# Patient Record
Sex: MALE | Race: WHITE
[De-identification: names, ages, dates, MRNs, and addresses within clinical notes are randomized per-mention and may not be internally consistent; named-entity substitution may affect disease eponyms.]

---

## 2017-01-23 NOTE — PDGENHP
History and Physical





- Chief Complaint


Weakness and fatigue with shortness of breath





- History of Present Illness


Patient is an 81 y/o male, well known to [facility] (Dr. ANA MARIA Rushing), with 

history of CAD s/p CABG (3V, 2012), PCI (LIMA graft, 2014), HTN, HLP, aortic 

stenosis (last mean aortic gradient with [facility] was 32 mm Hg), atrial 

fibrillation ([id number] score of 4), CHF (last [place] Echo with EF at 50%), 

CRI, prostate cancer with mets to the bone, and numerous pulmonary emboli (

history of Factor V Leiden, on coumadin with supratherapeutic INR this evening)

, who presented initially to [facility] earlier today with 

complaints of weakness, fatigue, and dyspnea.  After five days in [place], 

continued symptoms of weakness and fatigue ultimately resulted in the patient 

being evaluated in the ER.  Difficulty with standing was being noted.  No 

complaints of chest pains or pressure.  No PND or orthopnea (difficult to tell 

with the degree of dyspnea that the patient has noted).  No fevers or chills.  

No complaints of lower extremity swelling.





After 4.5 hours in the ER, awaiting for an ICU bed, alarms went off, and the 

patient was noted to be both unresponsive and without pulses.  CPR was 

implemented, both epinephrine and atropine were dosed, and attempts at 

intubation were undertaken, and ultimately unsuccessful.  A second dose of 

epinephrine was dosed, and the patient had spontaneous gag and respirations 

noted.  Normal sinus rhythm was noted, as was sinus bradycardia, and cardiology 

was consulted to the ER.  The patient was then taken to the cardiac cath lab 

for a temporary transvenous pacer placement.  Echocardiogram was also performed 

with normal LV size, mild reduction in LV function (42%), moderate LVH, mild to 

moderate mitral regurg, mild aortic insufficiency, and moderate to severe 

aortic stenosis (mean gradient was 25 mm Hg with peak to 60 mm Hg and MERI 

estimated to be 1.3 cm^2).  





At present, the patient is resting comfortably with intermittent ventricular 

pacing noted.  Salvos of nausea with emesis were also noted when I was 

assessing the patient this evening.





History Information





- Allergies/Home Medication List


Allergies/Adverse Reactions: 








daptomycin Allergy (Verified 11/15/13 09:46)


 





Home Medications: 








Aspirin [Aspirin 81mg (*)] 81 mg PO DAILY 03/13/12 [Last Taken 02/26/14]


Tadalafil [Cialis] 5 mg PO DAILY PRN 03/13/12 [Last Taken Unknown]


Tamsulosin HCl [Flomax 0.4 MG (*)] 0.4 mg PO DAILY 03/13/12 [Last Taken 02/27/14

]


Temazepam 15 mg PO HS PRN 03/13/12 [Last Taken 02/26/14]


celeCOXIB [Celebrex (*)] 200 mg PO DAILY 03/13/12 [Last Taken 02/24/14]


Bicalutamide [Casodex] 50 mg PO DAILY 12/30/13 [Last Taken 02/24/14]


Levothyroxine [Synthroid 150 mcg (RX)] 150 mcg PO DAILY06 12/30/13 [Last Taken 

02/27/14]


Lupron 1 desmond Q90D 12/30/13 [Last Taken 02/20/14]


Warfarin Sodium 2.5 mg PO TUTH@16 12/30/13 [Last Taken 02/23/14]


Warfarin Sodium 5 mg PO SUMOWEFRSA@16 12/30/13 [Last Taken 02/24/14]


Rosuvastatin Calcium [Crestor 5mg] 5 mg PO DAILY 02/03/14 [Last Taken 02/27/14]





I have personally reviewed and updated: family history, medical history, social 

history, surgical history





- Past Medical History


atrial fibrillation, coronary artery disease, cancer, DVT, GI bleed, 

hypertension, hyperlipidemia, myocardial infarction, pulmonary embolism





- Surgical History


Reports: angioplasty, coronary bypass surgery, coronary stent





- Family History


Positive for: CAD





- Social History


Smoking Status: Never smoked


Alcohol Use: Occasionally


Drug Use: None





Review of Systems


ROS: 10pt was reviewed & negative except for what was stated in HPI & below


Constitutional: Reports: weakness


EENMT: Reports: no symptoms


Cardiac: Reports: no symptoms


Respiratory: Reports: shortness of breath


Gastrointestinal: Reports: vomitting, nausea


Genitourinary: Reports: no symptoms


Muscolosketal: Reports: no symptoms


Skin: Reports: no symptoms


Neurological: Reports: weakness


Hematologic/Lymphatic: Reports: easy bleeding


Immunologic/Allergy: Reports: no symptoms





Physical Exam














Temp Pulse Resp BP Pulse Ox


 


 37.0 C   88   29 H  103/63   98 


 


 01/23/17 21:00  01/23/17 21:00  01/23/17 21:00  01/23/17 21:00  01/23/17 21:00




















O2 (L/minute)                  6














Constitutional: not in pain, chronically ill appearing, uncomfortable


Cardiovascular: systolic murmur (consistent with history of AS), irregularly 

irregular, JVD, pulses symmetric bilaterally, bradycardia


Peripheral Pulses: 2+: femoral (R), femoral (L), dorsalis-pedis (R), dorsalis-

pedis (L)


Respiratory: no respiratory distress, reduced air movement, inspiratory crackles


Gastrointestinal: normoactive bowel sounds


Genitourinary: no bladder fullness


Skin: warm


Musculoskeletal: full muscle strength


Neurologic: AAOx3, weakness, CN II-XII Intact


Psychiatric: interacting appropriately, not anxious, not encephalopathic





Lab Data & Imaging Review


EKG Interpretation: Positive for: normal sinsus rhythm (with RBBB and 

ventricular trigeminy noted)





Assessment & Plan


Assessment: 





Patient is an 81 y/o male with history of CAD s/p CABG and PCI (to LIMA), with 

HTN, HLP, metastatic prostate cancer (to bone), renal insufficiency, PE and 

atrial fibrillation (on coumadin with supratherapeutic INR this evening), CHF 

with mild reduction in LVEF (likely ischaemic, but aortic valve pathology is 

also known), who presented to [facility] with weakness, fatigue

, and dyspnea from [facility] after asystolic event noted 4.5 hours after 

being in the ER.  CPR was implemented with epi and atropine, with return of 

spontaneous pulses and respiration.  Given the arrhythmias noted, a temp 

transvenous pacer was placed.  Echocardiogram with similar findings to that 

which was noted in the past - specifically LVEF and degree of aortic valve 

pathology. Uncertain if the inferolateral wall motion abnormality was "new" or 

"old".  Minor elevation in troponin was noted (0.15) given the CPR performed.  





At present, the patient is doing well.  While en route from [place], pressures 

were noted to be somewhat soft, and Dopamine was started (and continued).  

Intermittent salvos of pacing and sinus rhythm are being noted.


Plan: 


Patient should have angiogram given the circumstances noted and significant 

past history.  Of concern is the elevation in INR (>3) and the elevation to the 

creatinine (1.5).  At present, the patient is without active cardiovascular 

complaints.  Would maintain therapy on Dopamine for pressure support. Would 

resume outpatient therapies for HTN management with improvement in blood 

pressure (this is not noted at present).  Would hold coumadin therapy given the 

supratherapeutic INR.  NPO after midnight for possible radial angiogram in the 

morning.

## 2017-01-23 NOTE — CPEKG
Heart Rate: 78

RR Interval: 769

P-R Interval: 164

QRSD Interval: 162

QT Interval: 332

QTC Interval: 379

P Axis: -40

QRS Axis: -70

T Wave Axis: 128

EKG Severity - ABNORMAL ECG -

EKG Impression: SINUS TACHYCARDIA WITH FREQUENT PVCS AND OCCASIONAL VENTRICULAR PACING

EKG Impression: VENTRICULAR TRIGEMINY

EKG Impression: RIGHT BUNDLE BRANCH BLOCK

Electronically Signed By: Jacky Marc 25-Jan-2017 08:18:35

## 2017-01-24 NOTE — SOAPPROG
SOAP Progress Note


Assessment/Plan: 


1. Asystolic arrest - Pt presented with asystolic arrest in [place] ED.  He was 

treated with ACLS protocol and temporary pacemaker with return of a perfusing 

rhythm.


--> Await records from [place].





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  He reports 

symptoms of fatigue and bradycardia on pulse oximeter monitoring prior to ED 

evaluation.  Suspect his symptoms and event were secondary to a 

bradyarrhythmia.  He is s/p temporary pacemaker placement and will need 

permanent pacemaker placement.  Complicating factors at this time include 

possible pneumonia and elevated INR.


--> Continue temporary pacemaker


--> Consult EP service for pacemaker placement





3. CAD - Pt has known CAD and is s/p CABG x 3 in 1/12 and PCI of his [place] to 

LAD graft in 2/14.  Pt denies symptoms of chest pain.  His EKG is unchanged.  

His troponin is mildly elevated.  He would likely benefit from angiogram prior 

to pacemaker placement to exclude obstructive CAD.


--> Continue medical management.


--> Angiogram when INR under 2.0 unless emergent.





4. Aortic stenosis - Pt has a history of moderate to severe aortic stenosis.  

His recent asystolic arrest is most suggestive of arrhythmia as opposed to 

severe aortic stenosis.


--> Echocardiogram





5. PAF - Pt has a history of PAF.  He is managed with a rate control and 

anticoagulation strategy.


--> Continue current strategy


--> Start heparin with INR < 2.





6. Hypercoagulable state - Pt has a history of a hyper coagulable state and 

pulmonary emboli.


--> Will give vitamin K 5 mg given INR of 4 and need for pacemaker and 

angiogram.





7. RUL infiltrate - Pt has a RUL infiltrate suggestive of pneumonia.  ? 

community aquired vs asperation.  Will obtain an ID consult given need for 

pacemaker placement.











Subjective: 


No chest pain


lying flat in bed


Pt reports fatigue and lethargy prompting ED evaluation


No orthopnea or PND








Objective: 





 Vital Signs











Temp Pulse Resp BP Pulse Ox


 


 37.1 C   79   24 H  114/72   95 


 


 01/24/17 07:59  01/24/17 16:00  01/24/17 16:00  01/24/17 16:00  01/24/17 16:00








 Laboratory Results





 01/24/17 05:40 





 01/24/17 05:40 





 











 01/23/17 01/24/17 01/25/17





 05:59 05:59 05:59


 


Intake Total  627 


 


Output Total  450 670


 


Balance  177 -670








 











PT  40.4 SEC (12.0-15.0)  H  01/24/17  05:40    


 


INR  4.09  (0.83-1.16)  H  01/24/17  05:40    














Physical Exam





- Physical Exam


General Appearance: alert, no apparent distress


Respiratory: other (decreased breath sounds L base)


Cardiac/Chest: regular rate, rhythm, systolic murmur


Abdomen: normal bowel sounds, non-tender, soft


Extremities: No pedal edema


Neuro/Psych: alert, oriented x 3





ICD10 Worksheet


Patient Problems: 


 Problems











Problem Status Diagnosed


 


Coronary artery bypass grafting Active 


 


Hematuria syndrome Active 


 


Nausea and vomiting Active 


 


Non-healing surgical wound Active 


 


Acute combined systolic and diastolic CHF, NYHA class 3 Acute 


 


Chronic Disease Management/Transitional Care Program Acute 


 


Coronary arteriosclerosis Acute 


 


Gastrointestinal bleeding, lower Acute 


 


S/P CABG x 3 Acute

## 2017-01-24 NOTE — PDCARCONS
Cardiology Consult


Reason for Consult: EP CONSULT.  Asystolic arrest


Chief Complaint: Fatigue, syncope


Requesting Physician: Dr. Gamal Garrett, Dr. Federico Rushing


History of Present Illness: 


82-year-old male, followed by Dr. Federico Rushing in our practice.





This weekend he was in [place], on Friday started feeling very fatigued and has 

shortness of breath.  He checked his pulse with a pulse oximeter and noted that 

his heart rate was 30 beats per minute but he decided not to go to the 

emergency department despite having similar symptoms on Saturday also.  Monday 

he went to the emergency department at his wife's insistence and was noted to 

be bradycardic and subsequently proceeded to have asystolic cardiac arrest that 

required CPR, atropine and epinephrine and placement of a temporary pacemaker 

wire.





I visited with the patient in the ICU, his nurse was present at the bedside.





  At this time he offers no complaints.





History Information





- Allergies/Home Medication List


Allergies/Adverse Reactions: 








daptomycin Allergy (Verified 11/15/13 09:46)


 





Home Medications: 








Tamsulosin HCl [Flomax 0.4 MG (*)] 0.4 mg PO DAILY 03/13/12 [Last Taken 01/23/17

]


celeCOXIB [Celebrex (*)] 100 mg PO DAILY 03/13/12 [Last Taken 01/23/17]


Bicalutamide [Casodex] 50 mg PO DAILY 12/30/13 [Last Taken 01/23/17]


Levothyroxine [Synthroid 150 mcg (RX)] 150 mcg PO DAILY06 12/30/13 [Last Taken 

01/23/17]


Acyclovir [Zovirax 400 mg (*)] 400 mg PO BID PRN 01/24/17 [Last Taken Unknown]


Carvedilol [Coreg (*)] 6.25 mg PO BIDMEAL 01/24/17 [Last Taken 01/23/17 18:00]


Clopidogrel Bisulfate [Plavix (*)] 75 mg PO DAILY 01/24/17 [Last Taken 01/23/17]


Dicyclomine [Bentyl 10 MG (*)] 10 mg PO DAILY 01/24/17 [Last Taken 01/23/17]


Digoxin [Lanoxin 250 mcg (RX)] 125 mcg PO DAILY10 01/24/17 [Last Taken 01/23/17]


Fluticasone Nasal [Flonase Nasal Spray (RX)] 1 sprays EACHNARE DAILY 01/24/17 [

Last Taken Unknown]


Herbals/Supplements -Info Only 1 ea PO DAILY 01/24/17 [Last Taken Unknown]


Nitroglycerin [Nitrostat 0.4 mg (*)] 0.4 mg SL Q5M PRN 01/24/17 [Last Taken 

Unknown]


Pantoprazole Sodium [Protonix 40mg (*)] 40 mg PO DAILY 01/24/17 [Last Taken 01/ 23/17]


Rosuvastatin Calcium [Crestor 5mg] 5 mg PO DAILY 01/24/17 [Last Taken 01/23/17]


Spironolactone [Aldactone 25 MG (*)] 25 mg PO DAILY 01/24/17 [Last Taken 01/23/ 17]


Temazepam [Restoril 15 MG (*)] 15 mg PO HSPRN PRN 01/24/17 [Last Taken Unknown]


Warfarin Sodium [Coumadin 2.5MG (*)] 2.5 mg PO SUTUTHSA 01/24/17 [Last Taken 01/ 22/17]


Warfarin Sodium [Coumadin 5MG (*)] 5 mg PO MWF 01/24/17 [Last Taken 01/23/17]





I have personally reviewed and updated: medical history





- Past Medical History


Additional medical history: Coronary artery disease status post CABG.  Lungs 

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 

years ago.  Factor 5 Leiden mutation with 3 pulmonary emboli in the past





- Social History


Smoking Status: Never smoked


Alcohol Use: Occasionally


Drug Use: None





Physical Exam














Temp Pulse Resp BP Pulse Ox


 


 37.1 C   79   24 H  114/72   95 


 


 01/24/17 07:59  01/24/17 16:00  01/24/17 16:00  01/24/17 16:00  01/24/17 16:00




















O2 (L/minute)                  5














Constitutional: no apparent distress, appears nourished


Eyes: PERRL, EOMI


Ears, Nose, Mouth, Throat: hearing normal


Cardiovascular: regular rate and rhythym, systolic murmur


Respiratory: no respiratory distress


Gastrointestinal: soft, non-tender abdomen


Neurologic: AAOx3


Psychiatric: interacting appropriately, not anxious, thought process linear





Lab and Imaging





 01/24/17 05:40





 01/24/17 05:40














WBC  11.69 10^3/uL (3.80-9.50)  H  01/24/17  05:40    


 


RBC  4.24 10^6/uL (4.40-6.38)  L  01/24/17  05:40    


 


Hgb  13.6 g/dL (13.7-17.5)  L  01/24/17  05:40    


 


Hct  40.7 % (40.0-51.0)   01/24/17  05:40    


 


MCV  96.0 fL (81.5-99.8)   01/24/17  05:40    


 


MCH  32.1 pg (27.9-34.1)   01/24/17  05:40    


 


MCHC  33.4 g/dL (32.4-36.7)   01/24/17  05:40    


 


RDW  13.4 % (11.5-15.2)   01/24/17  05:40    


 


Plt Count  123 10^3/uL (150-400)  L  01/24/17  05:40    


 


MPV  11.3 fL (8.7-11.7)   01/23/17  22:25    


 


Neut % (Auto)  Not Reported   01/23/17  22:25    


 


Lymph % (Auto)  Not Reported   01/23/17  22:25    


 


Mono % (Auto)  Not Reported   01/23/17  22:25    


 


Eos % (Auto)  Not Reported   01/23/17  22:25    


 


Baso % (Auto)  Not Reported   01/23/17  22:25    


 


Nucleat RBC Rel Count  0.0 % (0.0-0.2)   01/23/17  22:25    


 


Absolute Neuts (auto)  Not Reported   01/23/17  22:25    


 


Absolute Lymphs (auto)  Not Reported   01/23/17  22:25    


 


Absolute Monos (auto)  Not Reported   01/23/17  22:25    


 


Absolute Eos (auto)  Not Reported   01/23/17  22:25    


 


Absolute Basos (auto)  Not Reported   01/23/17  22:25    


 


Absolute Nucleated RBC  0.00 10^3/uL (0-0.01)   01/23/17  22:25    


 


Immature Gran %  Not Reported   01/23/17  22:25    


 


Seg Neutrophils %  74 %  01/23/17  22:25    


 


Band Neutrophils %  8 %  01/23/17  22:25    


 


Lymphocytes %  1 %  01/23/17  22:25    


 


Monocytes %  17 %  01/23/17  22:25    


 


Immature Gran #  Not Reported   01/23/17  22:25    


 


Absolute Seg Neuts  8.44 10^/uL (1.70-6.50)  H  01/23/17  22:25    


 


Absolute Band Neuts  0.91 10^3/uL (0.00-0.70)  H  01/23/17  22:25    


 


Absolute Lymphocytes  0.11 10^3/uL (1.00-3.00)  L  01/23/17  22:25    


 


Absolute Monocytes  1.94 10^3/uL (0.30-0.80)  H  01/23/17  22:25    


 


Platelet Estimate  DECREASED  (ADEQ)  L  01/23/17  22:25    


 


Smear Review By  TIAN KEMP MD   01/23/17  22:25    


 


PT  40.4 SEC (12.0-15.0)  H  01/24/17  05:40    


 


INR  4.09  (0.83-1.16)  H  01/24/17  05:40    


 


APTT  44.2 SEC (23.0-38.0)  H  01/24/17  05:40    


 


Sodium  140 mEq/L (134-144)   01/24/17  05:40    


 


Potassium  4.8 mEq/L (3.5-5.2)   01/24/17  05:40    


 


Chloride  104 mEq/L ()   01/24/17  05:40    


 


Carbon Dioxide  23 mEq/l (22-31)   01/24/17  05:40    


 


Anion Gap  13 mEq/L (8-16)   01/24/17  05:40    


 


BUN  33 mg/dL (7-23)  H  01/24/17  05:40    


 


Creatinine  1.4 mg/dL (0.7-1.3)  H  01/24/17  05:40    


 


Estimated GFR  49   01/24/17  05:40    


 


Glucose  126 mg/dL ()  H  01/24/17  05:40    


 


Calcium  8.5 mg/dL (8.5-10.4)   01/24/17  05:40    


 


Total Bilirubin  1.5 mg/dL (0.1-1.4)  H  01/23/17  22:25    


 


AST  77 IU/L (17-59)  H  01/23/17  22:25    


 


ALT  61 IU/L (21-72)   01/23/17  22:25    


 


Alkaline Phosphatase  84 IU/L ()   01/23/17  22:25    


 


Creatine Kinase  43 IU/L (0-224)   01/23/17  22:25    


 


CK-MB (CK-2) Fraction  2.39 ng/mL (0-3.19)   01/23/17  22:25    


 


Troponin I  0.561 ng/mL (0-0.034)  H  01/24/17  05:40    


 


Total Protein  6.0 g/dL (6.3-8.2)  L  01/23/17  22:25    


 


Albumin  3.2 g/dL (3.5-5.0)  L  01/23/17  22:25    








Visualized and Interpreted EKG results: Yes


EKG additional interpertation: Normal sinus rhythm, intermittent first-degree 

heart block, bifascicular block


Echocardiogram: 


Reviewed results, interpreted by Dr. Holloway-left ventricular ejection fraction of 

35-40%, moderate to severe aortic stenosis





A/P


Assessment: 


1. Ischemic cardiomyopathy status post CABG


2. Moderate to severe aortic stenosis


3. Temporary pacemaker wire in place from right femoral approach, recent 

asystolic cardiac arrest


4. Left lung lobectomy and radiation


5. Factor 5 Leiden mutation, prior history of pulmonary emboli


6. Renal insufficiency


7. Right upper lobe pneumonia


Plan: 


82-year-old male with the above-noted diagnoses.





He has bifascicular and intermittent  trifascicular heart block.  He has had 

asystolic cardiac arrest.  He meets class 1 indication for permanent pacing.  

Given that he has advanced AV erickson conduction disease, we will leave the 

temporary pacemaker in until permanent pacemaker can be placed.  We should 

place a biventricular pacemaker if possible given advanced AV erickson conduction 

disease  and left ventricular ejection fraction of 35-40%.  ? consideration for 

EPS and place BiVICD if inducible for VT. 





Pacemaker procedure will be delayed until his INR levels are around 2.0.  

Moreover his pneumonia needs to be treated and he needs to have coronary 

angiography to rule out critical coronary artery disease.  Warfarin should be 

continued if possible during pacemaker implantation and planned coronary 

angiography if the  for the procedure is comfortable with that given 

the patient's history of factor 5 Leiden mutation and pulmonary emboli.  Risks 

of pacemaker implantation including death, mi, CVA, cardiac tamponade, deep 

venous thrombosis, pulmonary embolism, lead dislodgement were discussed with 

the patient.





Permanent  pacemaker procedure will likely be delayed until Thursday or later.  

I have discussed his case with my partner Dr. Ramirez aNthan who will be seeing 

the patient tomorrow and subsequently.  I have also discussed his case with his 

cardiologist Dr. Federico Rushing.

## 2017-01-24 NOTE — GCON
[f rep st]



                                                                    CONSULTATION





CRITICAL CARE CONSULT.



DATE OF CONSULTATION:  01/24/2017



HISTORY OF PRESENT ILLNESS:  The patient is an 82-year-old male with a complicated medical history, i
ncluding coronary artery disease, bundle branch blocks, and at least moderate aortic stenosis and atr
ial fibrillation.  He travels to [place] with some frequency and was advised against that activity.  He 
went recently and monitored his oxygen saturation while there and reports the lowest oxygen saturatio
n of 89%.  He did increase his oxygen flow rate while he was there.  However, he developed increasing
 fatigue and noticed irregular heartbeats and continued to feel fatigue after several days.  He went 
to the emergency department, where he was undergoing a workup and apparently had asystolic arrest.  T
minerva I cannot substantiate this, I am told that he had a second-degree heart block at the time.  He 
is reported to have 9 minutes of CPR and several doses of epinephrine,and an attempt at intubation wa
s unsuccessful.  However, they were able to resuscitate him.  He was taken to the cath lab in [facility]
here a temporary pacemaker was placed by Dr. Alvarenga, and he was transferred to [place] last night.
  Since that time, he has been relatively stable on a dopamine drip and has been seen by Cardiology, 
and there are plans for an eventual permanent pacemaker. 



In terms of symptoms, he said he was really feeling quite fine before going to [place].  His daughter di
d have a recent cold but felt that that had resolved.  She did have a cough for several weeks.  He ha
d no fevers, chills, or sweats.  He did not himself have any cough or sputum production or generalize
d malaise, sweats or other signs of infection leading up to this event.



REVIEW OF SYSTEMS:  Otherwise negative.



PAST MEDICAL HISTORY:  Includes:

1.  Remote myocardial infarction with coronary artery disease, who has undergone coronary bypass licha
ting in the past.

2.  Hypertension.

3.  Hyperlipidemia.

4.  Atrial fibrillation.

5.  At least moderate aortic stenosis that has been relatively stable in discussion with Dr. Tami nash today.

6.  Pulmonary embolism.  He has had 3 times in the past and is known to have a factor V Leiden defici
ency and remains on chronic Coumadin.  His INR was elevated at the time of transfer.  

7.  He also has known prostate cancer with bone metastasis.  It is unclear to me what the extent of d
isease is.

8.  He has had GI bleeding in the remote past.

9.  He had a pulmonary sarcoma, requiring a lobectomy.  He had a repeat lobectomy.  This was all on t
he left followed by radiation therapy but has been disease-free for many years since then.



PAST SURGICAL HISTORY:  Includes the lobectomy as described above, coronary artery bypass grafting an
d stenting in the past.



SOCIAL HISTORY:  He is a lifelong nonsmoker.  Has occasional alcohol but no alcohol-related illnesses
 and no recreational drugs.



FAMILY HISTORY:  Includes coronary artery disease but no sudden death.



MEDICATIONS:  Currently include Tylenol, Zofran, dopamine drip.



PHYSICAL EXAMINATION:  VITAL SIGNS:  He was afebrile at 37.1, oxygen saturation 95% on 5 L, respirati
ons 20, heart rate 78, blood pressure 93/60 on a dopamine drop.  GENERAL:  He was awake and alert, in
 no apparent distress, and able to speak in full sentences without using accessory muscles for breath
ing.  HEENT:  Pupils are equally round reactive to light, nonicteric, noninjected.  Mucous membranes 
are moist without erythema or exudate.  NECK:  Supple without adenopathy or jugular vein distention. 
 LUNGS:  Breath sounds were clear to auscultation bilaterally without wheezes, rubs, or rales.  HEART
:  Regular rate and rhythm without obvious rub, but did have a prominent, probably 3/6, systolic murm
ur heard best at the left upper sternal border.  ABDOMEN:  Soft, nontender, and nondistended without 
hepatosplenomegaly and normoactive bowel tones.  EXTREMITIES:  No clubbing, cyanosis, or edema.  SKIN
:  His right groin where his temporary pacer has been inserted appeared to be clean and dry and witho
ut evidence of hematoma.  NEUROLOGIC:  Nonfocal, including cranial nerves and deep tendon reflexes.



LABORATORY FINDINGS:  Objective data includes white count is 11.69 with an hematocrit of 40, platelet
s of 123.  INR 4.09.  Sodium is 140, potassium 4.8, chloride 104, bicarb 23, BUN 33, creatinine 1.4, 
glucose 126, calcium 8.5.  Troponin went from 0.38 to 0.56.  



Chest x-ray shows a right upper lobe scattered infiltrate.



ASSESSMENT AND PLAN:  

1.  Cardiac arrest, which was likely due to an underlying arrhythmia.  There are many details about t
his arrest that are atypical, such as 9 minutes' worth of CPR and his traumatic neurologic recovery, 
which appears to be complete at this time.  In any case, he will eventually get a cardiac catheteriza
tion to evaluate his coronary arteries as well as his aortic stenosis and placement of a likely perma
nent pacemaker.  He remains on dopamine now.  He is having some PVCs with that.  As his blood pressur
e improves, he may be able to have less.  His pacemaker is set on demand mode, and so that could pote
ntially cover him on a lower dose of dopamine moving forward.

2.  Right upper lobe infiltrate.  It is possible that this represents pneumonia.  He does have a mild
ly elevated white blood cell count.  A serum procalcitonin would be useful, but that lab test is not 
available in this hospital.  In any case, I think giving him some Zosyn to cover aspiration pneumonia
 is reasonable at this time and follow his white count and monitor for any specific changes.  

3.  Elevated INR.  This is related to his Coumadin; and because of his hypercoagulable state, we will
 let that drift and it eventually should normalize until he is safe for further intervention without 
bleeding, so no vitamin K or FFP at this time.

4.  Hypertension.  This is well controlled at this time.  He is on dopamine to assist with that and c
ertainly does not need any of his oral medications.

5.  What looks to be acute on chronic kidney disease with a bump in his creatinine from 1.2 to 1.4.  
We should maintain his blood pressure and give him adequate fluids and watch his urine output over th
e next 24 hours.  No other intervention is required at this time. 



A total of 45 minutes for critical care time was required for this patient.





Job #:  [id number]

## 2017-01-24 NOTE — DX
Portable Chest   6:02 AM



History: Asystole, possible aspiration



Comparison: February 23, 2016



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 Multip
le surgical clips are again present associated with this elevated diaphragmatic margin. There is new 
right upper lobe infiltrate consistent with pneumonia. Median sternotomy wires and CABG clips are pre
sent. A trans inferior vena cava pacer lead is present with tip overlying the right ventricular apex 
area. It is difficult to  heart size. The pulmonary vascularity is plethoric but this may be a s
equelae of portable technique. There is a small right pleural effusion. There are thin prior right la
teral rib fractures. Is difficult to exclude an acute fracture. Severe chronic bilateral shoulder art
hritis is stable and may be consistent with end stage rheumatoid disease.



Impression: Consistent with right upper lobe aspiration pneumonia.

## 2017-01-24 NOTE — ECHO
[id number]

[id number]



+---------------------------------------------------+      [address]

:                                                   :       [place] [address]

:                                                   :           [phone]

+---------------------------------------------------+       Fax [phone]



                       Adult Echocardiographic Report



+----------------------------------------------------------------------------

---------+

:Name: FAINA EDWARDS Date: 2017 09:58 AM                    

         :

:MRN: [id number]          Hospital Admission Number: [id number]

tion: 250:

:: 1934          Gender: Male                           Height: 68 i

n        :

:Age: 82 yrs              Race: WH                               Weight: 160 

lb       :

:Reason For Study: Eval Valves and LV FX                                     

         :

:                                                                BSA: 1.9 met

ers2     :

:History: Post CPR                                                           

         :

+----------------------------------------------------------------------------

---------+

MMode/2D Measurements & Calculations

IVSd: 1.2 cm        RVDd: 4.0 cm     FS: 19.7 %          MV Diam: 3.1 cm

LVPWd: 1.2 cm       LVIDd: 4.6 cm    EDV(Teich): 95.6 ml

                    LVIDs: 3.7 cm    ESV(Teich): 56.9 ml

                                     EF(Teich): 40.5 %

        _____________________________________________________________



Ao root diam:       LVOT diam: 2.2 cmLVLd ap4: 7.4 cm    SV(MOD-sp4):

4.3 cm              LVOT area:       EDV(MOD-sp4):       32.0 ml

ACS: 0.43 cm        3.8 cm2          68.0 ml

LA dimension:                        LVLs ap4: 6.3 cm

4.6 cm                               ESV(MOD-sp4):

                                     36.0 ml

                                     EF(MOD-sp4): 47.1 %



Normal Measurement Values:

+----------------------------------------------------------------------------

----------------------------------+

:LVIDd (3.5-5.7cm)    IVSd (0.6-1.1cm)     LVPWd (0.6-1.1cm)     Aortic Root 

(2.0-3.7cm)Left Atrium (1.5-4.0cm):

:LV Vol(d) (76-115ml) LV Vol(s) (29-48ml)  Ejec Fraction (50-65%)PV Paxton (0.6-

1.2m/s)    TV Paxton (0.4-1.0m/s)    :

:MV E Paxton (0.8-1.0m/s)MV A Paxton (0.3-1.0m/s)LVOT Paxton (0.7-1.2m/s) Asc Ao Paxton (

0.9-1.8m/s)                       :

+----------------------------------------------------------------------------

----------------------------------+

Doppler Measurements & Calculations

MV E max paxton:     MV V2 max:          MV P1/2t max paxton:    Ao V2 max:

100.2 cm/sec      123.8 cm/sec        109.9 cm/sec         343.4 cm/sec

MV A max paxton:     MV max P.1 mmHg MV P1/2t: 74.6 msec  Ao max P.9 cm/sec       MV V2 mean:         MVA(P1/2t): 2.9 cm2  47.2 mmHg

MV E/A: 1.2       75.7 cm/sec         MV dec slope:        Ao mean PG:

                  MV mean P.6 mmHg

                  2.6 mmHg            431.5 cm/sec2        Ao V2 mean:

                  MV V2 VTI: 39.1 cm                       257.1 cm/sec

                  MV area (1 diam):                        Ao V2 VTI:

                  7.5 cm2                                  74.3 cm

                                                           MERI(I,D):

                  MVA(VTI): 1.2 cm2                        0.65 cm2

                  MV Flow area

                  (1diam): 7.5 cm2

        _____________________________________________________________



AI max paxton:       LV V1 mean PG:      MR max paxton:          MR(RF 1 diam):

382.8 cm/sec      0.77 mmHg           435.7 cm/sec         27.1 %

AI max PG:        LV V1 mean:         MR max P.9 mmHg

58.6 mmHg         40.2 cm/sec

                  LV V1 VTI: 12.7 cm

        _____________________________________________________________



SV(MV 1 diam):    PA V2 max:          PI end-d paxton:        TR max paxton:

295.3 ml          71.9 cm/sec         160.2 cm/sec         283.7 cm/sec

SI(MV 1 diam):    PA max P.1 mmHg                      TR max P.8 ml/m2                                                32.2 mmHg

SV(LVOT): 48.4 ml                                          RAP systole:

                                                           5.0 mmHg

                                                           RVSP(TR):

                                                           37.2 mmHg

        _____________________________________________________________



RF(MV,Ao)(1 diam):

-2.6

RF(MV,LVOT)

(1diam): 0.84



Left Ventricle

The left ventricle is normal in size. There is mild concentric left

ventricular hypertrophy. Ejection Fraction = 35-40%. There is Doppler

evidence for diastolic dysfunction. Doppler evidence of elevated LV filling

pressures. There is moderate global hypokinesis of the left ventricle.

Flattened septum is consistent with RV pressure/volume overload.





Right Ventricle

The right ventricle is moderately dilated. The right ventricular systolic

function is mildly reduced.



Atria

The left atrium is mildly dilated. The right atrium is borderline dilated.



Mitral Valve

The mitral valve leaflets appear thickened, but open well. There is no

evidence of mitral valve prolapse. There is no mitral valve stenosis. There

is moderate mitral regurgitation.





Tricuspid Valve

There is moderate tricuspid regurgitation. Right ventricular systolic

pressure is 37mmHg.



Aortic Valve

There is severe aortic valve calcification. The Ao mean PG is 29 mmHg with a

Ao V2 max of 3.4 m/sec and a Ao max PG of 47 mmHg. Planimetered valve area

is only 0.4 cm2. Moderate to severe valvular aortic stenosis. Moderate to

severe aortic regurgitation.



Pulmonic Valve

The pulmonic valve is normal in structure and function. Mild pulmonic

valvular regurgitation.



Great Vessels

The aortic root is normal size.



Pericardium/Pleural

There is no pericardial effusion.





Conclusion

A complete two-dimensional transthoracic echocardiogram was performed (2D,

M-mode, Doppler and color flow Doppler).

There is mild concentric left ventricular hypertrophy.

Ejection Fraction = 35-40%.

There is Doppler evidence for diastolic dysfunction.

Doppler evidence of elevated LV filling pressures.

There is moderate global hypokinesis of the left ventricle.

The left atrium is mildly dilated.

The right atrium is borderline dilated.

The mitral valve leaflets appear thickened, but open well.

There is no mitral valve stenosis.

There is moderate mitral regurgitation.

There is moderate tricuspid regurgitation.

Right ventricular systolic pressure is 37mmHg.

There is severe aortic valve calcification.

Moderate to severe valvular aortic stenosis.

Moderate to severe aortic regurgitation.

Mild pulmonic valvular regurgitation.

There is no pericardial effusion.

The right ventricle is moderately dilated.

The Ao mean PG is 29 mmHg with a Ao V2 max of 3.4 m/sec and a Ao max PG of

47 mmHg. Planimetered valve area is only 0.4 cm2



EF may be slightly better than 2014



_____________________________________________________________________________







Final Reading Physician:

                        Dr Brook Holloway  electronically signed on 2017

                        04:59 PM

Ordering Physician: EDGARD CARRILLO

Performed By: Sancho Garcia, SREEKANTHCS

## 2017-01-25 NOTE — CPEKG
Heart Rate: 81

RR Interval: 741

P-R Interval: 188

QRSD Interval: 168

QT Interval: 464

QTC Interval: 539

P Axis: -16

QRS Axis: -66

T Wave Axis: -33

EKG Severity - ABNORMAL ECG -

EKG Impression: SINUS RHYTHM

EKG Impression: RBBB AND LAFB

Electronically Signed By: Jake Vasquez 26-Jan-2017 00:41:18

## 2017-01-25 NOTE — SOAPPROG
SOAP Progress Note


Assessment/Plan: 


1. Asystolic arrest - Pt presented with asystolic arrest in [place] ED.  He was 

treated with ACLS protocol and temporary pacemaker with return of a perfusing 

rhythm.


--> Await records from [facility].





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  He reports 

symptoms of fatigue and bradycardia on pulse oximeter monitoring prior to ED 

evaluation.  Suspect his symptoms and event were secondary to a 

bradyarrhythmia.  He is s/p temporary pacemaker placement and will need 

permanent pacemaker placement.  Complicating factors at this time include 

possible pneumonia and elevated INR.


--> Continue temporary pacemaker


--> anticipate biventricular pacemaker placement on thursday after evaluated by 

ID.





3. CAD - Pt has known CAD and is s/p CABG x 3 in 1/12 and PCI of his [place] to 

LAD graft in 2/14.  Pt denies symptoms of chest pain.  His EKG is unchanged.  

His troponin is mildly elevated.  He would likely benefit from angiogram prior 

to pacemaker placement to exclude obstructive CAD.


--> Continue medical management with asa and crestor.  No BB or ace secondary 

to low BP.


--> Angiogram when INR under 2.0 unless emergent.





4. Aortic stenosis - Pt has a history of moderate to severe aortic stenosis.  

His recent asystolic arrest is most suggestive of arrhythmia as opposed to 

severe aortic stenosis.


--> consider evaluation for TAVR when acute issues resolve.





5. PAF - Pt has a history of PAF.  He is managed with a rate control and 

anticoagulation strategy.


--> Continue current strategy


--> Start heparin with INR < 2.





6. Hypercoagulable state - Pt has a history of a hyper coagulable state and 

pulmonary emboli.  INR down to 2.76 today.


--> INR at 15:00.  start heparin when less than 2.





7. RUL infiltrate - Pt has a RUL infiltrate suggestive of pneumonia.  ? 

community aquired vs aspiration.  WBC mildly elevated with left shift.  Pt is 

on appropriate antibiotics and blood cxs are pending.


--> Appreciate pulmonary critical care input


--> Id evaluation today given need for pacemaker placement.





Subjective: 


No chest pain


No orthopnea or PND


+ dyspnea


+ cough


limited mobility secondary to temporary pacemaker placement


Objective: 





 Vital Signs











Temp Pulse Resp BP Pulse Ox


 


 36.8 C   77   25 H  98/45 L  96 


 


 01/25/17 10:00  01/25/17 10:00  01/25/17 10:00  01/25/17 10:00  01/25/17 10:00








 Laboratory Results





 01/25/17 04:40 





 01/25/17 04:40 





 











 01/24/17 01/25/17 01/26/17





 05:59 05:59 05:59


 


Intake Total 627 2079 


 


Output Total 450 1555 


 


Balance 177 524 








 











PT  29.5 SEC (12.0-15.0)  H D 01/25/17  04:40    


 


INR  2.76  (0.83-1.16)  H  01/25/17  04:40    














Physical Exam





- Physical Exam


General Appearance: alert, no apparent distress


Respiratory: other (corse bronchial BS on R.  No egophany)


Cardiac/Chest: regular rate, rhythm, systolic murmur


Abdomen: normal bowel sounds, non-tender, soft


Extremities: No pedal edema


Neuro/Psych: alert





ICD10 Worksheet


Patient Problems: 


 Problems











Problem Status Diagnosed


 


Coronary artery bypass grafting Active 


 


Hematuria syndrome Active 


 


Nausea and vomiting Active 


 


Non-healing surgical wound Active 


 


Acute combined systolic and diastolic CHF, NYHA class 3 Acute 


 


Chronic Disease Management/Transitional Care Program Acute 


 


Coronary arteriosclerosis Acute 


 


Gastrointestinal bleeding, lower Acute 


 


S/P CABG x 3 Acute

## 2017-01-25 NOTE — GCON
[f 
rep st]



                                                                    CONSULTATION





INFECTIOUS DISEASE CONSULTATION



DATE OF CONSULTATION:  01/25/2017



REFERRING PHYSICIAN:  Jake Vasquez MD



REASON FOR CONSULTATION:  Clearance for pacemaker placement.



HPI:  An 82-year-old male with an extensive past history related to coronary 
artery disease, undergoing a CABG in 2012, whose current problems date back to 
January 22nd when he was towards the end of his recent trip up to [place].  He 
developed some malaise starting on January 20th which became progressive, and 
he presented to the emergency room on the 22nd in [place] for further evaluation.  
While patient was being evaluated in the emergency room, he was noticed to have 
weakness and after 4.5 hours in the emergency room, patient was noted to be 
unresponsive without pulses.  CPR was implemented and patient received 
epinephrine and atropine, and after a second dose of epinephrine patient had a 
spontaneous gag.  Intubation attempts were made, but unsuccessful.  Patient was 
emergently taken to the cath lab and a temporary transvenous pacemaker was 
placed.  Subsequently, patient was transferred to [facility] on 01/23/
2017 for further management.  On arrival, patient was noted to have a chest x-
ray with a right upper lobe infiltrate, which was personally reviewed by me, 
and was empirically started on Zosyn 3.375 IV q6h. 



Today, patient's primary complaints are related to nasal congestion and his 
cough.  His malaise has improved, but he notes that it is hard to tell because 
he has not been doing anything.  He also has some right upper chest pain with 
coughing.



PAST MEDICAL HISTORY:  

1.  Coronary artery disease, status post CABG.

2.  Atrial fibrillation, on Coumadin.

3.  Pulmonary embolus, on Coumadin.

4.  Renal insufficiency.

5.  Metastatic prostate cancer to bone.  

6.  Factor 5 Leiden deficiency.  

7.  Aortic stenosis.

8.  Rheumatoid arthritis.

9.  Congestive heart failure with an ejection fraction in the 40s.



PAST SURGICAL HISTORY:  CABG, hammertoe repair, hernia repair, hip replacement, 
and thoracotomy.



SOCIAL HISTORY:  Patient uses occasional alcohol.  He is .  No tobacco.



FAMILY HISTORY:  Positive for coronary artery disease, diabetes, and 
hypertension.



ALLERGIES:  Daptomycin, patient reports a pulmonary reaction.



MEDICATIONS:  Zosyn 3.375 IV q.8 started 01/24/2017.  He is also on Tylenol, 
aspirin, Casodex, Bentyl, dopamine, IV heparin, Zofran, Crestor, and Flomax.



REVIEW OF SYSTEMS:  A complete 10-point review of systems was performed and is 
negative, except as mentioned in HPI.  Pertinent negatives include no rash, no 
itching, no diarrhea.



PHYSICAL EXAM:  VITAL SIGNS:  Blood pressure 106/57, heart rate 76, respiratory 
rate 33, saturation 96% on 2 L.  patient's rhythm is paced.  Patient was 
examined and was unable to move in bed due to pacer placement, but HEENT:  No 
conjunctival hemorrhage. No jaundice.  Oropharynx:  Moist mucous membranes and 
excellent dentition.  NECK:  Supple.  No lymphadenopathy.  CARDIOVASCULAR:  
Regular rate with a blowing systolic murmur at the left upper sternal border.  
CHEST:  Scattered coarse breath sounds on the right.  No crackles.  Absent 
breath sounds in the left base due to surgical revision.  ABDOMEN:  Obese, soft
, nontender.  Bowel sounds are present.  EXTREMITIES:  He had trace pretibial 
edema.  No joint swelling.  SKIN:  Without rash.  NEUROLOGICALLY:  He is alert 
and oriented x4.  Fluent speech.  Moving all 4 extremities equally.



LABORATORY:  Admission white count 11.4 now 9.5, hematocrit 36, platelets 133, 
71% neutrophils, 3% bands, 2%  lymphocytes, 23% monocytes.



IMAGING:  Chest x-ray was personally reviewed by me, which showed possible 
infiltrate in the right upper lobe and obvious prior left lower lobe revision 
with existing hernia.



ASSESSMENT AND PLAN:  This is an 82-year-old male with multiple medical problems
,  including coronary artery disease, hypertension, metastatic prostate cancer, 
pulmonary embolus, and atrial fibrillation on Coumadin, and congestive heart 
failure with underlying aortic stenosis, who had an asystolic event at an 
outside facility and was resuscitated successfully.  Prior to this, patient had 
some malaise and respiratory symptoms.  Suspect this was a viral URI.  
Supporting this is a monocytosis on labs.  Nonetheless, patient did develop an 
infiltrate and resuscitation efforts put him at risk for aspiration pneumonia.  
Therefore, I think it is reasonable to have a short-course of therapy for 
pneumonia.  Nonetheless, I do not think that this precludes placement of a 
pacemaker after blood cultures from 01/24/2017 are negative at 2 days.  
Therefore, safe to place pacemaker on 01/27/2017.  Preceding pacemaker 
placement ,would give patient a bed bath daily and add perioperative vancomycin 
x1 g dose.  Recommend a total of 5 days of antibiotics for pneumonia, if 
patient improves, could complete course with Augmentin.  



Thank you for this consultation.  Let us know if you have additional questions.





Job #:  [id number]

MTDD

## 2017-01-25 NOTE — PDINTPN
Intensivist Progress Note


Assessment/Plan: 


Assessment/plan:


* Cardiac arrest - likely related to arrythmia (eg trifasicular block) with 

excellent neuro outcome. 


* Heart block- remains on dopamine drip with minimal ectopy and maintaining 

rhythm with occasional pacer capture. Permanent pacer soon


* PNA? i suspect aspiration since he lacked symptoms prior to his event. However

, cannot exclude bacterial CAP so started Zosyn (for anaerobes) 1/24. ID to see 

and will defer for ongoing Abx. Minimal O2 requirement. 


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 As per cardiology, 

INR is down to 2.07 this afternoon while holding coumadin. Starting heparin 

drip (without bolus) to maintain AC for now. 


* HTN- stable on DA


* ABDIAS?- creatinine down to 1.2 with adequate UOP. 











Objective: 





 Vital Signs











Temp Pulse Resp BP Pulse Ox


 


 36.8 C   77   25 H  98/45 L  96 


 


 01/25/17 10:00  01/25/17 10:00  01/25/17 10:00  01/25/17 10:00  01/25/17 10:00








 Laboratory Results





 01/25/17 04:40 





 01/25/17 04:40 





 











 01/24/17 01/25/17 01/26/17





 05:59 05:59 05:59


 


Intake Total 627 2079 


 


Output Total 450 1555 


 


Balance 177 524 








 











PT  23.4 SEC (12.0-15.0)  H  01/25/17  11:55    


 


INR  2.07  (0.83-1.16)  H  01/25/17  11:55    














Physical Exam





- Physical Exam


General Appearance: alert, no apparent distress


EENT: PERRL/EOMI, No scleral icterus (R), No scleral icterus (L)


Neck: non-tender, supple


Respiratory: lungs clear, normal breath sounds, No respiratory distress, No 

accessory muscle use, No rales, No rhonchi, No wheezing


Cardiac/Chest: normal peripheral pulses, regular rate, rhythm, systolic murmur


Abdomen: normal bowel sounds, non-tender, soft


Skin: normal color, warm/dry, No rash


Lymphatic: No no adenopathy


Extremities: non-tender, No pedal edema, No calf tenderness, No swelling


Neuro/Psych: alert, normal mood/affect, oriented x 3





ICD10 Worksheet


Patient Problems: 


 Problems











Problem Status Diagnosed


 


Coronary artery bypass grafting Active 


 


Hematuria syndrome Active 


 


Nausea and vomiting Active 


 


Non-healing surgical wound Active 


 


Acute combined systolic and diastolic CHF, NYHA class 3 Acute 


 


Chronic Disease Management/Transitional Care Program Acute 


 


Coronary arteriosclerosis Acute 


 


Gastrointestinal bleeding, lower Acute 


 


S/P CABG x 3 Acute

## 2017-01-26 NOTE — SOAPPROG
SOAP Progress Note


Assessment/Plan: 


1. Asystolic arrest - Pt presented with asystolic arrest in [place] ED.  He was 

treated with ACLS protocol and temporary pacemaker with return of a perfusing 

rhythm.


--> Await records from [place].





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  He reports 

symptoms of fatigue and bradycardia on pulse oximeter monitoring prior to ED 

evaluation.  Suspect his symptoms and event were secondary to a 

bradyarrhythmia.  He is s/p temporary pacemaker placement and will need 

permanent pacemaker placement.  Appreciate ID input.


--> Continue temporary pacemaker


--> Anticipate pacemaker placement on 1/27.





3. CAD - Pt has known CAD and is s/p CABG x 3 in 1/12 and PCI of his [place] to 

LAD graft in 2/14.  Pt denies symptoms of chest pain.  His EKG is unchanged.  

His troponin is mildly elevated.  He would likely benefit from angiogram prior 

to pacemaker placement to exclude obstructive CAD.


--> Continue medical management with asa and crestor.  No BB or ace secondary 

to low BP.


--> Angiogram today.





4. Aortic stenosis - Pt has a history of moderate to severe aortic stenosis.  

His recent asystolic arrest is most suggestive of arrhythmia as opposed to 

severe aortic stenosis.


--> Consider evaluation for TAVR when acute issues resolve.





5. PAF - Pt has a history of PAF.  He is managed with a rate control and 

anticoagulation strategy.


--> Continue current strategy





6. Hypercoagulable state - Pt has a history of a hyper coagulable state and 

pulmonary emboli.  He has transitioned from coumadin to heparin.


--> Will resume coumadin following invasive procedures.





7. RUL infiltrate - Pt has a RUL infiltrate suggestive of pneumonia.  ? 

community aquired vs aspiration.  WBC mildly elevated with left shift.  

Appreciate ID input.











Subjective: 


No chest pain


No orthopnea or PND


No ambulation secondary to temporary pacemaker.


Discussed angiogram and pacemaker placement.


Objective: 





 Vital Signs











Temp Pulse Resp BP Pulse Ox


 


 36.8 C   86   21 H  103/53 L  94 


 


 01/26/17 11:20  01/26/17 11:00  01/26/17 11:00  01/26/17 11:00  01/26/17 11:00








 Laboratory Results





 01/26/17 08:30 





 01/26/17 05:00 





 











 01/25/17 01/26/17 01/27/17





 05:59 05:59 05:59


 


Intake Total 7725 9656 


 


Output Total 0881 2600 


 


Balance 524 319 








 











PT  18.7 SEC (12.0-15.0)  H  01/26/17  08:30    


 


INR  1.56  (0.83-1.16)  H  01/26/17  08:30    














Physical Exam





- Physical Exam


General Appearance: alert, no apparent distress


Respiratory: other (Deminished breath sounds L base)


Cardiac/Chest: regular rate, rhythm, systolic murmur


Abdomen: normal bowel sounds, non-tender, soft


Extremities: No pedal edema


Neuro/Psych: alert, oriented x 3





ICD10 Worksheet


Patient Problems: 


 Problems











Problem Status Diagnosed


 


Coronary artery bypass grafting Active 


 


Hematuria syndrome Active 


 


Nausea and vomiting Active 


 


Non-healing surgical wound Active 


 


Acute combined systolic and diastolic CHF, NYHA class 3 Acute 


 


Chronic Disease Management/Transitional Care Program Acute 


 


Coronary arteriosclerosis Acute 


 


Gastrointestinal bleeding, lower Acute 


 


S/P CABG x 3 Acute

## 2017-01-26 NOTE — PDINTPN
Intensivist Progress Note


Assessment/Plan: 


Assessment/plan:


* Cardiac arrest - likely related to arrythmia (eg trifasicular block) with 

excellent neuro outcome. No changes. Cath today


* Heart block- remains on dopamine drip with minimal ectopy and maintaining 

rhythm with occasional pacer capture. Permanent pacer likely 1/27


* PNA? I suspect aspiration since he lacked symptoms prior to his event. However

, cannot exclude bacterial CAP so started Zosyn (for anaerobes) 1/24. ID to see 

and cleared for permanent pacer as long as cultures remain negative. 


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 Stable on heparin 

drip


* HTN- stable on DA


* ABDIAS?- Resolved. creatinine down to 1.2 with adequate UOP. 


* Constipation- Start bowel regimen














01/26/17 14:00








01/26/17 14:01





Objective: 





 Vital Signs











Temp Pulse Resp BP Pulse Ox


 


 36.8 C   80   28 H  103/50 L  95 


 


 01/26/17 11:20  01/26/17 12:00  01/26/17 12:00  01/26/17 12:00  01/26/17 12:00








 Laboratory Results





 01/26/17 08:30 





 01/26/17 05:00 





 











 01/25/17 01/26/17 01/27/17





 05:59 05:59 05:59


 


Intake Total 2079 2919 


 


Output Total 1555 2600 


 


Balance 524 319 








 











PT  18.7 SEC (12.0-15.0)  H  01/26/17  08:30    


 


INR  1.56  (0.83-1.16)  H  01/26/17  08:30    














Physical Exam





- Physical Exam


General Appearance: alert, no apparent distress


EENT: PERRL/EOMI, normal ENT inspection


Neck: full range of motion, supple


Respiratory: lungs clear, normal breath sounds, No respiratory distress


Cardiac/Chest: normal peripheral pulses, regular rate, rhythm


Abdomen: normal bowel sounds, non-tender, soft


Skin: normal color, warm/dry, No rash


Lymphatic: No no adenopathy


Extremities: non-tender, No pedal edema


Neuro/Psych: alert, normal mood/affect, oriented x 3





ICD10 Worksheet


Patient Problems: 


 Problems











Problem Status Diagnosed


 


Coronary artery bypass grafting Active 


 


Hematuria syndrome Active 


 


Nausea and vomiting Active 


 


Non-healing surgical wound Active 


 


Acute combined systolic and diastolic CHF, NYHA class 3 Acute 


 


Chronic Disease Management/Transitional Care Program Acute 


 


Coronary arteriosclerosis Acute 


 


Gastrointestinal bleeding, lower Acute 


 


S/P CABG x 3 Acute

## 2017-01-27 NOTE — SOAPPROG
SOAP Progress Note


Assessment/Plan: 


1. Asystolic arrest - Pt presented with asystolic arrest in [place] ED.  He was 

treated with ACLS protocol and temporary pacemaker with return of a perfusing 

rhythm.


--> Await records from [place].





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  He reports 

symptoms of fatigue and bradycardia on pulse oximeter monitoring prior to ED 

evaluation.  Suspect his symptoms and event were secondary to a 

bradyarrhythmia.  He is s/p temporary pacemaker placement and is scheduled for 

permanent pacemaker today.





3. CAD - Pt has known CAD and is s/p CABG x 3 in 1/12 and PCI of his [place]ima to 

LAD graft in 2/14.  Angiogram on 1/26 with patent grafts and no evidence of 

significant ISR.


--> Continue medical management with asa and crestor.


--> Consider coreg a BP tolerates.





4. Aortic stenosis - Pt has a history of moderate to severe aortic stenosis.  

His recent asystolic arrest is most suggestive of arrhythmia as opposed to 

severe aortic stenosis.


--> Consider evaluation for TAVR when acute issues resolve.





5. PAF - Pt has a history of PAF.  He is managed with a rate control and 

anticoagulation strategy.


--> Continue current strategy





6. Hypercoagulable state - Pt has a history of a hyper coagulable state and 

pulmonary emboli.  He has transitioned from coumadin to heparin.


--> Resume coumadin following invasive procedures.





7. RUL infiltrate - Pt has a RUL infiltrate suggestive of pneumonia.  ? 

community aquired vs aspiration.  WBC mildly elevated with left shift.  

Appreciate ID input and critical care input.








Subjective: 


No chest pain


No orthopnea or PND


Ready for pacemaker today


limited mobility secondary to temporary pacemaker.


Objective: 





 Vital Signs











Temp Pulse Resp BP Pulse Ox


 


 37.7 C   107 H  18   93/65 L  100 


 


 01/27/17 12:00  01/27/17 14:15  01/27/17 14:15  01/27/17 14:15  01/27/17 14:15








 Laboratory Results





 01/27/17 06:30 





 01/27/17 06:30 





 











 01/26/17 01/27/17 01/28/17





 05:59 05:59 05:59


 


Intake Total 2919 1713 


 


Output Total 2600 3150 


 


Balance 319 -1437 








 











PT  18.3 SEC (12.0-15.0)  H  01/27/17  06:30    


 


INR  1.52  (0.83-1.16)  H  01/27/17  06:30    














Physical Exam





- Physical Exam


General Appearance: alert, no apparent distress


Respiratory: other (deminished breath sounds L base.)


Cardiac/Chest: systolic murmur, irregularly irregular


Abdomen: normal bowel sounds, non-tender, soft


Extremities: No pedal edema


Neuro/Psych: alert





ICD10 Worksheet


Patient Problems: 


 Problems











Problem Status Diagnosed


 


Coronary artery bypass grafting Active 


 


Hematuria syndrome Active 


 


Nausea and vomiting Active 


 


Non-healing surgical wound Active 


 


Acute combined systolic and diastolic CHF, NYHA class 3 Acute 


 


Chronic Disease Management/Transitional Care Program Acute 


 


Coronary arteriosclerosis Acute 


 


Gastrointestinal bleeding, lower Acute 


 


S/P CABG x 3 Acute

## 2017-01-27 NOTE — EPPROC
Electrophysiology Procedure Note: 


PROCEDURE PERFORMED:





* Implantation of an A/V Pacemaker


* 


* Fluoroscopy





INDICATION:         


                  


This is a 82 yr old with moderate to severe AS, pulm scarring, trifascicular 

block, who had asystolic arrest.  Hence, it was decided to implant a dual 

chamber pacemaker. 








PROCEDURE NOTE:





Patient presented to the cardiac catheterization laboratory in a fasting, post 

absorptive state.  Cardiac cath lab nurse administered moderate sedation. The 

left infraclavicular area was prepped and draped in the usual sterile fashion.  

Lidocaine plus bupivacaine was used for local anesthesia.  





Left subclavian venography was performed by injection of iodinated contrast 

into the left antecubital vein.  This was done to assure patency of the vein 

and also to assess for any anatomical aberrations.  Using a combination of 

blunt and sharp dissection and electrocautery, the dissection was carried down 

to the prepectoral fascia.    All bleeding was controlled with electrocautery. 

   Fluoroscopy was utilized during the entire procedure for venous access and 

placement of the leads.





Using the usual technique, left cephalic vein was accessed and a glidewire was 

placed.  Through this initially a 9F and later a 7F sheath was passed.  

Placement of the guidewires into the venous system was confirmed by low-

pressure blood return and also by visualizing the guidewires advancing into the 

inferior vena cava.  A purse string suture was applied around the guidewires.





An active fixation ventricular lead was advanced into the right ventricular 

apex and screwed in place. An active fixation atrial lead was advanced into the 

right atrial appendage and screwed in place. The peel away sheaths were 

removed.  Pacing thresholds, sensing parameters and lead impedances were 

measured.  There was no diaphragmatic stimulation at maximum output.  The leads 

were sutured to the prepectoral fascia with 3 nonabsorbable sutures each.


 


The pocket was created and it was flushed using antibiotic solution.  It was 

inspected for any bleeding.  The leads were attached to the pacemaker securely.

  The pacemaker was inserted into the pocket and secured in place with a 

nonabsorbable suture.  





Fluoroscopy was performed in DRISCOLL and [place]oc[place] planes to verify right-sided placement 

of the leads.  Also fluoroscopy of the pacemaker pocket was performed.


Temporary wire was removed under fluoroscopic guidance. 


The pacemaker pocket was closed in 3 layers with absorbable vicryl sutures. 

Steristrips were placed.  Appropriate dressing was applied.  The patient left 

the cardiac catheterization laboratory in stable condition.


Serial Numbers:      


* Device:   Biotronik Eluna 8   SN [id number]


* Atrial Lead:   Biotronik  Solia S45  SN [id number]


* Ventricular Lead: Biotronik  Solia S53  SN [id number]





Stimulation Thresholds & Impedance Measurements:





* Atrial Lead    2.3mV, 0.8@0.4ms, 448Ohms


* Ventricular Lead    8.2mV, 0.6@0.4ms, 604Ohms





Pradeep Pacing Parameters





* Pacing mode:   DDD CLS


* Lower rate:   80


* Upper tracking rate: 120


* Upper sensor rate: 120





Patient Problems: 


 Problems











Problem Status Diagnosed


 


Coronary artery bypass grafting Active 


 


Hematuria syndrome Active 


 


Nausea and vomiting Active 


 


Non-healing surgical wound Active 


 


Acute combined systolic and diastolic CHF, NYHA class 3 Acute 


 


Chronic Disease Management/Transitional Care Program Acute 


 


Coronary arteriosclerosis Acute 


 


Gastrointestinal bleeding, lower Acute 


 


S/P CABG x 3 Acute

## 2017-01-27 NOTE — CPEKG
Heart Rate: 101

RR Interval: 594

P-R Interval: 211

QRSD Interval: 160

QT Interval: 412

QTC Interval: 535

P Axis: 0

QRS Axis: -69

T Wave Axis: 14

EKG Severity - ABNORMAL ECG -

EKG Impression: SINUS TACHYCARDIA

EKG Impression: supraventricular BIGEMINY

EKG Impression: RBBB AND LAFB

Electronically Signed By: Jacky Marc 27-Jan-2017 19:23:45

## 2017-01-27 NOTE — PDINTPN
Intensivist Progress Note


Assessment/Plan: 


Assessment/plan:


* Cardiac arrest - likely related to arrythmia (eg trifasicular block) with 

excellent neuro outcome. No changes. Cath without significant lesions. For 

pacer today


* Heart block- remains on dopamine drip with minimal ectopy and maintaining 

rhythm with occasional pacer capture. Permanent pacer 1/27


* PNA? I suspect aspiration since he lacked symptoms prior to his event. However

, cannot exclude bacterial CAP so started Zosyn (for anaerobes) 1/24. ID to see 

and cleared for permanent pacer as long as cultures remain negative. 


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 Developed 

hematuria and mild BRBPR today with BM. Adequate UOP. Heparin stopped for pacer 

p[lacement. Observe closely for obstruction and possible need for CBI. GI 

source likely fissures or hemorrhoids. Should resolve without intervention. 

Follow H/H


* HTN- currently requires DA to support BP but may be better after permanent 

pacer. If not will need central line (eg, PICC). 


* ABDIAS- Resolved. creatinine down to 1.2 with adequate UOP. 


* Constipation- Start bowel regimen








Objective: 





 Vital Signs











Temp Pulse Resp BP Pulse Ox


 


 37.7 C   77   21 H  103/42 L  99 


 


 01/27/17 12:00  01/27/17 12:00  01/27/17 12:00  01/27/17 12:00  01/27/17 12:00








 Laboratory Results





 01/27/17 06:30 





 01/27/17 06:30 





 











 01/26/17 01/27/17 01/28/17





 05:59 05:59 05:59


 


Intake Total 2919 1713 


 


Output Total 2600 3150 


 


Balance 319 -1437 








 











PT  18.3 SEC (12.0-15.0)  H  01/27/17  06:30    


 


INR  1.52  (0.83-1.16)  H  01/27/17  06:30    














Physical Exam





- Physical Exam


General Appearance: alert, no apparent distress


EENT: normal ENT inspection


Neck: full range of motion, supple


Respiratory: lungs clear, normal breath sounds, No respiratory distress, No 

rales, No wheezing


Cardiac/Chest: normal peripheral pulses, regular rate, rhythm


Abdomen: normal bowel sounds, non-tender, soft


Skin: normal color, warm/dry, No rash


Extremities: normal range of motion, No pedal edema


Neuro/Psych: alert, normal mood/affect, oriented x 3





ICD10 Worksheet


Patient Problems: 


 Problems











Problem Status Diagnosed


 


Coronary artery bypass grafting Active 


 


Hematuria syndrome Active 


 


Nausea and vomiting Active 


 


Non-healing surgical wound Active 


 


Acute combined systolic and diastolic CHF, NYHA class 3 Acute 


 


Chronic Disease Management/Transitional Care Program Acute 


 


Coronary arteriosclerosis Acute 


 


Gastrointestinal bleeding, lower Acute 


 


S/P CABG x 3 Acute

## 2017-01-28 NOTE — PDINTPN
Intensivist Progress Note


Assessment/Plan: 


Assessment/plan:


82 M with known moderate-severe AS, previous CAD/CABG, trifasicular block and 

multiple PE's with factor V Leiden deficiancy on long term anticoagulation 

transferred to [facility] from [place] after witnessed asystolic cardiac arrest. He went 

[place] "AMA" and went to the ED with reports of HR 30's despite normal O2 sats. 

During his NEGRON he developed increasingly long sinus pauses/bradycardic episodes 

that deteriorated into asystole with loss of pulse. Immediate CPR was perfomed 

per ACLS protocol, though he was unable to be intubated) and once ROSC was 

achived (<10 minutes) he had a temporary pacer placed then was transferred to 

[facility]. 


 


* Cardiac arrest - likely related to arrythmia (eg trifasicular block) with 

excellent neuro outcome. No changes. Cath at [facility] 1/26 without significant 

lesions. Permanent pacer placed 1/27 without complications. 


* Heart block- As above


* Hypotension- remains on low dose DA but titrating off. Per cards MAP of 55 

acceptable. 


* PNA? I suspect aspiration since he lacked significant symptoms prior to his 

event, though his CXR did have infiltrates. However, cannot exclude bacterial 

CAP so started Zosyn (for anaerobes) 1/24. ID following and planning 5-7 days 

abx.  


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 Resumed coumadin 

today. 


* Hematuria- briefly 1/27, now resolving without evidence of obstruction


* HTN- as above


* ABDIAS- Resolved. creatinine down to 1.2 with adequate UOP. 


* Constipation- Resolved with BM 1/27. No complaints today. 











01/28/17 13:19








01/28/17 13:20





Objective: 





 Vital Signs











Temp Pulse Resp BP Pulse Ox


 


 36.4 C   76   22 H  91/62 L  96 


 


 01/28/17 07:00  01/28/17 12:00  01/28/17 12:00  01/28/17 12:00  01/28/17 12:00








 Laboratory Results





 01/28/17 05:30 





 01/28/17 05:30 





 











 01/27/17 01/28/17 01/29/17





 05:59 05:59 05:59


 


Intake Total 1713 1070 


 


Output Total 3150 1700 


 


Balance -1437 -630 








 











PT  18.3 SEC (12.0-15.0)  H  01/27/17  06:30    


 


INR  1.52  (0.83-1.16)  H  01/27/17  06:30    














Physical Exam





- Physical Exam


General Appearance: alert, no apparent distress


EENT: PERRL/EOMI, normal ENT inspection


Neck: full range of motion, supple


Respiratory: lungs clear, normal breath sounds, No respiratory distress, No 

rales, No rhonchi, No wheezing


Cardiac/Chest: normal peripheral pulses, regular rate, rhythm, No edema


Abdomen: normal bowel sounds, non-tender, soft, other (no hematoma at groin site

)


Skin: normal color, warm/dry, No rash


Lymphatic: no adenopathy


Extremities: normal range of motion, non-tender, No pedal edema


Neuro/Psych: alert, normal mood/affect, oriented x 3





ICD10 Worksheet


Patient Problems: 


 Problems











Problem Status Diagnosed


 


Coronary artery bypass grafting Active 


 


Hematuria syndrome Active 


 


Nausea and vomiting Active 


 


Non-healing surgical wound Active 


 


Acute combined systolic and diastolic CHF, NYHA class 3 Acute 


 


Chronic Disease Management/Transitional Care Program Acute 


 


Coronary arteriosclerosis Acute 


 


Gastrointestinal bleeding, lower Acute 


 


S/P CABG x 3 Acute

## 2017-01-28 NOTE — SOAPPROG
SOAP Progress Note


Assessment/Plan: 


1. Pradeep arrest - Pt presented to [place] ED with symptoms of fatigue and dyspnea.

  While on monitoring he had a bradycardic arrest.  He was treated with ACLS 

protocol and temporary pacemaker with return of a perfusing rhythm.





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  He reports 

symptoms of fatigue and bradycardia prior to ED evaluation.  Suspect his 

symptoms and event were secondary to a bradyarrhythmia.  He is s/p permanent 

pacemaker placement.


--> CXR today


--> Pacemaker check today





3. CAD - Pt has known CAD and is s/p CABG x 3 in 1/12 and PCI of his [place] to 

LAD graft in 2/14.  Angiogram on 1/26 with patent grafts and no evidence of 

significant ISR.


--> Continue medical management with asa and crestor.


--> Consider coreg a BP tolerates.





4. Aortic stenosis - Pt has a history of moderate to severe aortic stenosis.  

His recent asystolic arrest is most suggestive of arrhythmia as opposed to 

severe aortic stenosis.


--> Consider evaluation for TAVR when acute issues resolve.





5. PAF - Pt has a history of PAF.  He is managed with a rate control and 

anticoagulation strategy.


--> Continue current strategy





6. Hypercoagulable state - Pt has a history of a hyper coagulable state and 

pulmonary emboli.  He has transitioned from coumadin to heparin.


--> Resume coumadin coumadin today.





7. RUL infiltrate - Pt has a RUL infiltrate suggestive of pneumonia.  ? 

community aquired vs aspiration.  WBC mildly elevated with left shift.  

Appreciate ID input and critical care input.





8. Hypotension - Pt continues to require low dose dopamine to maintain BP.  

Will give and IVF and try to wean dopamine off.  


--> Consider picc line.





Subjective: 


R rib pain


No orthopnea or PND


No ambulation secondary to R femoral venous line


hematuria resolving.


Objective: 





 Vital Signs











Temp Pulse Resp BP Pulse Ox


 


 36.6 C   82   22 H  85/52 L  99 


 


 01/28/17 05:00  01/28/17 06:00  01/28/17 06:00  01/28/17 06:00  01/28/17 06:00








 Laboratory Results





 01/28/17 05:30 





 01/28/17 05:30 





 











 01/27/17 01/28/17 01/29/17





 05:59 05:59 05:59


 


Intake Total 1713 1070 


 


Output Total 3150 1700 


 


Balance -1437 -630 








 











PT  18.3 SEC (12.0-15.0)  H  01/27/17  06:30    


 


INR  1.52  (0.83-1.16)  H  01/27/17  06:30    














Physical Exam





- Physical Exam


General Appearance: alert, no apparent distress


Respiratory: other (deminished breath sounds L base)


Abdomen: normal bowel sounds, non-tender, soft


Skin: other (No hematoma pacemaker site)


Extremities: No pedal edema


Neuro/Psych: alert





ICD10 Worksheet


Patient Problems: 


 Problems











Problem Status Diagnosed


 


Coronary artery bypass grafting Active 


 


Hematuria syndrome Active 


 


Nausea and vomiting Active 


 


Non-healing surgical wound Active 


 


Acute combined systolic and diastolic CHF, NYHA class 3 Acute 


 


Chronic Disease Management/Transitional Care Program Acute 


 


Coronary arteriosclerosis Acute 


 


Gastrointestinal bleeding, lower Acute 


 


S/P CABG x 3 Acute

## 2017-01-29 NOTE — ECHO
[id number]

[id number]



+---------------------------------------------------+      [address]

:                                                   :       [place] [address]

:                                                   :           [phone]

+---------------------------------------------------+       Fax [phone]



                       Adult Echocardiographic Report



+----------------------------------------------------------------------------

---------+

:Name: FAINA EDWARDSminervajose guadalupe Date: 2017 11:36 AM                    

         :

:MRN: [id number]          Hospital Admission Number: [id number] Richiqasim

keyana: 250:

:: 1934          Gender: Male                                       

         :

:Age: 82 yrs              Race: WH                                           

         :

:History: Pacemaker                                                          

         :

+----------------------------------------------------------------------------

---------+



MMode/2D Measurements & Calculations

IVSd: 1.6 cm         RVDd: 4.3 cm FS: 28.1 %          LVLd ap4: 7.2 cm

LVPWd: 1.4 cm        LVIDd: 3.6 cmEDV(Teich): 52.9 ml EDV(MOD-sp4): 87.0 ml

                     LVIDs: 2.6 cmESV(Teich): 23.6 ml LVLs ap4: 5.9 cm

                                  EF(Teich): 55.3 %   ESV(MOD-sp4): 38.0 ml

                                                      EF(MOD-sp4): 56.3 %

        _____________________________________________________________

SV(MOD-sp4): 49.0 ml



Normal Measurement Values:

+----------------------------------------------------------------------------

----------------------------------+

:LVIDd (3.5-5.7cm)    IVSd (0.6-1.1cm)     LVPWd (0.6-1.1cm)     Aortic Root 

(2.0-3.7cm)Left Atrium (1.5-4.0cm):

:LV Vol(d) (76-115ml) LV Vol(s) (29-48ml)  Ejec Fraction (50-65%)PV Paxton (0.6-

1.2m/s)    TV Paxton (0.4-1.0m/s)    :

:MV E Paxton (0.8-1.0m/s)MV A Paxton (0.3-1.0m/s)LVOT Paxton (0.7-1.2m/s) Asc Ao Paxton (

0.9-1.8m/s)                       :

+----------------------------------------------------------------------------

----------------------------------+



Doppler Measurements & Calculations

MV E max patxon:      MV V2 max:        Ao V2 max:         AI max paxton:

83.9 cm/sec        112.0 cm/sec      306.2 cm/sec       319.7 cm/sec

MV A max paxton:      MV max PG:        Ao max PG:         AI max P.9 mmHg

80.5 cm/sec        5.0 mmHg          37.5 mmHg          AI dec slope:

MV E/A: 1.0        MV V2 mean:       Ao mean P.5 cm/sec2

MV dec time:       78.3 cm/sec       16.5 mmHg          AI P1/2t: 271.8 msec

0.15 sec           MV mean PG:       Ao V2 mean:

                   2.7 mmHg          177.6 cm/sec

                   MV V2 VTI: 23.2 cmAo V2 VTI: 68.2 cm

        _____________________________________________________________

LV V1 mean PG:     MR max paxton:       PA V2 max:         PI end-d paxton:

1.3 mmHg           311.5 cm/sec      84.7 cm/sec        89.0 cm/sec

LV V1 mean:        MR max PG:        PA max P.2 cm/sec        39.2 mmHg         2.9 mmHg

LV V1 VTI: 15.1 cm



        _____________________________________________________________

TR max paxton:

343.9 cm/sec

TR max P.3 mmHg





Left Ventricle

The left ventricle is normal in size. There is mild concentric left

ventricular hypertrophy. There is Doppler evidence for diastolic

dysfunction. Ejection Fraction = 45 to 50%. Flattened septum is consistent

with RV pressure overload.





Right Ventricle

The right ventricle is moderately dilated. There is a pacemaker lead in the

right ventricle. The right ventricular systolic function is mildly reduced.



Atria

The left atrium is mildly dilated. The right atrium is moderately dilated.



Mitral Valve

The mitral valve leaflets appear thickened, but open well. There is no

mitral valve stenosis. There is mild to moderate mitral regurgitation.



Tricuspid Valve

The tricuspid valve is normal in structure and function. There is no

tricuspid stenosis. There is moderate tricuspid regurgitation.



Aortic Valve

The aortic valve is trileaflet. All three leaflets are are thickened and

calcified. Moderate to severe valvular aortic stenosis. Moderate aortic

regurgitation.





Pulmonic Valve

The pulmonic valve is not well visualized. There is no pulmonic valvular

stenosis. Mild pulmonic valvular regurgitation.



Great Vessels

The aortic root is normal size.



Pericardium/Pleural

There is a fat pad seen. Small pericardial effusion.



Conclusion

A complete two-dimensional transthoracic echocardiogram was performed (2D,

M-mode, Doppler and color flow Doppler).

Patient supine for duration of exam.

1. The left ventricle is normal in size. There is mild concentric left

ventricular hypertrophy. The Ejection Fraction = 45 to 50%. Flattened septum

is consistent with RV pressure overload.

2. The right ventricle is moderately dilated. The right ventricular systolic

function is mildly reduced.

3. The right atrium is moderately dilated.

4. The mitral valve leaflets appear thickened, but open well. There is mild

to moderate mitral regurgitation.

5. The aortic valve is trileaflet. All three leaflets are are thickened and

calcified. Moderate to severe valvular aortic stenosis. Moderate aortic

regurgitation.

6. Small pericardial effusion.

7. When compared to the 17 study. The LVEF has improved from 35/40 to

45/50%. A trivial to small pericardial effusion is now present.





_____________________________________________________________________________





Final Reading Physician:

                        Gamal Garrett MD  electronically signed on 2017

                        03:26 PM

Ordering Physician: EDGARD CARRILLO

Performed By: Josefa Martínez

## 2017-01-29 NOTE — SOAPPROG
SOAP Progress Note


Assessment/Plan: 


1. Heavenly arrest - Pt presented to [facility] with symptoms of fatigue and dyspnea.

  While on monitoring he had a bradycardic arrest.  He was treated with ACLS 

protocol and temporary pacemaker with return of a perfusing rhythm.





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  He reports 

symptoms of fatigue and bradycardia prior to ED evaluation.  Suspect his 

symptoms and event were secondary to a bradyarrhythmia.  He is s/p permanent 

pacemaker placement.





3. CAD - Pt has known CAD and is s/p CABG x 3 in 1/12 and PCI of his [place] to 

LAD graft in 2/14.  Angiogram on 1/26 with patent grafts and no evidence of 

significant ISR.


--> Continue medical management with asa and crestor.


--> Consider coreg a BP tolerates.





4. Aortic stenosis - Pt has a history of moderate to severe aortic stenosis.  

His recent asystolic arrest is most suggestive of arrhythmia as opposed to 

severe aortic stenosis.


--> Consider evaluation for TAVR when acute issues resolve.





5. PAF - Pt has a history of PAF.  He is managed with a rate control and 

anticoagulation strategy.


--> Continue current therapy





6. Hypercoagulable state - Pt has a history of a hyper coagulable state and 

pulmonary emboli.  anticoagulation is on hold secondary to pacemaker pocket 

hematoma.


--> Resume heparin to coumadin when appropriate.





7. RUL infiltrate - Pt has a RUL infiltrate suggestive of pneumonia.  ? 

community aquired vs aspiration.  WBC mildly elevated with left shift.  

Appreciate ID input and critical care input.





8. Hypotension - Pt has required pressor support since heavenly arrest.  His 

dopamine requirements increased from 5 mcg to 11 mcg overnight.  His HR 

increased from the 80's to low 100's.  His Hgb decreased from 11.5 to 10.3.  He 

has a new pacemaker pocket hematoma.  Suspect hypovolemia from pacemaker pocket 

bleed.


--> Echocardiogram to exclude tamponade


--> IVF


--> Hold heparin.











Subjective: 


No chest pain


No orthopnea or PND


Ambulating


+ pacemaker hematoma


increased dopamine requirements overnight.


Objective: 





 Vital Signs











Temp Pulse Resp BP Pulse Ox


 


 36.7 C   67   23 H  114/48 L  99 


 


 01/29/17 12:00  01/29/17 14:00  01/29/17 14:00  01/29/17 14:00  01/29/17 14:00








 Laboratory Results





 01/29/17 06:00 





 01/29/17 06:00 





 











 01/28/17 01/29/17 01/30/17





 05:59 05:59 05:59


 


Intake Total 1070 3789 


 


Output Total 1700 2000 1200


 


Balance -630 1789 -1200








 











PT  19.2 SEC (12.0-15.0)  H  01/29/17  09:33    


 


INR  1.61  (0.83-1.16)  H  01/29/17  09:33    














Physical Exam





- Physical Exam


General Appearance: alert, no apparent distress


Respiratory: other (deminished breath sounds L base)


Cardiac/Chest: tachycardia, other (pacemaker pocket hematoma)


Abdomen: non-tender, soft


Extremities: pedal edema


Neuro/Psych: alert





ICD10 Worksheet


Patient Problems: 


 Problems











Problem Status Diagnosed


 


Coronary artery bypass grafting Active 


 


Hematuria syndrome Active 


 


Nausea and vomiting Active 


 


Non-healing surgical wound Active 


 


Acute combined systolic and diastolic CHF, NYHA class 3 Acute 


 


Chronic Disease Management/Transitional Care Program Acute 


 


Coronary arteriosclerosis Acute 


 


Gastrointestinal bleeding, lower Acute 


 


S/P CABG x 3 Acute

## 2017-01-29 NOTE — CT
CT Chest Angiogram          January 29, 2017 at 1748 Hours



Indication:  Shortness of breath. Evaluate for pulmonary embolism.



Technique:  Thinly collimated multidetector helical CT imaging was performed through the chest while 
90 mL of Isovue-370 were injected intravenously without complication.  The images were then transferr
ed to an independent workstation where multiplanar reconstructions were performed. Dose reduction isra
hniques were utilized.



Comparison: CT chest dated June 17, 2013 and chest radiographs dated January 24, 2017 and January 28, 2017.



Findings



CT chest angiogram:  The pulmonary arterial system is well opacified.  No intraluminal filling defect
s to suggest acute or chronic thrombopulmonary embolic disease. The thoracic aorta is atherosclerotic
 with mixed calcified and noncalcified plaque. No dissection or penetrating aortic ulcer. Features of
 previous coronary artery bypass grafting consists of grafts originating off the ascending aorta.



CT chest: A small right pleural effusion measuring 1-2 cm in thickness at the posterior base is halle
guous with a small 2.5 x 4.5 cm locule along the major fissure. Trace dependent left pleural effusion
. No loculation on the left. No pericardial effusion.



Bibasilar atelectasis and consolidation, worse at the left base. A left lobe has been resected, resul
ting in left hemithorax volume loss and shift of the mediastinum to the left. No central endobronchia
l lesion or mucous plugging. Minimal linear atelectasis is present in the right middle lobe and right
 upper lobe. Numerous small 5-mm groundglass nodular opacities are scattered throughout the apical se
gment right upper lobe. No stellate pulmonary lesion.



The heart is minimally enlarged with three-vessel calcified coronary plaque and stigmata of coronary 
artery bypass grafting. Midline sternal wires are intact. The sternum is incompletely osseous fused. 
A new left anterior chest wall pacemaker generator has surrounding fluid and gas bubbles. The leads i
n the right atrium and right ventricle appear to be well seated. No pericardial effusion.



Acute anterior right 3rd, 4th, and 5th rib fractures. Subacute posterior left 9th rib fracture has mi
nimal healing callus. Old deformed healed low right rib fractures are present along the midaxillary l
ine. Deformity of the posterior left hemithorax is unchanged, consistent with left thoracic surgery. 
Limited imaged portion of the upper abdomen is negative.



Impression:

1. No evidence of thrombopulmonary embolic disease.

2. Small right and trace left pleural effusions.

3. Bibasilar consolidation, worse in the left base, may merely represent atelectasis given the low charlene
ng volumes.

4. New left anterior chest wall dual-lead pacemaker with well positioned leads. No pericardial effusi
on.

5. Acute anterior right 3rd, 4th, and 5th rib fractures. 

6. Nonspecific subcentimeter groundglass nodules in the right apex are likely infectious or inflammat
ory in origin. Recommend intermittent surveillance.

## 2017-01-29 NOTE — DX
AP Portable Chest          January 27, 2017



Indication: Evaluate line placement.



Comparison: January 24, 2017.



Findings: Right-sided PICC terminates in SVC. Pleural effusion, pulmonary edema, and pulmonary infilt
rates are otherwise unchanged. Left-sided pacemaker leads are noted.



Impression: Right-sided PICC in SVC.

## 2017-01-29 NOTE — PDINTPN
Intensivist Progress Note


Assessment/Plan: 


Assessment/plan:


82 M with known moderate-severe AS, previous CAD/CABG, trifasicular block and 

multiple PE's with factor V Leiden deficiancy on long term anticoagulation 

transferred to [facility] from [place] after witnessed asystolic cardiac arrest. He went 

[place] "AMA" and went to the ED with reports of HR 30's despite normal O2 sats. 

During his NEGRON he developed increasingly long sinus pauses/bradycardic episodes 

that deteriorated into asystole with loss of pulse. Immediate CPR was perfomed 

per ACLS protocol, though he was unable to be intubated) and once ROSC was 

achived (<10 minutes) he had a temporary pacer placed then was transferred to 

[facility]. 


 


* Cardiac arrest - likely related to arrythmia (eg trifasicular block) with 

excellent neuro outcome. No changes. Cath at [facility] 1/26 without significant 

lesions. Permanent pacer placed 1/27


* Hematoma at pacer site. Xa levels have remained at upper normal so continuing 

to reduce dose and remain therapeutic. If bleeding continues may have to hold. 


* Heart block- As above


* Hypotension- Persists. RN reports only able to give 250 ml bolus via PICC (?) 

but SVI delta was >10% so gave additional NS bolus and increased NS drip from 

125 to 150. Change to levophed since rate not an issue and hope to get more 

stable BP. Target SBP >90.  


* PNA? I suspect aspiration since he lacked significant symptoms prior to his 

event, though his CXR did have infiltrates. However, cannot exclude bacterial 

CAP so started Zosyn (for anaerobes) 1/24 and should be able to dc now. 


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 Resumed coumadin 1/ 28. 


* Hematuria- briefly 1/27, cleared and returned again today. Likely loya 

trauma and should resolve. No evidence of obstruction. 


* HTN- as above


* ABDIAS- Resolved- adequate UOP. 


* Constipation- Resolved with BM 1/27.











01/28/17 13:19








01/28/17 13:20








01/29/17 12:27





Objective: 





 Vital Signs











Temp Pulse Resp BP Pulse Ox


 


 36.7 C   88   20   116/51 L  99 


 


 01/29/17 12:00  01/29/17 12:00  01/29/17 12:00  01/29/17 12:00  01/29/17 12:00








 Laboratory Results





 01/29/17 06:00 





 01/29/17 06:00 





 











 01/28/17 01/29/17 01/30/17





 05:59 05:59 05:59


 


Intake Total 1070 3789 


 


Output Total 1700 2000 


 


Balance -630 1789 








 











PT  19.2 SEC (12.0-15.0)  H  01/29/17  09:33    


 


INR  1.61  (0.83-1.16)  H  01/29/17  09:33    














Physical Exam





- Physical Exam


General Appearance: alert, no apparent distress


EENT: PERRL/EOMI, pharynx normal


Neck: full range of motion, supple


Respiratory: lungs clear, normal breath sounds, No respiratory distress, No 

rales, No rhonchi, No wheezing


Cardiac/Chest: normal peripheral pulses, regular rate, rhythm, No edema


Abdomen: normal bowel sounds, non-tender, soft, No distended, No guarding


Skin: warm/dry, No rash


Extremities: non-tender, No pedal edema


Neuro/Psych: alert, normal mood/affect, oriented x 3





ICD10 Worksheet


Patient Problems: 


 Problems











Problem Status Diagnosed


 


Coronary artery bypass grafting Active 


 


Hematuria syndrome Active 


 


Nausea and vomiting Active 


 


Non-healing surgical wound Active 


 


Acute combined systolic and diastolic CHF, NYHA class 3 Acute 


 


Chronic Disease Management/Transitional Care Program Acute 


 


Coronary arteriosclerosis Acute 


 


Gastrointestinal bleeding, lower Acute 


 


S/P CABG x 3 Acute

## 2017-01-29 NOTE — IR
Imaging Guided Peripherally Inserted Central Catheter



History:  Central line access for multiple medications.



Prophylactic Antibiotic:  Cefazolin was not ordered and administered for antimicrobial prophylaxis be
cause it was not medically necessary for this procedure.



VTE Prophylaxis:  There is not an order for VTE prophylaxis to be given within 24 hours after procedu
re end time because it was not medically necessary for this procedure.



Crosscutting Measure:  Patient's current list of medications including all known prescriptions, over-
the-counters, herbals, and vitamin/mineral/dietary supplements are reviewed.  Medications' name, dosa
ge, frequency, and route of administration are confirmed.



Patient is a non-smoker.



Technique: This procedure is performed at patient's bedside. No fluoroscopy was utilized. Following i
nformed consent, the right arm was prepped and draped in sterile fashion. 1% Xylocaine was used for l
ocal anesthetic.   All elements of maximal sterile barrier technique including cap, mask, sterile demi
n, sterile gloves, large sterile sheet, hand hygiene, and 2% chlorhexidine for cutaneous antisepsis, 
followed. 



Ultrasound evaluation of potential access site was performed. After successfully identifying a patent
 vessel, ultrasound guidance was used to puncture the vein. A permanent recording was created for the
 patient's record. 



Ultrasound transducer was placed in sterile sleeve and used for real-time imaging guidance over steri
le gel to enter the basilic vein. 0.018 measuring wire was passed centrally. A skin nick with scalpel
 blade was followed by removing the access needle. A 5 Yemeni peel-away sheath was followed by a  5 F
rench double-lumen central catheter, trimmed to 41 cm length. The tip of the catheter was positioned 
centrally and the guidewire removed.  The hub of the catheter was fixed to the skin using a sterile S
tatLock adhesive device, and a sterile dressing was applied. The catheter was irrigated.



Fluoroscopy:  0 min

Dose:  0 mGy

Exposures:  0 images



Impression:  5 Yemeni double lumen peripherally inserted central catheter. Chest x-ray to follow to e
valuate tip placement.

## 2017-01-30 NOTE — PDCARPN
Cardiology Progress Note


Chief Complaint: 


no complaints today





Assessment/Plan: 


Assessment:


Patient is an 83 y/o male, well known to [facility] (Dr. ANA MARIA Rushing), with 

history of CAD s/p CABG (3V, 2012), PCI (LIMA graft, 2014), HTN, HLP, aortic 

stenosis (last mean aortic gradient with [facility] was 32 mm Hg), atrial 

fibrillation ([id number] score of 4), CHF (last [place] [facility] with EF at 50%), 

CRI, prostate cancer with mets to the bone, and numerous pulmonary emboli (

history of Factor V Leiden, on coumadin with supratherapeutic INR this evening)

, who presented initially to [facility] one week ago with 

complaints of weakness, fatigue, and dyspnea.  Since his admission, PPM implant 

has been performed, and as of today, the patient is doing well.  There was some 

reported issues with bleeding, and both coumadin and heparin were held with 

resumption of the coumadin therapy today.  





At present, the patient is doing well.  No active cardiovascular complaints.





Plan:


(1)  Would resume therapy on coumadin for history of atrial fibrillation


(2)  Continue therapy on ASA given  history of CAD/CABG/PCI


(3)  Statins should continue for history of HLP, and maintain annual assessment 

of cholesterol and LFTs


(4)  Aggressive ambulation given the time spent in bed


 - OT/PT  


(5)  Will continue to follow this patient over his hospital stay








Subjective: 


No voiced cardiovascular complaints





Reviewed/Discussed With: multidisciplinary team


Time Spent With Patient: 


15 minutes





Objective: 





 Vital Signs (8 Hrs)











  Pulse Resp BP Pulse Ox


 


 01/30/17 18:00  92  25 H  90/55 L  100


 


 01/30/17 17:00  85  22 H  101/74  100


 


 01/30/17 16:00  85  20  91/71 L  3 L


 


 01/30/17 15:00  86  26 H  100/51 L  3 L


 


 01/30/17 14:00  84  23 H  101/43 L  100


 


 01/30/17 13:00  96  220 H  95/46 L  4 L


 


 01/30/17 12:00  95  32 H  112/58 L  91 L


 


 01/30/17 11:00  92  15  93/45 L  94








 Intake/Output (24 Hrs)











 01/29/17 01/30/17 01/31/17





 05:59 05:59 05:59


 


Intake Total 3789 3530 817


 


Output Total 2000 2500 1050


 


Balance 1789 1030 -233


 


Intake:   


 


  Oral (ml) 1400 200 


 


  IV Intake (ml) 292  


 


  IV Infused (ml) 2097 3330 817


 


    DOPamine/DEXTROSE 250 ml 236 335 





    @ Titrate IV CONT SAL Rx#   





    :[id number]   


 


    Heparin/Dextrose 500 ml @ 559 160 





    Per Protocol IV CONT SAL   





    Rx#:[id number]   


 


    Ns 500 ml @ 125 mls/hr IV 1302  





    CONT SAL Rx#:[id number]   


 


    Norepinephrine Bitartrate  1060 294





    4 mg In D5w 500 ml @ Per   





    Protocol IV CONT SAL Rx#   





    :[id number]   


 


    Ns 1,000 ml @ 100 mls/hr  1775 523





    IV AD SAL Rx#:[id number]   


 


Output:   


 


  Urine (ml) 2000 2500 1050


 


    Catheter 2000 2500 1050


 


Other:   


 


  Weight 82.6 kg 88.1 kg 


 


  Number of Stools   


 


    Catheter   1











Result Diagrams: 


 01/30/17 05:45





 01/30/17 05:45


Telemetry: 


normal sinus rhythm at 90-95 bpm.





- Physical Exam


Constitutional: WDWN, healthy appearing, no apparent distress


Eyes: PERRL


Ears, Nose, Mouth, Throat: moist mucous membranes


Cardiovascular: regular rate and rhythm, systolic murmur


Peripheral Pulses: 2+: dorsalis-pedis (R), dorsalis-pedis (L)


Respiratory: clear to auscultate bilat, no crackles, no wheezes


Gastrointestinal: normoactive bowel sounds


Skin: no rashes, no edema


Musculoskeletal: no muscular tenderness


Neurologic: AAOx3, CN II-XII grossly intact


Psychiatric: cooperative, interactive, following commands





ICD10 Worksheet


Patient Problems: 


 Problems











Problem Status Diagnosed


 


Coronary artery bypass grafting Active 


 


Hematuria syndrome Active 


 


Nausea and vomiting Active 


 


Non-healing surgical wound Active 


 


Acute combined systolic and diastolic CHF, NYHA class 3 Acute 


 


Chronic Disease Management/Transitional Care Program Acute 


 


Coronary arteriosclerosis Acute 


 


Gastrointestinal bleeding, lower Acute 


 


S/P CABG x 3 Acute

## 2017-01-30 NOTE — PDINTPN
Intensivist Progress Note


Assessment/Plan: 


Assessment:





82 M with known moderate-severe AS, previous CAD/CABG, trifasicular block and 

multiple PE's with factor V Leiden deficiancy on long term anticoagulation 

transferred to [facility] from [place] after witnessed asystolic cardiac arrest. He went 

[place] "AMA" and went to the ED with reports of HR 30's despite normal O2 sats. 

During his NEGRON he developed increasingly long sinus pauses/bradycardic episodes 

that deteriorated into asystole with loss of pulse. Immediate CPR was perfomed 

per ACLS protocol, though he was unable to be intubated) and once ROSC was 

achived (<10 minutes) he had a temporary pacer placed then was transferred to 

[facility]. 


 


* Cardiac arrest - likely related to arrythmia (eg trifasicular block) with 

excellent neuro outcome. No changes. Cath at [facility] 1/26 without significant 

lesions. Permanent pacer placed 1/27


* Hematoma at pacer site. Xa levels have remained at upper normal so continuing 

to reduce dose and remain therapeutic. If bleeding continues may have to hold. 


* Heart block- As above


* Hypotension- Persists. RN reports only able to give 250 ml bolus via PICC (?) 

but SVI delta was >10% so gave additional NS bolus and increased NS drip from 

125 to 150. Change to levophed since rate not an issue and hope to get more 

stable BP. Target SBP >90.  


* PNA? I suspect aspiration since he lacked significant symptoms prior to his 

event, though his CXR did have infiltrates. However, cannot exclude bacterial 

CAP so was on Zosyn (for anaerobes) 1/24-1/29.


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 Resumed coumadin 1/ 28. 


* Hematuria- briefly 1/27, cleared and returned again today. Likely loya 

trauma and should resolve. No evidence of obstruction. 


* HTN- as above


* ABDIAS- Resolved- adequate UOP. 


* Constipation- Resolved with BM 1/27.











Plan: D/C Loya. Wean off NE. Decrease IVF. Probably restart Heparin later 

today or tomorrow if bleeding into hematoma seems to have stopped. May try 

diuresis if BP OK off pressors.








01/30/17 12:47





Objective: 





 Vital Signs











Temp Pulse Resp BP Pulse Ox


 


 37.0 C   95   32 H  112/58 L  91 L


 


 01/30/17 04:00  01/30/17 12:00  01/30/17 12:00  01/30/17 12:00  01/30/17 12:00








 Microbiology











 01/24/17 19:49 Blood Culture - Final





 Blood 


 


 01/24/17 18:25 Blood Culture - Final





 Blood 








 Laboratory Results





 01/30/17 05:45 





 01/30/17 05:45 





 











 01/29/17 01/30/17 01/31/17





 05:59 05:59 05:59


 


Intake Total 3789 3530 


 


Output Total 2000 2500 500


 


Balance 1789 1030 -500








 











PT  18.0 SEC (12.0-15.0)  H  01/30/17  05:45    


 


INR  1.49  (0.83-1.16)  H  01/30/17  05:45    








CT Chest: No PE. Small R.L effusions. Small areas of basilar consolidation. 

Images reviewed. 





Physical Exam





- Physical Exam


General Appearance: alert, no apparent distress


EENT: normal ENT inspection


Neck: full range of motion, normal inspection


Respiratory: chest non-tender, lungs clear, normal breath sounds


Cardiac/Chest: other (Left upper chest with dressed PPM site, ecchymoses/

hematoma down into arm.), No regular rate, rhythm, No edema


Abdomen: normal bowel sounds, non-tender


Skin: normal color, warm/dry


Extremities: normal inspection


Neuro/Psych: alert, normal mood/affect, oriented x 3





ICD10 Worksheet


Patient Problems: 


 Problems











Problem Status Diagnosed


 


Coronary artery bypass grafting Active 


 


Hematuria syndrome Active 


 


Nausea and vomiting Active 


 


Non-healing surgical wound Active 


 


Acute combined systolic and diastolic CHF, NYHA class 3 Acute 


 


Chronic Disease Management/Transitional Care Program Acute 


 


Coronary arteriosclerosis Acute 


 


Gastrointestinal bleeding, lower Acute 


 


S/P CABG x 3 Acute

## 2017-01-31 NOTE — PDCARPN
Cardiology Progress Note


Chief Complaint: 


No complaints. Patient reportedly slept well last night





Assessment/Plan: 


Assessment:


1-31-17


Patient doing well today.  Sitting up in chair, eating breakfast.  No issues 

overnight.  Reported good sleep.  Coumadin was resumed last night (INR was 

subtherapeutic today, as expected).





1-30-17


Patient is an 83 y/o male, well known to [facility] (Dr. ANA MARIA Rushing), with 

history of CAD s/p CABG (3V, 2012), PCI (LIMA graft, 2014), HTN, HLP, aortic 

stenosis (last mean aortic gradient with [facility] was 32 mm Hg), atrial 

fibrillation ([id number] score of 4), CHF (last [place] Echo with EF at 50%), 

CRI, prostate cancer with mets to the bone, and numerous pulmonary emboli (

history of Factor V Leiden, on coumadin with supratherapeutic INR this evening)

, who presented initially to [facility] one week ago with 

complaints of weakness, fatigue, and dyspnea.  Since his admission, PPM implant 

has been performed, and as of today, the patient is doing well.  There was some 

reported issues with bleeding, and both coumadin and heparin were held with 

resumption of the coumadin therapy today.  





At present, the patient is doing well.  No active cardiovascular complaints.





Plan:


(1)  Transfer patient to the PCU today


(2)  Home medications have been reconciled


(3)  Would start low dose Coreg (3.125 mg once per day) and monitor blood 

pressures


(4)  Aggressive OT/PT given the length of stay and time in bed


(5)  Patient will need a follow up echocardiogram in the outpatient setting 

within 1-2 weeks and [facility] consultation 





Subjective: 


No complaints





Reviewed/Discussed With: multidisciplinary team


Time Spent With Patient: 


15 minutes





Objective: 





 Vital Signs (8 Hrs)











  Temp Pulse Resp BP Pulse Ox


 


 01/31/17 09:00   90  20  97/79 L  96


 


 01/31/17 08:00  36.6 C  80  29 H  107/34 L  98


 


 01/31/17 07:00   70  26 H  99/44 L  100


 


 01/31/17 06:00   72  22 H  91/59 L  100


 


 01/31/17 05:00   70  24 H  82/56 L  100


 


 01/31/17 04:00  36.8 C  73  23 H  97/56 L  100


 


 01/31/17 03:00   70  24 H  91/48 L  100


 


 01/31/17 02:00   75  22 H  97/41 L  100








 Intake/Output (24 Hrs)











 01/30/17 01/31/17 02/01/17





 05:59 05:59 05:59


 


Intake Total 3530 2080 


 


Output Total 2500 1450 


 


Balance 1030 630 


 


Intake:   


 


  Oral (ml) 200 120 


 


  IV Infused (ml) 3330 1960 


 


    DOPamine/DEXTROSE 250 ml 335  





    @ Titrate IV CONT SAL Rx#   





    :[id number]   


 


    Heparin/Dextrose 500 ml @ 160  





    Per Protocol IV CONT SAL   





    Rx#:[id number]   


 


    Norepinephrine Bitartrate 1060 294 





    4 mg In D5w 500 ml @ Per   





    Protocol IV CONT SAL Rx#   





    :[id number]   


 


    Ns 1,000 ml @ 100 mls/hr 1775 1666 





    IV AD SAL Rx#:[id number]   


 


Output:   


 


  Urine (ml) 2500 1450 


 


    Catheter 2500 1050 


 


    Urinal  400 


 


Other:   


 


  Weight 88.1 kg 86.4 kg 


 


  Number of Voids   


 


    Urinal  1 


 


  Number of Stools   


 


    Catheter  1 


 


    Urinal  0 











Result Diagrams: 


 01/31/17 03:20





 01/31/17 03:20


Telemetry: 


normal sinus with BBB pattern (suspect pacing without spikes to clarify)








- Physical Exam


Constitutional: WDWN, healthy appearing


Eyes: PERRL


Ears, Nose, Mouth, Throat: moist mucous membranes


Cardiovascular: regular rate and rhythm, systolic murmur (III/VI ELIUD), No 

jugular vein distention


Peripheral Pulses: 2+: dorsalis-pedis (R), dorsalis-pedis (L)


Respiratory: reduced air movement, dullness to percussion


Gastrointestinal: normoactive bowel sounds


Skin: no edema


Musculoskeletal: no muscular tenderness


Neurologic: AAOx3, CN II-XII grossly intact


Psychiatric: cooperative, interactive, following commands





ICD10 Worksheet


Patient Problems: 


 Problems











Problem Status Diagnosed


 


Coronary artery bypass grafting Active 


 


Hematuria syndrome Active 


 


Nausea and vomiting Active 


 


Non-healing surgical wound Active 


 


Acute combined systolic and diastolic CHF, NYHA class 3 Acute 


 


Chronic Disease Management/Transitional Care Program Acute 


 


Coronary arteriosclerosis Acute 


 


Gastrointestinal bleeding, lower Acute 


 


S/P CABG x 3 Acute

## 2017-02-01 NOTE — PDCARPN
Cardiology Progress Note


Chief Complaint: 


no complaints today





Assessment/Plan: 


Assessment:


2-1-17


Patient doing well.  No cardiovascular complaints.  Patient was moved from the 

ICU to the PCU today.  Some concerns on the part of the patient about lower 

extremity swelling that has been noted.  Weight was up 6 kg yesterday, but down 

4 kg today (according to recorded weights).  INR is still subtherapeutic.  No 

complaints of pacer pocket discomfort.





1-31-17


Patient doing well today.  Sitting up in chair, eating breakfast.  No issues 

overnight.  Reported good sleep.  Coumadin was resumed last night (INR was 

subtherapeutic today, as expected).





1-30-17


Patient is an 81 y/o male, well known to [facility] (Dr. ANA MARIA Rushing), with 

history of CAD s/p CABG (3V, 2012), PCI (LIMA graft, 2014), HTN, HLP, aortic 

stenosis (last mean aortic gradient with [facility] was 32 mm Hg), atrial 

fibrillation ([id number] score of 4), CHF (last [place] Echo with EF at 50%), 

CRI, prostate cancer with mets to the bone, and numerous pulmonary emboli (

history of Factor V Leiden, on coumadin with supratherapeutic INR this evening)

, who presented initially to [facility] one week ago with 

complaints of weakness, fatigue, and dyspnea.  Since his admission, PPM implant 

has been performed, and as of today, the patient is doing well.  There was some 

reported issues with bleeding, and both coumadin and heparin were held with 

resumption of the coumadin therapy today.  





At present, the patient is doing well.  No active cardiovascular complaints.





Plan:


(1)  Continue coumadin with INR reassessment tomorrow


(2)  Would continue Coreg as at present


(3)  OT/PT to assist with determination of placement of the patient (? rehab)


(4)  Will continue to follow patient








Subjective: 


No cardiovascular complaints





Reviewed/Discussed With: family


Time Spent With Patient: 


15 minutes





Objective: 





 Vital Signs (8 Hrs)











  Temp Pulse Resp BP Pulse Ox


 


 02/01/17 15:50  36.4 C  85  20  150/52 H  95


 


 02/01/17 11:51  36.4 C  73  22 H  104/52 L  100


 


 02/01/17 09:34   75   








 Intake/Output (24 Hrs)











 01/31/17 02/01/17 02/02/17





 05:59 05:59 05:59


 


Intake Total 2080 1327 


 


Output Total 1450 400 180


 


Balance 630 927 -180


 


Intake:   


 


  Oral (ml) 120 900 


 


  IV Intake (ml)  427 


 


  IV Infused (ml) 1960  


 


    Norepinephrine Bitartrate 294  





    4 mg In D5w 500 ml @ Per   





    Protocol IV CONT SAL Rx#   





    :[id number]   


 


    Ns 1,000 ml @ 100 mls/hr 1666  





    IV AD SAL Rx#:[id number]   


 


Output:   


 


  Urine (ml) 1450 400 180


 


    Catheter 1050  


 


    Urinal 400  180


 


    Toilet  400 


 


Other:   


 


  Weight 86.4 kg  


 


  Number of Voids   


 


    Urinal 1 2 


 


    Toilet  1 1


 


  Number of Stools   


 


    Catheter 1  


 


    Urinal 0  


 


    Toilet  1 











Result Diagrams: 


 01/31/17 03:20





 01/31/17 03:20


Telemetry: 


ventricular paced rhythm with underlying atrial fibrillation








- Physical Exam


Constitutional: WDWN, healthy appearing, no apparent distress


Eyes: PERRL


Ears, Nose, Mouth, Throat: moist mucous membranes


Cardiovascular: regular rate and rhythm, systolic murmur


Peripheral Pulses: 2+: dorsalis-pedis (R), dorsalis-pedis (L)


Respiratory: clear to auscultate bilat, no crackles, no wheezes


Gastrointestinal: normoactive bowel sounds


Skin: no rashes, no edema


Neurologic: AAOx3, CN II-XII grossly intact


Psychiatric: cooperative, interactive, following commands





ICD10 Worksheet


Patient Problems: 


 Problems











Problem Status Diagnosed


 


Coronary artery bypass grafting Active 


 


Hematuria syndrome Active 


 


Nausea and vomiting Active 


 


Non-healing surgical wound Active 


 


Acute combined systolic and diastolic CHF, NYHA class 3 Acute 


 


Chronic Disease Management/Transitional Care Program Acute 


 


Coronary arteriosclerosis Acute 


 


Gastrointestinal bleeding, lower Acute 


 


S/P CABG x 3 Acute

## 2017-02-02 NOTE — PDCARPN
Cardiology Progress Note


Chief Complaint: 


Patient doing well today, sitting up in chair





Assessment/Plan: 


Assessment:


2-2-17


Patient doing well today.  No voiced cardiovascular complaints.  Patient 

sitting up in chair eating breakfast.  No issues overnight.  Fair sleep.  Blood 

pressures today were controlled.  Single doses of Coreg (3.125 mg PO QHS) have 

been tolerated with good blood pressure control noted.  





2-1-17


Patient doing well.  No cardiovascular complaints.  Patient was moved from the 

ICU to the PCU today.  Some concerns on the part of the patient about lower 

extremity swelling that has been noted.  Weight was up 6 kg yesterday, but down 

4 kg today (according to recorded weights).  INR is still subtherapeutic.  No 

complaints of pacer pocket discomfort.





1-31-17


Patient doing well today.  Sitting up in chair, eating breakfast.  No issues 

overnight.  Reported good sleep.  Coumadin was resumed last night (INR was 

subtherapeutic today, as expected).





1-30-17


Patient is an 83 y/o male, well known to [facility] (Dr. ANA MARIA Ruhsing), with 

history of CAD s/p CABG (3V, 2012), PCI (LIMA graft, 2014), HTN, HLP, aortic 

stenosis (last mean aortic gradient with [facility] was 32 mm Hg), atrial 

fibrillation ([id number] score of 4), CHF (last [place] Echo with EF at 50%), 

CRI, prostate cancer with mets to the bone, and numerous pulmonary emboli (

history of Factor V Leiden, on coumadin with supratherapeutic INR this evening)

, who presented initially to [facility] one week ago with 

complaints of weakness, fatigue, and dyspnea.  Since his admission, PPM implant 

has been performed, and as of today, the patient is doing well.  There was some 

reported issues with bleeding, and both coumadin and heparin were held with 

resumption of the coumadin therapy today.  





At present, the patient is doing well.  No active cardiovascular complaints.





Plan:


(1)  Coumadin should continue, would consider uptitration of the dose to get 

patient therapeutic


(2)  Coreg now 3.125 mg PO twice per day


(3)  OT/PT with desire for patient to go to SNF at the time of discharge


(4)  Aggressive ambulation while in house


(5)  Would remove dressing to the pacer site and inspect today/tomorrow to 

ensure that there are no further issues with the implant








Subjective: 


No cardiovascular complaints today





Time Spent With Patient: 


15 minutes





Objective: 





 Vital Signs (8 Hrs)











  Temp Pulse Resp BP Pulse Ox


 


 02/02/17 08:00  36.7 C  67  22 H  112/58 L  100


 


 02/02/17 04:00  36.5 C  69  18  118/37 L  100








 Intake/Output (24 Hrs)











 02/01/17 02/02/17 02/03/17





 05:59 05:59 05:59


 


Intake Total 1327 400 


 


Output Total 400 730 


 


Balance 927 -330 


 


Intake:   


 


  Oral (ml) 900 400 


 


  IV Intake (ml) 427  


 


Output:   


 


  Urine (ml) 400 730 


 


    Urinal  730 


 


    Toilet 400  


 


Other:   


 


  Number of Voids   


 


    Urinal 2  


 


    Toilet 1 1 


 


  Number of Stools   


 


    Toilet 1  











Result Diagrams: 


 01/31/17 03:20





 01/31/17 03:20


Telemetry: 


Ventricular paced rhythm








- Physical Exam


Constitutional: WDWN, healthy appearing, no apparent distress


Eyes: PERRL


Ears, Nose, Mouth, Throat: moist mucous membranes


Cardiovascular: regular rate and rhythm, systolic murmur


Peripheral Pulses: 2+: dorsalis-pedis (R), dorsalis-pedis (L)


Respiratory: clear to auscultate bilat, reduced air movement


Gastrointestinal: normoactive bowel sounds


Skin: no rashes, no edema


Musculoskeletal: no muscular tenderness


Neurologic: AAOx3, CN II-XII grossly intact


Psychiatric: cooperative, interactive, following commands





ICD10 Worksheet


Patient Problems: 


 Problems











Problem Status Diagnosed


 


Coronary artery bypass grafting Active 


 


Hematuria syndrome Active 


 


Nausea and vomiting Active 


 


Non-healing surgical wound Active 


 


Acute combined systolic and diastolic CHF, NYHA class 3 Acute 


 


Chronic Disease Management/Transitional Care Program Acute 


 


Coronary arteriosclerosis Acute 


 


Gastrointestinal bleeding, lower Acute 


 


S/P CABG x 3 Acute

## 2017-02-03 NOTE — PDDCSUM
Discharge Summary


Discharge Summary: 


ADMISSION


   1-23-17





DISCHARGE


   2-3-17





ADMISSION DIAGNOSIS


   (1)  Syncope


   (2)  Sudden cardiac death





DIAGNOSES


   (1)  CAD s/p CABG (3V, 2012)


   (2)  PCI of LIMA graft (2014)


   (3)  HYPERTENSION


   (4)  HYPERLIPIDEMIA


   (5)  PROSTATE CANCER WITH METS TO BONE


   (6)  CONGESTIVE HEART FAILURE (SYSTOLIC) WITH EF OF 50%


   (7)  ATRIAL FIBRILLATION ([id number] SCORE OF 4)


      ON COUMADIN


   (8)  AORTIC STENOSIS


   (9)  LIKELY ASPIRATION PNEUMONIA 


   (10)  FRACTURE RIBS (FROM CPR)


   (11)  FACTOR V LEIDEN DEFICIENCY (WITH PULMONARY EMBOLI HISTORY)


   (12)  CHRONIC RENAL INSUFFICIENCY





CONSULTATION   


   (1)  CARDIOLOGY


   (2)  ELECTROPHYSIOLOGY


   (3)  PULM/CRIT CARE


   (4)  INFECTIOUS DISEASE





PROCEDURES


   (1)  CXR


   (2)  ICU MONITORING


   (3)  PERMANENT PACER IMPLANTATION


   (4)  ECHOCARDIOGRAM (1-24-17 AND 1-27-17)





SYNOPSIS OF HOSPITAL STAY


   On 1-23-17, the patient was seen by [facility] ER with malaise and 

shortness of breath.  While in the ER, the patient had a cardiovascular arrest.

  CPR and temp transvenous PPM was placed, and the patient was transferred to 

[facility].  Overnight, no issues were noted, but supratherapeutic INR as well as 

fracture ribs slowed the ability to place PPM.  On 1-27-17 a PPM was placed, 

and a moderate haematoma was noted which led to hold on coumadin/heparin.  Over 

the next week, no further bleeding to the pacer site was noted.  Coumadin was 

resumed, but INR was slow in reaching therapeutic levels.  ID consultation for 

concerns about pneumonia (prior to the PPM placement) with belief that signs 

and symptoms were more "viral" than "bacterial".  





Today, patient doing well.  Occupational therapy and Physical therapy with 

recommendations for SNF placement, which became available today.





Patient should be scheduled for outpatient follow up with cardiology in 1-2 

weeks (Dr. ANA MARIA Rushing)


Pacer interrogation (on site) to be scheduled for next week


Subtherapeutic INR with Lovenox bridge over weekend and INR reassessment next 

week





Newly added medication 


   COREG (3.125 mg twice per day)





All prior home medications to continue





Daily weights should be undertaken given concerns about CHF exacerbation (

however, improvement in LVEF noted between initial echocardiogram and follow up 

echocardiogram).

## 2017-02-03 NOTE — PDIAF
- Diagnosis


Diagnosis: Likely aspiration pneumonia, complete heart block s/p PPM, HTN, 

atrial fib


Code Status: Full Code





- Medication Management


Discharge Medications: 


 Medications to Continue on Transfer





Tamsulosin HCl [Flomax 0.4 MG (*)] 0.4 mg PO DAILY 03/13/12 [Last Taken 01/23/17

]


celeCOXIB [Celebrex (*)] 100 mg PO DAILY 03/13/12 [Last Taken 01/23/17]


Bicalutamide [Casodex] 50 mg PO DAILY 12/30/13 [Last Taken 01/23/17]


Levothyroxine [Synthroid 150 mcg (*)] 150 mcg PO DAILY06 12/30/13 [Last Taken 01 /23/17]


Acyclovir [Zovirax 400 mg (*)] 400 mg PO BID PRN 01/24/17 [Last Taken Unknown]


Dicyclomine [Bentyl 10 MG (*)] 10 mg PO DAILY 01/24/17 [Last Taken 01/23/17]


Digoxin [Lanoxin 250 mcg (RX)] 125 mcg PO DAILY10 01/24/17 [Last Taken 01/23/17]


Fluticasone Nasal [Flonase Nasal Spray] 1 sprays EACHNARE DAILY 01/24/17 [Last 

Taken Unknown]


Herbals/Supplements -Info Only 1 ea PO DAILY 01/24/17 [Last Taken Unknown]


Nitroglycerin [Nitrostat 0.4 mg (*)] 0.4 mg SL Q5M PRN 01/24/17 [Last Taken 

Unknown]


Pantoprazole Sodium [Protonix 40mg (*)] 40 mg PO DAILY 01/24/17 [Last Taken 01/ 23/17]


Rosuvastatin Calcium [Crestor 5mg] 5 mg PO DAILY 01/24/17 [Last Taken 01/23/17]


Spironolactone [Aldactone 25 MG (*)] 25 mg PO DAILY 01/24/17 [Last Taken 01/23/ 17]


Temazepam [Restoril 15 MG (*)] 15 mg PO HSPRN PRN 01/24/17 [Last Taken Unknown]


Warfarin Sodium [Coumadin 2.5MG (*)] 2.5 mg PO SUTUTHSA 01/24/17 [Last Taken 01/ 22/17]


Warfarin Sodium [Coumadin 5MG (*)] 5 mg PO MWF 01/24/17 [Last Taken 01/23/17]


Aspirin [Aspirin 81mg (*)] 81 mg PO DAILY #0 tab.chew 02/03/17 [Last Taken 

Unknown]


Carvedilol [Coreg (*)] 3.125 mg PO BIDMEAL #0 tab 02/03/17 [Last Taken Unknown]








Discharge Medications: Refer to the Discharge Home Medication list for PRN 

reason.


PICC Care - Routine: N/A





- Orders


Services needed: Registered Nurse, Certified Nursing Aide, Physical Therapy, 

Occupational Therapy


Oxygen: nocturnal oxygen


Diet Recommendation: no restrictions on diet


Diet Texture: Regular Texture Diet


Weigh Patient: daily





- Labs/Radiology


BMP Date: 02/10/17


CBC Date: 02/10/17


PT/INR Date: 02/07/17





- Follow Up Care


Current Providers and Referrals: 


Patient,NotPresent [Primary Care Provider] - 


Federico Rushing MD [Medical Doctor] - follow up in 2 weeks

## 2017-02-03 NOTE — PDIAF
- Diagnosis


Diagnosis: Likely aspiration pneumonia, complete heart block s/p PPM, HTN, 

atrial fib


Code Status: Full Code





- Medication Management


Discharge Medications: 


 Medications to Continue on Transfer





Tamsulosin HCl [Flomax 0.4 MG (*)] 0.4 mg PO DAILY 03/13/12 [Last Taken 01/23/17

]


celeCOXIB [Celebrex (*)] 100 mg PO DAILY 03/13/12 [Last Taken 01/23/17]


Bicalutamide [Casodex] 50 mg PO DAILY 12/30/13 [Last Taken 01/23/17]


Levothyroxine [Synthroid 150 mcg (*)] 150 mcg PO DAILY06 12/30/13 [Last Taken 01 /23/17]


Acyclovir [Zovirax 400 mg (*)] 400 mg PO BID PRN 01/24/17 [Last Taken Unknown]


Dicyclomine [Bentyl 10 MG (*)] 10 mg PO DAILY 01/24/17 [Last Taken 01/23/17]


Digoxin [Lanoxin 250 mcg (RX)] 125 mcg PO DAILY10 01/24/17 [Last Taken 01/23/17]


Fluticasone Nasal [Flonase Nasal Spray] 1 sprays EACHNARE DAILY 01/24/17 [Last 

Taken Unknown]


Herbals/Supplements -Info Only 1 ea PO DAILY 01/24/17 [Last Taken Unknown]


Nitroglycerin [Nitrostat 0.4 mg (*)] 0.4 mg SL Q5M PRN 01/24/17 [Last Taken 

Unknown]


Pantoprazole Sodium [Protonix 40mg (*)] 40 mg PO DAILY 01/24/17 [Last Taken 01/ 23/17]


Rosuvastatin Calcium [Crestor 5mg] 5 mg PO DAILY 01/24/17 [Last Taken 01/23/17]


Spironolactone [Aldactone 25 MG (*)] 25 mg PO DAILY 01/24/17 [Last Taken 01/23/ 17]


Temazepam [Restoril 15 MG (*)] 15 mg PO HSPRN PRN 01/24/17 [Last Taken Unknown]


Warfarin Sodium [Coumadin 2.5MG (*)] 2.5 mg PO SUTUTHSA 01/24/17 [Last Taken 01/ 22/17]


Warfarin Sodium [Coumadin 5MG (*)] 5 mg PO MWF 01/24/17 [Last Taken 01/23/17]


Aspirin [Aspirin 81mg (*)] 81 mg PO DAILY #0 tab.chew 02/03/17 [Last Taken 

Unknown]


Carvedilol [Coreg (*)] 3.125 mg PO BIDMEAL #0 tab 02/03/17 [Last Taken Unknown]


Enoxaparin [Lovenox 80 MG (*)] 80 mg SQ BID #5 syr 02/03/17 [Last Taken Unknown]








Discharge Medications: Refer to the Discharge Home Medication list for PRN 

reason.


PICC Care - Routine: N/A





- Orders


Services needed: Registered Nurse, Certified Nursing Aide, Physical Therapy, 

Occupational Therapy


Oxygen: nocturnal oxygen


Diet Recommendation: no restrictions on diet


Diet Texture: Regular Texture Diet


Weigh Patient: daily





- Labs/Radiology


BMP Date: 02/10/17


CBC Date: 02/10/17


PT/INR Date: 02/07/17





- Follow Up Care


Current Providers and Referrals: 


Patient,NotPresent [Primary Care Provider] - 


Federico Rushing MD [Medical Doctor] - follow up in 2 weeks

## 2017-02-07 NOTE — CT
CT Pulmonary Angiogram



History: Worsening shortness of breath, lower extremity swelling, recent pacemaker placement, cardiac
 arrest with CPR and multiple known rib fractures. History of sarcoma. History of known osseous metas
tases.



Comparison: CT angiogram chest January 29, 2017, CT chest June 17, 2013. CT abdomen and pelvis Februa
ry 10, 2014. Bone scan June 11, 2015.



Technique: Axial contrast-enhanced images were obtained through the chest following the uneventful in
travenous administration of 85 mL Isovue-370. Creatinine is 0.9.  Multiplanar reformations were perfo
rmed through the pulmonary arteries. Dose reduction techniques were utilized.



Findings: This is a good quality study with visualization of the vessels to the subsegmental level. T
here is no pulmonary embolus. Numerous groundglass nodules are noted bilaterally, decreased in densit
y since the previous study. A subpleural anterior right upper lobe nodule (series 3 image 77) is unch
anged since 2013. Left lower lobectomy changes are stable. A small loculated right pleural effusion h
as not significantly changed. Pleural thickening and medial left upper lobe atelectasis are unchanged
. Dual lead left subclavian pacemaker is stable. Moderate cardiomegaly is stable. The aorta is normal
 caliber with moderate atherosclerosis. CABG changes are noted.. No pathologically enlarged lymph nod
es are identified. The bones are stable, including stable minimally displaced acute anterior right th
ird through seventh rib fractures. Numerous sclerotic osseous lesions are present, including in the p
osterior right second rib, unchanged.



There are numerous soft tissue density structures in the left renal hilum in the area of fluid densit
y cysts on the comparison CT from 2014, most likely related to hemorrhagic or proteinaceous cysts, bu
t incompletely characterized. Colonic diverticulosis is present without evidence of diverticulitis.



Impression:

1. No visible pulmonary embolus.

2. Stable small loculated right pleural effusion.

3. Soft tissue density structures in the left renal hilum in areas of previously noted cysts, most li
liane related to benign interval hemorrhage. Ultrasound is recommended for further evaluation.

4. Osseous metastases, most prominent in the posterior right second rib. This appears new since the c
omparison bone scan from 2015.

5. Additional findings as above.



Findings discussed with Dr. Kenneth Prado, on February 7, 2017 at 1345 hours.

## 2017-03-24 NOTE — EDPHY
H & P


Time Seen by Provider: 03/24/17 21:02


HPI/ROS: 





CHIEF COMPLAINT:  Chest pain





HISTORY OF PRESENT ILLNESS:  Patient is an 82-year-old female with multiple 

medical problems including cardiac arrest earlier this year.  He presents 

emergency department after having "3 shots" of chest pain. He describes this is 

across the front of his chest.  It lasted less than 10 seconds in nature, but 

there are 3 distinct episodes.  He denies any associated symptoms. He had no 

diaphoresis, nausea or vomiting. The patient states at baseline he has 

shortness of breath.  His dyspnea on exertion has worsened over the past week.  

He has no leg pain or swelling. He denies fevers or chills. No cough.





REVIEW OF SYSTEMS:  


My complete review of systems is negative except as mentioned in the HPI.


Past Medical/Surgical History: 





Includes cardiac arrest, coronary artery disease, hypertension, hyperlipidemia, 

prostate cancer, CHF, atrial fibrillation, aortic stenosis, aspiration pneumonia

, factor 5 Leiden, renal insufficiency atrial, PE





Past surgical history:  Includes angioplasty, coronary artery bypass, coronary 

stent





Social history:  The patient does not smoke.


Smoking Status: Never smoked


Physical Exam: 





Vitals noted


GENERAL:  Well-appearing, in no acute distress, alert.


HEENT:  Eyes normal to inspection, normal pharynx, no signs of dehydration.


NECK:  No thyromegaly, no lymphadenopathy, supple.


RESPIRATORY:  Clear to auscultation bilaterally, no rales, rhonchi or wheezing.


CVS:  Irregular rhythm with normal rate, no rubs, murmurs, or gallops.


ABDOMEN:  Soft, nontender, nondistended, no organomegaly.


BACK:  Normal to inspection, no CVA tenderness.


SKIN:  Normal color, no rash, warm, dry.  No pallor.


EXTREMITIES:  No pedal edema, no calf tenderness, no Homans sign or cords, no 

joint swelling.


NEURO/PSYCH:  Alert and oriented, normal mood and affect, normal motor sensory 

exam. 


Constitutional: 


 Initial Vital Signs











O2 Sat (%)  98   03/24/17 20:49








 











O2 Delivery Mode               Nasal Cannula


 


O2 (L/minute)                  2














Allergies/Adverse Reactions: 


 





daptomycin Allergy (Verified 03/24/17 21:30)


 








Home Medications: 














 Medication  Instructions  Recorded


 


Tamsulosin HCl [Flomax 0.4 MG (*)] 0.4 mg PO DAILY 03/13/12


 


celeCOXIB [Celebrex (*)] 100 mg PO DAILY 03/13/12


 


Bicalutamide [Casodex] 50 mg PO DAILY 12/30/13


 


Levothyroxine [Synthroid 150 mcg 150 mcg PO DAILY06 12/30/13





(*)]  


 


Acyclovir [Zovirax 400 mg (*)] 400 mg PO BID PRN 01/24/17


 


Dicyclomine [Bentyl 10 MG (*)] 10 mg PO DAILY 01/24/17


 


Digoxin [Lanoxin 250 mcg (RX)] 125 mcg PO DAILY10 01/24/17


 


Fluticasone Nasal [Flonase Nasal 1 sprays EACHNARE DAILY 01/24/17





Spray]  


 


Herbals/Supplements -Info Only 1 ea PO DAILY 01/24/17


 


Nitroglycerin [Nitrostat 0.4 mg 0.4 mg SL Q5M PRN 01/24/17





(*)]  


 


Pantoprazole Sodium [Protonix 40mg 40 mg PO DAILY 01/24/17





(*)]  


 


Rosuvastatin Calcium [Crestor 5mg] 5 mg PO DAILY 01/24/17


 


Spironolactone [Aldactone 25 MG 25 mg PO DAILY 01/24/17





(*)]  


 


Temazepam [Restoril 15 MG (*)] 15 mg PO HSPRN PRN 01/24/17


 


Warfarin Sodium [Coumadin 2.5MG 2.5 mg PO SUTUTHSA 01/24/17





(*)]  


 


Warfarin Sodium [Coumadin 5MG (*)] 5 mg PO MWF 01/24/17


 


Aspirin [Aspirin 81mg (*)] 81 mg PO DAILY #0 tab.chew 02/03/17


 


Carvedilol [Coreg (*)] 3.125 mg PO BIDMEAL #0 tab 02/03/17


 


Enoxaparin [Lovenox 80 MG (*)] 80 mg SQ BID #5 syr 02/03/17














Medical Decision Making





- Diagnostics


EKG Interpretation: 





Atrial fibrillation at 64.  Right bundle branch block and left anterior 

fascicular block.  Patient is intermittently paced.


ED Course/Re-evaluation: 





In the emergency department I discussed possible etiologies with the patient.  

I answered his questions.  IV was placed.  Laboratory studies, EKG and chest x-

ray were ordered.  Patient was given aspirin orally.





Patient was noted to have an elevated troponin.  I compared this with his 

previous levels.  It has been more significantly elevated in the past.  Patient'

s creatinine is mildly elevated.  CBC shows mild anemia.





I discussed the results with the patient.  Plan is for admission for further 

observation and treatment.  I called the patient's pacer company to interrogate 

his pacemaker.  Discussed the case with the hospitalist service for admission.  

They accepted admission.


Differential Diagnosis: 





My differential includes but is not limited to ACS, acute MI, dysrhythmia, 

atrial fibrillation, pneumonia, pneumothorax, pulmonary embolus, empyema, 

dissection, aneurysm





- Data Points


Laboratory Results: 


 Laboratory Results





 03/24/17 21:00 





 03/24/17 21:00 





 











  03/24/17 03/24/17





  21:00 21:00


 


WBC    5.71 10^3/uL 10^3/uL





    (3.80-9.50) 


 


RBC    3.95 10^6/uL L 10^6/uL





    (4.40-6.38) 


 


Hgb    12.9 g/dL L g/dL





    (13.7-17.5) 


 


Hct    38.8 % L %





    (40.0-51.0) 


 


MCV    98.2 fL fL





    (81.5-99.8) 


 


MCH    32.7 pg pg





    (27.9-34.1) 


 


MCHC    33.2 g/dL g/dL





    (32.4-36.7) 


 


RDW    14.3 % %





    (11.5-15.2) 


 


Plt Count    154 10^3/uL 10^3/uL





    (150-400) 


 


MPV    10.2 fL fL





    (8.7-11.7) 


 


Neut % (Auto)    Not Reported 





   


 


Lymph % (Auto)    Not Reported 





   


 


Mono % (Auto)    Not Reported 





   


 


Eos % (Auto)    Not Reported 





   


 


Baso % (Auto)    Not Reported 





   


 


Nucleat RBC Rel Count    0.0 % %





    (0.0-0.2) 


 


Absolute Neuts (auto)    Not Reported 





   


 


Absolute Lymphs (auto)    Not Reported 





   


 


Absolute Monos (auto)    Not Reported 





   


 


Absolute Eos (auto)    Not Reported 





   


 


Absolute Basos (auto)    Not Reported 





   


 


Absolute Nucleated RBC    0.00 10^3/uL 10^3/uL





    (0-0.01) 


 


Immature Gran %    Not Reported 





   


 


Seg Neutrophils %    57 % %





   


 


Band Neutrophils %    2 % %





   


 


Lymphocytes %    15 % %





   


 


Monocytes %    18 % %





   


 


Eosinophils %    7 % %





   


 


Basophils %    1 % %





   


 


Immature Gran #    Not Reported 





   


 


Absolute Seg Neuts    3.25 10^/uL 10^/uL





    (1.70-6.50) 


 


Absolute Band Neuts    0.11 10^3/uL 10^3/uL





    (0.00-0.70) 


 


Absolute Lymphocytes    0.86 10^3/uL L 10^3/uL





    (1.00-3.00) 


 


Absolute Monocytes    1.03 10^3/uL H 10^3/uL





    (0.30-0.80) 


 


Absolute Eosinophils    0.40 10^3/uL 10^3/uL





    (0.03-0.40) 


 


Absolute Basophils    0.06 10^3/uL 10^3/uL





    (0.02-0.10) 


 


Atypical Lymphocytes    2+  H 





   


 


Platelet Estimate    ADEQUATE 





    (ADEQ) 


 


Microcytic Cells    1+  H 





   


 


Tear Drop Cells    2+  H 





   


 


Elliptocytes    1+  H 





   


 


Schistocytes    1+  H 





   


 


Smear Review By    Pending 





   


 


Sodium  137 mEq/L mEq/L  





   (134-144)  


 


Potassium  5.0 mEq/L mEq/L  





   (3.5-5.2)  


 


Chloride  98 mEq/L mEq/L  





   ()  


 


Carbon Dioxide  28 mEq/l mEq/l  





   (22-31)  


 


Anion Gap  11 mEq/L mEq/L  





   (8-16)  


 


BUN  31 mg/dL H mg/dL  





   (7-23)  


 


Creatinine  1.5 mg/dL H mg/dL  





   (0.7-1.3)  


 


Estimated GFR  45   





   


 


Glucose  115 mg/dL H mg/dL  





   ()  


 


Calcium  9.8 mg/dL mg/dL  





   (8.5-10.4)  


 


Troponin I  0.096 ng/mL H ng/mL  





   (0-0.034)  











Medications Given: 


 








Discontinued Medications





Aspirin (Aspirin)  324 mg PO EDNOW ONE


   Stop: 03/24/17 22:03


   Last Admin: 03/24/17 22:19 Dose:  324 mg








Departure





- Departure


Disposition: [facility]


Clinical Impression: 


 Elevated troponin





Chest pain


Qualifiers:


 Chest pain type: precordial pain Qualified Code(s): R07.2 - Precordial pain





Condition: Fair


Referrals: 


Michael Prado MD [Primary Care Provider] - As per Instructions

## 2017-03-25 NOTE — GDS
[f rep st]



                                                             DISCHARGE SUMMARY





DISCHARGE DIAGNOSES:  

1.  Resolved chest pain.  Unclear etiology.  Likely atypical.

2.  Resolved bradycardia.

3.  Resolved dizziness, in the setting of bradycardia and hypotension.  

4.  History of deep vein thrombosis and pulmonary embolism.

5.  Hyperlipidemia.

6.  History of metastatic prostate cancer.



CONSULTANTS:  Corby Gomez MD, [facility].



HOSPITAL COURSE AND STAY:  

1.  Dizziness and bradycardia:  The patient was admitted to the hospital after he was found to be br
adycardic and dizzy, with a systolic blood pressure in the 90s.  This was also associated with some 
chest pain.  Serial troponins were done that were relatively flat.  He was seen by Cardiology, who d
id not think his pain was cardiac in etiology, given the flat troponins, and recent negative cardiac
 catheterization.  A CT angio of the chest was done to rule out PE that was negative for PE.  His ho
me dose of carvedilol 6.25 mg was held.  On the afternoon of 03/25/2017, the patient tells me that samantha crump feels much better and would like to go home.  His blood pressure has increased to 101/60.  We will
 plan on sending him home on a lower dose of carvedilol, with close followup with his primary care leonardo everett and cardiologist.



DISCHARGE PHYSICAL EXAMINATION:  VITAL SIGNS:  Blood pressure 101/60, pulse 65, respiratory rate 18,
 O2 saturation 92% on 2 L.  Temperature afebrile.  GENERAL:  No acute distress.  HEART:  S1, S2.  ALBER
NGS:  Clear.  ABDOMEN:  Soft.  EXTREMITIES:  No edema.



LABORATORY DATA AND STUDIES:  CT angio of the chest on 03/25/2017, refer to report.



DISCHARGE MEDICATIONS:  Please refer to discharge medication reconciliation in [facility] for full det
ails.  Below is a preliminary list. 



New medications on hospital discharge, carvedilol was decreased from 6.25 mg twice daily to 3.125 mg
 twice daily. 



All other home medications were continued as usual home dosages.



DISCHARGE INSTRUCTIONS:  The patient was discharged home, where he is instructed to follow up with h
is primary care provider next week, as well as with Cardiology, as directed.  He will need routine m
onitoring of his INR.  He should seek medical attention if he continues to have dizziness. 



Greater than 30 minutes were spent on the discharge of this patient.





Job #:  [id number]

## 2017-03-25 NOTE — PDGENHP
History and Physical





- Chief Complaint


bradycardia with associated dizziness





- History of Present Illness


Patient is an 82-year-old male with an extensive cardiac history including CAD S

/P CABG and PCI, recent asystolic cardiac arrest S/P BI-V PPM, prostate cancer 

with diffuse osseous metastatic lesions, h/o PE and atrial fibrillation who 

presents to the ED with complaint of chest pain and bradycardia.  Patient 

states this afternoon prior to presentation he experience 3 episodes of short 

moderate intensity chest pain. Pain was located across his chest, lasted less 

than 10 seconds, was nonradiating med associated with dizziness, nausea, 

diaphoresis.  However over the course of the afternoon, patient began to check 

his pulse ox and noted his heart rate to be in the 50 range and occasionally 

dip down into the 40 in 30 range which was associated with generalized weakness 

and dizziness.  Reports that after several minutes the heart rate slowly began 

to increase back into his 65-70 range and his symptoms resolved. Patient's PPM 

is supposed to be set to pace at 65bpm and was last checked about 2 weeks ago. 





On arrival to the ED patient was afebrile, heart rate was never noted to be 

less than 65 and BP was in the low end of normal.  Labs revealed an 

indeterminately elevated troponin at 0.094, mildly elevated creatinine and 

normal CBC.  EKG revealed AFib with underlying RBBB, without obvious ischemic 

changes.  Chest x-ray was unchanged from priors.  Patient was given full dose 

aspirin and admitted to the hospitalist service for further management





History Information





- Allergies/Home Medication List


Allergies/Adverse Reactions: 








daptomycin Allergy (Verified 03/24/17 21:30)


 





Home Medications: 








Tamsulosin HCl [Flomax 0.4 MG (*)] 0.4 mg PO DAILY 03/13/12 [Last Taken 01/23/17

]


celeCOXIB [Celebrex (*)] 100 mg PO DAILY 03/13/12 [Last Taken 01/23/17]


Bicalutamide [Casodex] 50 mg PO DAILY 12/30/13 [Last Taken 01/23/17]


Levothyroxine [Synthroid 150 mcg (*)] 150 mcg PO DAILY06 12/30/13 [Last Taken 01 /23/17]


Acyclovir [Zovirax 400 mg (*)] 400 mg PO BID PRN 01/24/17 [Last Taken Unknown]


Dicyclomine [Bentyl 10 MG (*)] 10 mg PO DAILY 01/24/17 [Last Taken 01/23/17]


Digoxin [Lanoxin 250 mcg (RX)] 125 mcg PO DAILY10 01/24/17 [Last Taken 01/23/17]


Fluticasone Nasal [Flonase Nasal Spray] 1 sprays EACHNARE DAILY 01/24/17 [Last 

Taken Unknown]


Herbals/Supplements -Info Only 1 ea PO DAILY 01/24/17 [Last Taken Unknown]


Nitroglycerin [Nitrostat 0.4 mg (*)] 0.4 mg SL Q5M PRN 01/24/17 [Last Taken 

Unknown]


Pantoprazole Sodium [Protonix 40mg (*)] 40 mg PO DAILY 01/24/17 [Last Taken 01/ 23/17]


Rosuvastatin Calcium [Crestor 5mg] 5 mg PO DAILY 01/24/17 [Last Taken 01/23/17]


Spironolactone [Aldactone 25 MG (*)] 25 mg PO DAILY 01/24/17 [Last Taken 01/23/ 17]


Temazepam [Restoril 15 MG (*)] 15 mg PO HSPRN PRN 01/24/17 [Last Taken Unknown]


Warfarin Sodium [Coumadin 2.5MG (*)] 2.5 mg PO SUTUTHSA 01/24/17 [Last Taken 01/ 22/17]


Warfarin Sodium [Coumadin 5MG (*)] 5 mg PO MWF 01/24/17 [Last Taken 01/23/17]





I have personally reviewed and updated: family history, medical history, social 

history, surgical history





- Past Medical History


atrial fibrillation, coronary artery disease, cancer, DVT, GI bleed, 

hypertension, hyperlipidemia, myocardial infarction, pulmonary embolism


Additional medical history: Asystolic cardiac arrest 1/2017, s/p BiV PPM.  

Coronary artery disease status post CABG.  Lungs sarcoma, status post partial 

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  Factor 5 Leiden 

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





- Surgical History


Reports: angioplasty, coronary bypass surgery, coronary stent





- Family History


Positive for: CAD





- Social History


Smoking Status: Never smoked


Alcohol Use: None


Drug Use: None


Additional social history: Lives with wife, walks independently





Review of Systems


ROS: 10pt was reviewed & negative except for what was stated in HPI & below





Physical Exam














Temp Pulse Resp BP Pulse Ox


 


 36.4 C   65   18   98/54 L  89 L


 


 03/25/17 00:54  03/25/17 00:54  03/25/17 00:54  03/25/17 00:54  03/25/17 00:54




















O2 (L/minute)                  2














Constitutional: no apparent distress, appears nourished, not in pain


Eyes: PERRL, anicteric sclera, EOMI


Ears, Nose, Mouth, Throat: hearing normal, ears appear normal, no oral mucosal 

ulcers, dry mucous membranes


Cardiovascular: regular rate and rhythym, systolic murmur (loud), pulses 

symmetric bilaterally, No JVD, No edema


Peripheral Pulses: 2+: dorsalis-pedis (R), dorsalis-pedis (L)


Respiratory: no respiratory distress, no rales or rhonchi, clear to auscultation


Gastrointestinal: normoactive bowel sounds, soft, non-tender abdomen, no 

palpable masses, No guarding, No rebound, No distension


Genitourinary: no bladder fullness, no bladder tenderness


Skin: warm, normal color, no rashes or abrasions, no fluctuance, no induration, 

No mottled


Musculoskeletal: full muscle strength, no muscle tenderness, normal joint ROM, 

no joint effusions


Neurologic: AAOx3, sensation intact bilaterally, CN II-XII Intact, No weakness, 

No numbness, No facial droop


Psychiatric: interacting appropriately, not anxious, not encephalopathic, 

thought process linear





Lab Data & Imaging Review





 03/24/17 21:00





 03/24/17 21:00














WBC  5.71 10^3/uL (3.80-9.50)   03/24/17  21:00    


 


RBC  3.95 10^6/uL (4.40-6.38)  L  03/24/17  21:00    


 


Hgb  12.9 g/dL (13.7-17.5)  L  03/24/17  21:00    


 


Hct  38.8 % (40.0-51.0)  L  03/24/17  21:00    


 


MCV  98.2 fL (81.5-99.8)   03/24/17  21:00    


 


MCH  32.7 pg (27.9-34.1)   03/24/17  21:00    


 


MCHC  33.2 g/dL (32.4-36.7)   03/24/17  21:00    


 


RDW  14.3 % (11.5-15.2)   03/24/17  21:00    


 


Plt Count  154 10^3/uL (150-400)   03/24/17  21:00    


 


MPV  10.2 fL (8.7-11.7)   03/24/17  21:00    


 


Neut % (Auto)  Not Reported   03/24/17  21:00    


 


Lymph % (Auto)  Not Reported   03/24/17  21:00    


 


Mono % (Auto)  Not Reported   03/24/17  21:00    


 


Eos % (Auto)  Not Reported   03/24/17  21:00    


 


Baso % (Auto)  Not Reported   03/24/17  21:00    


 


Nucleat RBC Rel Count  0.0 % (0.0-0.2)   03/24/17  21:00    


 


Absolute Neuts (auto)  Not Reported   03/24/17  21:00    


 


Absolute Lymphs (auto)  Not Reported   03/24/17  21:00    


 


Absolute Monos (auto)  Not Reported   03/24/17  21:00    


 


Absolute Eos (auto)  Not Reported   03/24/17  21:00    


 


Absolute Basos (auto)  Not Reported   03/24/17  21:00    


 


Absolute Nucleated RBC  0.00 10^3/uL (0-0.01)   03/24/17  21:00    


 


Immature Gran %  Not Reported   03/24/17  21:00    


 


Seg Neutrophils %  57 %  03/24/17  21:00    


 


Band Neutrophils %  2 %  03/24/17  21:00    


 


Lymphocytes %  15 %  03/24/17  21:00    


 


Monocytes %  18 %  03/24/17  21:00    


 


Eosinophils %  7 %  03/24/17  21:00    


 


Basophils %  1 %  03/24/17  21:00    


 


Immature Gran #  Not Reported   03/24/17  21:00    


 


Absolute Seg Neuts  3.25 10^/uL (1.70-6.50)   03/24/17  21:00    


 


Absolute Band Neuts  0.11 10^3/uL (0.00-0.70)   03/24/17  21:00    


 


Absolute Lymphocytes  0.86 10^3/uL (1.00-3.00)  L  03/24/17  21:00    


 


Absolute Monocytes  1.03 10^3/uL (0.30-0.80)  H  03/24/17  21:00    


 


Absolute Eosinophils  0.40 10^3/uL (0.03-0.40)   03/24/17  21:00    


 


Absolute Basophils  0.06 10^3/uL (0.02-0.10)   03/24/17  21:00    


 


Atypical Lymphocytes  2+  H  03/24/17  21:00    


 


Platelet Estimate  ADEQUATE  (ADEQ)   03/24/17  21:00    


 


Microcytic Cells  1+  H  03/24/17  21:00    


 


Tear Drop Cells  2+  H  03/24/17  21:00    


 


Elliptocytes  1+  H  03/24/17  21:00    


 


Schistocytes  1+  H  03/24/17  21:00    


 


PT  25.0 SEC (12.0-15.0)  H  03/24/17  21:00    


 


INR  2.24  (0.83-1.16)  H  03/24/17  21:00    


 


APTT  41.6 SEC (23.0-38.0)  H  03/24/17  21:00    


 


D-Dimer  1.03 ug/mLFEU (0.00-0.50)  H  03/24/17  21:00    


 


Sodium  137 mEq/L (134-144)   03/24/17  21:00    


 


Potassium  5.0 mEq/L (3.5-5.2)   03/24/17  21:00    


 


Chloride  98 mEq/L ()   03/24/17  21:00    


 


Carbon Dioxide  28 mEq/l (22-31)   03/24/17  21:00    


 


Anion Gap  11 mEq/L (8-16)   03/24/17  21:00    


 


BUN  31 mg/dL (7-23)  H  03/24/17  21:00    


 


Creatinine  1.5 mg/dL (0.7-1.3)  H  03/24/17  21:00    


 


Estimated GFR  45   03/24/17  21:00    


 


Glucose  115 mg/dL ()  H  03/24/17  21:00    


 


Calcium  9.8 mg/dL (8.5-10.4)   03/24/17  21:00    


 


Magnesium  2.5 mg/dL (1.6-2.3)  H  03/24/17  21:00    


 


Troponin I  0.096 ng/mL (0-0.034)  H  03/24/17  21:00    


 


Digoxin  1.4 ng/mL (0.8-2.0)   03/24/17  21:00    








Visualized and Interpreted Chest x-ray results: Yes


Chest X-Ray results: other (PPM in place, L basilar scarring)


Visualized and Interpreted EKG results: Yes


EKG additional interpertation: Afib with RBBB and LAFB





Assessment & Plan


Assessment: 


Patient is an 82-year-old male with an extensive cardiac history including 

recent asystolic cardiac arrest who presents to the ED with an episode of chest 

pain and as well as symptomatic bradycardia.  ED evaluation reveals mildly 

elevated troponin.





Plan: 





# chest pain


Patient has an extensive cardiac history and is at high risk for a cardiac 

event. His description of his chest pain is concerning and initial troponin is 

indeterminantly elevated. However, EKG does not show any acute ischemic events, 

is unchanged from previous EKGs. Suspect type 2 NSTEMI of demand ischemia in 

setting of a bradycardic episode/arrhythmia. Will need PPM interrogated, will 

also trend troponins, monitor EKGs/telemetry and continue home cardiac med 

regimen. 





# ? bradycardia, paroxysmal Afib


Patient reports that when he checked is pulse ox at home his HR had varied from 

30-60 bpm, far below his set PPM rate and associated with dizziness. Since 

arrival to the ED, HR has ranged from 60-70bpm. Electrolytes are within normal 

limits. Will interrogate PPM in AM, continue home med regimen. EKG does reveal 

Afib, digoxin and INR are both therapeutic. Will continue home med regimen.





# chronic hypertension


BP low end of normal. Will hold antihypertensives until BP tolerates. 





# h/o DVT/PEs


INR is therapeutic. Do not suspect PE on this presentation, as patient does not 

exhibit respiratory symptoms, tachycardia or tachypnea. Will continue warfarin.





# HLD


Cont statin.





# metastatic prostate cancer


No acute issues, will cont pain meds prn. 





# dispo: admit to inpatient service for likely > 2MN stay





# gen:


NPO


DVT ppx: on coumadin


Full code

## 2017-03-25 NOTE — GCON
[f 
rep st]



                                                                    CONSULTATION





CARDIOLOGY CONSULTATION.



CHIEF COMPLAINT:  Feeling dizzy. 



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 
for [facility] [employer] and up at [place].  He is very familiar and 
comfortable with this place.  He came to the hospital he said because he when 
he was walking around he felt dizzy, some shortness of breath.  He just "wasn't 
feeling well."  He checked his pacemaker, which is supposed to be set at 65, 
and he thought it was going at 30 beats per minute, and that also brought him 
into the hospital. 



He had 3 episodes in the last day or so of chest pain that lasted 10 seconds at 
a time, was a quite severe chest pain across the center of the chest.  He has 
also been complaining of significant shortness of breath recently, which is not 
something he has had in the past.  



He has a history of Factor V Leiden pathology, and he has had 3 pulmonary 
embolisms in the past.  He is on full time Coumadin anticoagulation, and has 
been well anticoagulated.  He has not had any more chest pain since he has been 
in the hospital. 



He has not been walking much, but when he walked he felt really dizzy and short 
of breath.  He has a history of prostate cancer with metastatic disease as a 
question.  They are getting an ongoing evaluation for that.  He has a history 
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 
of the lung years and years ago.  He does not have jaw pain or arm pain.  He 
does not have pain radiating into the arms.  He does not have fever, chills, 
cough.  He has not had hemoptysis.  He has no history of tuberculosis.  No 
history of rheumatic disease, claudication or cerebrovascular disease. 



What did happen to him in January was he had a cardiac arrest at [place].  They 
put an external pacemaker on him and shipped him to [place].  There he had a 
coronary angiogram and was told that his bypasses were working very well, and 
he had no other need for further evaluation on that along those terms.



PAST MEDICAL HISTORY:  Includes the fact that he has had PCI in the past.



CARDIAC RISK FACTORS:  Positive for known coronary artery disease with coronary 
artery bypass grafting, status post stents as well.  He has dyslipidemia.  He 
denies hypertension.  He denies diabetes mellitus.  He denies family history of 
premature coronary disease.  He denies heavy alcohol drinking.  He is not 
smoking.  He is not obese.  The patient has a history of atrial fibrillation.



ALLERGIES:  Daptomycin.



MEDICATIONS:  Flomax, Celebrex, Casodex, levothyroxine, acyclovir, digoxin, 
fluticasone nasal spray, nitroglycerin p.r.n., rosuvastatin, temazepam and 
Coumadin.



PAST SURGICAL HISTORY:  Status post bypass surgery.  Status post full pacemaker 
placement.



REVIEW OF SYSTEMS:  A 10-point review of systems is negative, except as noted 
above.



FAMILY HISTORY:  No family history of premature coronary disease.  No history 
of unexplained sudden death at a young age.



SOCIAL HISTORY:  He is from [facility] and over the course of his lifetime 
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  He does not 
smoke, he does not drink significant amounts of alcohol.  He has 3 children, 
two children healthy, one child is dealing with melanoma.  He exercises on a 
regular basis.  He has a problem with his left leg due to problems in 
childhood.  He is very, very active.



PHYSICAL EXAMINATION:  VITAL SIGNS:  Blood pressure 95/50, heart rate is 65.  
HEENT:  Pupils are equal and reactive, midline, mucous membranes and mouth 
moist.  NECK:  Supple.  CARDIOVASCULAR:  S1 and S2.  He has a murmur of aortic 
stenosis.  A harsh systolic ejection murmur to second right intercostal space.  
He has a holosystolic murmur at the left sternal border.  PULMONARY:  Reveals 
rhonchi bilaterally.  No rales, wheezing, or dullness.  ABDOMEN:  Soft, 
nontender, without masses or CVA, no tenderness.  EXTREMITIES:  No edema, 
inflammation or ulceration.  NEUROLOGIC:  Cranial nerves 2-12 are grossly 
intact.  SKIN:  Has age-related changes.  PSYCH:  No obvious anxiety or 
depression.



LABORATORY DATA:  White count is 4.99, hematocrit 31, platelets 130.  His D-
Dimer is 1.03, with an INR of 2.2.  His sodium is 142, potassium 4.2, chloride 
102, CO2 is 31, BUN is 29, creatinine 1.4.  His magnesium is 2.4.  He has 
indeterminate troponin levels.  TSH is 1.22. 



A chest x-ray shows cardiomegaly without acute failures, subacute sternal 
fracture unchanged.  Left basilar pleural-parenchymal scarring and stigmata of 
coronary vascular disease are unchanged. 



His EKG shows atrial fibrillation, right bundle branch block, left anterior 
fascicular block.



ASSESSMENT AND PLAN:  

1.  Coronary artery disease.  

2.  Status post cardiac arrest.

3.  Status post coronary artery bypass grafting.  

4.  Status post coronary stenting.

5.  Lipids.

6.  Prostate cancer.

7.  History of aspiration pneumonia.

8.  Factor V Leiden deficiency.

9.  History of pulmonary embolism. 



The patient has not been feeling well and the reason is not clear.  What we 
have to worry a little bit about is he has had chest pain and he has had marked 
new shortness of breath.  He could be having pulmonary embolic problems, even 
though he is on full Coumadin and doing well with the Coumadin.  So, because 
the D-dimer is up we are going to repeat the D-dimer and do a CT scan of the 
chest to rule out PE.  There is nothing to suggest an acute coronary syndrome 
going on.  There is nothing to suggest that his current complaints are due to 
coronary artery disease.  He is not having anything that sounds like typical 
angina.  He had short bursts of chest pain associated with shortness of breath, 
not associated with stress, not associated with exertion. 



He has had a recent coronary angiogram within 2 months that showed he had 
excellent flow through his grafts.  He has troponin that are borderline 
elevated.  He has known valvular heart disease.  He has a history of aspiration 
pneumonia.  His pacemaker is functioning well.  It has been reviewed by the 
company, and by me, and does not seem to be causing him any trouble. 



So at this point in time I would monitor him in the hospital to rule out 
pulmonary embolic disease.  If he has recurrent pulmonary emboli on full 
Coumadin anticoagulation we will adjust what we do for him.  We can make his 
blood thinner, or if that is problematic we can consider other devices. 



I have answered all his questions, I have answered his wife's questions.  I had 
a chance to talk to the hospitalist about him.  There does not appear to be any 
severe coronary artery problem right this minute, and no significant heart 
failure. 



Thank you very much for asking me to see him.





Job #:  [id number]

MTDD

## 2017-10-02 NOTE — EDPHY
H & P


Stated Complaint: SENT BY MD FOR TESTING


Time Seen by Provider: 10/02/17 21:02


HPI/ROS: 





CHIEF COMPLAINT: "I have blood in my urine "  





HISTORY OF PRESENT ILLNESS: 83-year-old male with multiple medical 

comorbidities arrives via private vehicle for evaluation of new onset hematuria

, decreased frequency.  He was seen by his primary care prior to today noted to 

have urinary abnormality  and told to go to the ER for CT imaging possible 

nephrolithiasis.  No dysuria.  No back or flank pain.  No abdominal pain.  No 

fever or chills.  No chest pain.  No dyspnea. 





PRIMARY CARE PROVIDER:Dr. Kenneth Prado  





REVIEW OF SYSTEMS:


A ten point review of systems was performed and is negative with the exception 

of the items mentioned in the HPI








PAST MEDICAL & SURGICAL  HISTORY:  Hyperlipidemia.  Metastatic prostate cancer.

  Ramon stenosis.  History of DVT and pulmonary embolus.  Chronic warfarin 

therapy.  COPD.  Chronic home O2





SOCIAL HISTORY: nonsmoker lives with his wife    











************


PHYSICAL EXAM





(Prior to examination, patient consented to physical exam, hands were washed 

and my usual and customary physical exam procedures followed)


1) GENERAL: Well-developed, well-nourished, alert and oriented.  Appears to be 

in no acute distress.


2) HEAD: Normocephalic, atraumatic


3) HEENT: Pupils equal, round, reactive to light bilaterally.  Sclera 

anicteric.  Nasopharynx, oropharynx, clear, no lesions.  


4) NECK: Full range of motion, no meningeal signs.


5) LUNGS: Clear auscultation bilaterally, no wheezes, no rhonchi, no 

retractions.   


6) HEART: Regular rate and rhythm


7) ABDOMEN: No guarding, no rebound, no focal tenderness, negative McBurney's, 

negative Albarado's, negative Rovsing's, negative peritoneal sign, I am unable to 

elicit any abdominal pain on exam.


8) MUSCULOSKELETAL: Moving all extremities, no focal areas of tenderness, no 

obvious trauma.  No peripheral edema or discoloration.


9) BACK: No CVA tenderness, no midline vertebral tenderness, no fluctuance, no 

step-off, no obvious trauma, no visual or palpable abnormality. 


10) SKIN: No rash, no petechiae. 


11)  normal male external genitalia, no urethral discharge or blood.  

Bilateral testicles nontender.  No swelling.








***************





DIFFERENTIAL DIAGNOSIS:  in no particular include but limited to malignancy, UTI

, nephrolithiasis, pyelonephritis, urosepsis 








- Personal History


Current Tetanus/Diphtheria Vaccine: Yes


Current Tetanus Diphtheria and Acellular Pertussis (TDAP): Yes


Tetanus Vaccine Date: 2007





- Medical/Surgical History


Hx Asthma: No


Hx Chronic Respiratory Disease: No


Hx Diabetes: No


Hx Cardiac Disease: Yes


Hx Renal Disease: No


Hx Cirrhosis: No


Hx Alcoholism: No


Hx HIV/AIDS: No


Hx Splenectomy or Spleen Trauma: No


Other PMH: BONE CA,  L LUNG REMOVAL, HIP SURG.  TRIPLE BYPASS 2012. Mets/bone, 

CHF, COPD, MI, stents 2014, PE, severe AS, pacemaker 1/17





- Social History


Smoking Status: Never smoked


Constitutional: 


 Initial Vital Signs











Temperature (C)  36.7 C   10/02/17 20:58


 


Heart Rate  74   10/02/17 20:58


 


Respiratory Rate  18   10/02/17 20:58


 


O2 Sat (%)  94   10/02/17 20:58








 











O2 Delivery Mode               Nasal Cannula


 


O2 (L/minute)                  2














Allergies/Adverse Reactions: 


 





daptomycin Allergy (Verified 10/02/17 21:01)


 








Home Medications: 














 Medication  Instructions  Recorded


 


Tamsulosin HCl [Flomax 0.4 MG (*)] 0.4 mg PO DAILY 03/13/12


 


celeCOXIB [Celebrex (*)] 100 mg PO DAILY 03/13/12


 


Bicalutamide [Casodex] 50 mg PO DAILY 12/30/13


 


Levothyroxine [Synthroid 150 mcg 150 mcg PO DAILY06 12/30/13





(*)]  


 


Acyclovir [Zovirax 400 mg (*)] 400 mg PO BID PRN 01/24/17


 


Digoxin [Lanoxin 250 mcg (RX)] 125 mcg PO DAILY10 01/24/17


 


Fluticasone Nasal [Flonase Nasal 1 sprays EACHNARE DAILY PRN 01/24/17





Spray]  


 


Herbals/Supplements -Info Only 1 ea PO DAILY 01/24/17


 


Nitroglycerin [Nitrostat 0.4 mg 0.4 mg SL Q5M PRN 01/24/17





(*)]  


 


Pantoprazole Sodium [Protonix 40mg 40 mg PO DAILY 01/24/17





(*)]  


 


Rosuvastatin Calcium [Crestor 5mg] 5 mg PO DAILY 01/24/17


 


Spironolactone [Aldactone 25 MG 25 mg PO DAILY 01/24/17





(*)]  


 


Temazepam [Restoril 15 MG (*)] 15 mg PO HSPRN PRN 01/24/17


 


Warfarin Sodium [Coumadin 2.5MG 2.5 mg PO MOWEFR@09 01/24/17





(*)]  


 


Warfarin Sodium [Coumadin 5MG (*)] 5 mg PO SUTUTHSA@09 01/24/17


 


Aspirin [Aspirin 81mg (*)] 81 mg PO DAILY #0 tab.chew 02/03/17


 


Carvedilol [Coreg (*)] 3.125 mg PO BIDMEAL #60 tab 03/25/17


 


Clopidogrel Bisulfate [Plavix (*)] 75 mg PO DAILY 03/25/17


 


Furosemide [Lasix 40 MG (*)] 20 mg PO DAILY 03/25/17














Medical Decision Making


ED Course/Re-evaluation: 





Care of patient under supervision of  secondary supervising physician Dr Taylor .  The patient was re-evaluated with serial examinations.  He has 

remained complaint free while in the emergency department, no abdominal pain, 

no back or flank pain, no nausea or vomiting, no fever or chills.  Discussed 

his noncontrast CT scan showing no nephrolithiasis, no obstruction, discussed 

his other laboratory studies.  At this time I do not identify indication for 

admission.  I spoke with his primary care provider, Dr. Kenneth Prado 

at 11:26 p.m. who agrees there is no indication for admission, no acute 

condition identified.  Patient feels comfortable being discharged.  All 

questions and concerns addressed by myself.  





- Data Points


Laboratory Results: 


 Laboratory Results





 10/02/17 21:10 





 10/02/17 21:10 





 











  10/02/17 10/02/17 10/02/17





  22:00 21:17 21:10


 


WBC      





    


 


RBC      





    


 


Hgb      





    


 


Hct      





    


 


MCV      





    


 


MCH      





    


 


MCHC      





    


 


RDW      





    


 


Plt Count      





    


 


MPV      





    


 


Neut % (Auto)      





    


 


Lymph % (Auto)      





    


 


Mono % (Auto)      





    


 


Eos % (Auto)      





    


 


Baso % (Auto)      





    


 


Nucleat RBC Rel Count      





    


 


Absolute Neuts (auto)      





    


 


Absolute Lymphs (auto)      





    


 


Absolute Monos (auto)      





    


 


Absolute Eos (auto)      





    


 


Absolute Basos (auto)      





    


 


Absolute Nucleated RBC      





    


 


Immature Gran %      





    


 


Seg Neutrophils %      





    


 


Lymphocytes %      





    


 


Monocytes %      





    


 


Basophils %      





    


 


Immature Gran #      





    


 


Absolute Seg Neuts      





    


 


Absolute Lymphocytes      





    


 


Absolute Monocytes      





    


 


Absolute Basophils      





    


 


RBC/WBC/PLT Morphology      





    


 


Platelet Estimate      





    


 


PT      





    


 


INR      





    


 


APTT      





    


 


Sodium      





    


 


Potassium      





    


 


Chloride      





    


 


Carbon Dioxide      





    


 


Anion Gap      





    


 


BUN      





    


 


Creatinine      





    


 


Estimated GFR      





    


 


Glucose      





    


 


Calcium      





    


 


Total Bilirubin      1.2 mg/dL mg/dL





     (0.1-1.4) 


 


Conjugated Bilirubin      0.5 mg/dL mg/dL





     (0.0-0.5) 


 


Unconjugated Bilirubin      0.7 mg/dL mg/dL





     (0.0-1.1) 


 


AST      27 IU/L IU/L





     (17-59) 


 


ALT      27 IU/L IU/L





     (21-72) 


 


Alkaline Phosphatase      79 IU/L IU/L





     () 


 


Creatine Kinase    23 IU/L IU/L  





    (0-224)  


 


Total Protein      7.4 g/dL g/dL





     (6.3-8.2) 


 


Albumin      3.7 g/dL g/dL





     (3.5-5.0) 


 


Lipase      144 IU/L IU/L





     () 


 


Urine Color  YELLOW     





    


 


Urine Appearance  HAZY     





    


 


Urine pH  5.0     





   (5.0-7.5)   


 


Ur Specific Gravity  1.018     





   (1.002-1.030)   


 


Urine Protein  1+  H     





   (NEGATIVE)   


 


Urine Ketones  NEGATIVE     





   (NEGATIVE)   


 


Urine Blood  NEGATIVE     





   (NEGATIVE)   


 


Urine Nitrate  NEGATIVE     





   (NEGATIVE)   


 


Urine Bilirubin  NEGATIVE     





   (NEGATIVE)   


 


Urine Urobilinogen  2.0 EU H EU    





   (0.2-1.0)   


 


Ur Leukocyte Esterase  NEGATIVE     





   (NEGATIVE)   


 


Urine RBC  10-15 /hpf H /hpf    





   (0-3)   


 


Urine WBC  1-3 /hpf /hpf    





   (0-3)   


 


Ur Epithelial Cells  TRACE /lpf /lpf    





   (NONE-1+)   


 


Hyaline Casts  5-15 /lpf /lpf    





   (0-1)   


 


Urine Mucus  2+ /lpf H /lpf    





   (NONE-1+)   


 


Urine Glucose  NEGATIVE     





   (NEGATIVE)   














  10/02/17 10/02/17 10/02/17





  21:10 21:10 21:10


 


WBC      5.47 10^3/uL 10^3/uL





     (3.80-9.50) 


 


RBC      4.38 10^6/uL L 10^6/uL





     (4.40-6.38) 


 


Hgb      12.6 g/dL L g/dL





     (13.7-17.5) 


 


Hct      39.7 % L %





     (40.0-51.0) 


 


MCV      90.6 fL fL





     (81.5-99.8) 


 


MCH      28.8 pg pg





     (27.9-34.1) 


 


MCHC      31.7 g/dL L g/dL





     (32.4-36.7) 


 


RDW      18.3 % H %





     (11.5-15.2) 


 


Plt Count      152 10^3/uL 10^3/uL





     (150-400) 


 


MPV      11.4 fL fL





     (8.7-11.7) 


 


Neut % (Auto)      Not Reported 





    


 


Lymph % (Auto)      Not Reported 





    


 


Mono % (Auto)      Not Reported 





    


 


Eos % (Auto)      Not Reported 





    


 


Baso % (Auto)      Not Reported 





    


 


Nucleat RBC Rel Count      0.0 % %





     (0.0-0.2) 


 


Absolute Neuts (auto)      Not Reported 





    


 


Absolute Lymphs (auto)      Not Reported 





    


 


Absolute Monos (auto)      Not Reported 





    


 


Absolute Eos (auto)      Not Reported 





    


 


Absolute Basos (auto)      Not Reported 





    


 


Absolute Nucleated RBC      0.00 10^3/uL 10^3/uL





     (0-0.01) 


 


Immature Gran %      Not Reported 





    


 


Seg Neutrophils %      70 % %





    


 


Lymphocytes %      12 % %





    


 


Monocytes %      17 % %





    


 


Basophils %      1 % %





    


 


Immature Gran #      Not Reported 





    


 


Absolute Seg Neuts      3.83 10^/uL 10^/uL





     (1.70-6.50) 


 


Absolute Lymphocytes      0.66 10^3/uL L 10^3/uL





     (1.00-3.00) 


 


Absolute Monocytes      0.93 10^3/uL H 10^3/uL





     (0.30-0.80) 


 


Absolute Basophils      0.05 10^3/uL 10^3/uL





     (0.02-0.10) 


 


RBC/WBC/PLT Morphology      NORMAL 





     (NORMAL) 


 


Platelet Estimate      ADEQUATE 





     (ADEQ) 


 


PT    31.1 SEC H SEC  





    (12.0-15.0)  


 


INR    2.95  H   





    (0.83-1.16)  


 


APTT    49.6 SEC H SEC  





    (23.0-38.0)  


 


Sodium  138 mEq/L mEq/L    





   (134-144)   


 


Potassium  4.0 mEq/L mEq/L    





   (3.5-5.2)   


 


Chloride  97 mEq/L mEq/L    





   ()   


 


Carbon Dioxide  28 mEq/l mEq/l    





   (22-31)   


 


Anion Gap  13 mEq/L mEq/L    





   (8-16)   


 


BUN  21 mg/dL mg/dL    





   (7-23)   


 


Creatinine  1.3 mg/dL mg/dL    





   (0.7-1.3)   


 


Estimated GFR  53     





    


 


Glucose  115 mg/dL H mg/dL    





   ()   


 


Calcium  9.1 mg/dL mg/dL    





   (8.5-10.4)   


 


Total Bilirubin      





    


 


Conjugated Bilirubin      





    


 


Unconjugated Bilirubin      





    


 


AST      





    


 


ALT      





    


 


Alkaline Phosphatase      





    


 


Creatine Kinase      





    


 


Total Protein      





    


 


Albumin      





    


 


Lipase      





    


 


Urine Color      





    


 


Urine Appearance      





    


 


Urine pH      





    


 


Ur Specific Gravity      





    


 


Urine Protein      





    


 


Urine Ketones      





    


 


Urine Blood      





    


 


Urine Nitrate      





    


 


Urine Bilirubin      





    


 


Urine Urobilinogen      





    


 


Ur Leukocyte Esterase      





    


 


Urine RBC      





    


 


Urine WBC      





    


 


Ur Epithelial Cells      





    


 


Hyaline Casts      





    


 


Urine Mucus      





    


 


Urine Glucose      





    














Departure





- Departure


Disposition: Home, Routine, Self-Care


Clinical Impression: 


 transient hematuria





Condition: Good


Instructions:  Hematuria (ED)


Additional Instructions: 


Return to the emergency department if you develop abdominal pain, fevers, 

urinary abnormality or any other symptoms that concern you.


Referrals: 


Michael Prado MD [Primary Care Provider] - 2-3 days, call for appt.

## 2017-10-25 NOTE — EDPHY
H & P


Stated Complaint: PT HAS HAD WEAKNESS X 3 WEEKS,  C/O DIARRHEA AND NOW 

CONSTIPATION


Time Seen by Provider: 10/25/17 22:56


HPI/ROS: 





Chief Complaint:  Weakness, diarrhea





HPI:  83-year-old male the presenting with diarrhea for the last 4 weeks.  

Patient was traveling in [place].  He then developed some abdominal 

pain.  He called spoke with his primary care physician's or weeks ago who 

started him on ciprofloxacin.  Patient continues to have diarrhea has been 

having worsening fatigue over the last week or 2.  He is going to the point 

where he is able to only walk maybe 10 feet at most.  Has a history of factor 5 

Leiden and is on Coumadin.  He has not had any dark tarry stools or blood.  No 

nausea or vomiting.  The abdominal pain has since resolved.  No fevers.  No 

chest pain or shortness of breath.  No nausea or vomiting.  Has had some 

decreased urination.  Patient is describing 3-4 soft stools a day.  Patient 

called again and spoke with primary care physician was instructed to come 

directly to the emergency department when he came back into town for further 

testing.  Of note patient has a history of significant aortic regurgitation.





ROS:  10 point Review of Systems is negative except as noted in the HPI.





PMH:  Coronary artery disease, aortic regurgitation, factor 5 Leiden, PE, 

metastatic prostate cancer





Social History: No smoking, occasional alcohol,  no recreational drug use





Family History: non-contributory





Physical Exam:


Gen: Awake, Alert, No Distress


HEENT:  


     Nose: no rhinorrhea


     Eyes: PERRLA, EOMI


     Mouth:  Dry mucosa 


Neck: Supple, no JVD


Chest: nontender, lungs clear to auscultation


Heart: S1, S2 normal, no murmur


Abd: Soft, non-tender, no guarding


Back: no CVA tenderness, no midline tenderness 


Ext: no edema, non-tender


Skin: no rash


Neuro: CN II-XII intact, Sensation grossly intact, Strength 5/5 in bilateral 

upper and lower extremities








- Personal History


Tetanus Vaccine Date: 2007





- Medical/Surgical History


Hx Asthma: No


Hx Chronic Respiratory Disease: No


Hx Diabetes: No


Hx Cardiac Disease: Yes


Hx Renal Disease: No


Hx Cirrhosis: No


Hx Alcoholism: No


Hx HIV/AIDS: No


Hx Splenectomy or Spleen Trauma: No


Other PMH: BONE CA,  L LUNG REMOVAL, HIP SURG.  TRIPLE BYPASS 2012. Mets/bone, 

CHF, COPD, MI, stents 2014, PE, severe AS, pacemaker 1/17





- Social History


Smoking Status: Never smoked


Constitutional: 


 Initial Vital Signs











Temperature (C)  36.8 C   10/25/17 22:49


 


Heart Rate  76   10/25/17 22:49


 


Respiratory Rate  18   10/25/17 22:49


 


Blood Pressure  105/57 L  10/25/17 22:49


 


O2 Sat (%)  92   10/25/17 22:49








 











O2 Delivery Mode               Nasal Cannula


 


O2 (L/minute)                  2














Allergies/Adverse Reactions: 


 





daptomycin Allergy (Verified 10/02/17 21:01)


 








Home Medications: 














 Medication  Instructions  Recorded


 


Tamsulosin HCl [Flomax 0.4 MG (*)] 0.4 mg PO DAILY 03/13/12


 


celeCOXIB [Celebrex (*)] 100 mg PO DAILY 03/13/12


 


Bicalutamide [Casodex] 50 mg PO DAILY 12/30/13


 


Levothyroxine [Synthroid 150 mcg 150 mcg PO DAILY06 12/30/13





(*)]  


 


Acyclovir [Zovirax 400 mg (*)] 400 mg PO BID PRN 01/24/17


 


Digoxin [Lanoxin 250 mcg (RX)] 125 mcg PO DAILY10 01/24/17


 


Fluticasone Nasal [Flonase Nasal 1 sprays EACHNARE DAILY PRN 01/24/17





Spray]  


 


Herbals/Supplements -Info Only 1 ea PO DAILY 01/24/17


 


Nitroglycerin [Nitrostat 0.4 mg 0.4 mg SL Q5M PRN 01/24/17





(*)]  


 


Pantoprazole Sodium [Protonix 40mg 40 mg PO DAILY 01/24/17





(*)]  


 


Rosuvastatin Calcium [Crestor 5mg] 5 mg PO DAILY 01/24/17


 


Spironolactone [Aldactone 25 MG 25 mg PO DAILY 01/24/17





(*)]  


 


Temazepam [Restoril 15 MG (*)] 15 mg PO HSPRN PRN 01/24/17


 


Warfarin Sodium [Coumadin 2.5MG 2.5 mg PO MOWEFR@09 01/24/17





(*)]  


 


Warfarin Sodium [Coumadin 5MG (*)] 5 mg PO SUTUTHSA@09 01/24/17


 


Aspirin [Aspirin 81mg (*)] 81 mg PO DAILY #0 tab.chew 02/03/17


 


Carvedilol [Coreg (*)] 3.125 mg PO BIDMEAL #60 tab 03/25/17


 


Clopidogrel Bisulfate [Plavix (*)] 75 mg PO DAILY 03/25/17


 


Furosemide [Lasix 40 MG (*)] 20 mg PO DAILY 03/25/17














Medical Decision Making





- Diagnostics


EKG Interpretation: 





ECG time 12:56 a.m., atrial placed ECG with a rate of 85. PVCs present.


Imaging Results: 


 Imaging Impressions





Chest X-Ray  10/25/17 23:19


Impression:


1. Mild to moderate bibasilar effusions with adjacent compressive atelectatic 

change. Effusion on the right also extends into the major fissure.


2. Persistent chronic consolidation or atelectasis left base. There is 

associated elevation left hemidiaphragm.


3. Postoperative changes from previous open heart surgery.


4. Old fractures posterior left ribs.











Imaging: I viewed and interpreted images myself


ED Course/Re-evaluation: 





83-year-old male with fatigue and loose stools for the last 3 weeks.  He does 

have an elevated bilirubin.  In reviewing his records he has a history of 

gallstones in the past.  Patient has a soft benign abdomen.  Patient also has 

an elevation in his troponin.  He has not had any chest pain.  I have discussed 

with Dr. Cardozo, hospitalist.  He will admit to the hospital for further 

evaluation.





- Data Points


Laboratory Results: 


 Laboratory Results





 10/25/17 23:25 





 10/25/17 23:25 





 











  10/25/17 10/25/17 10/25/17





  23:45 23:25 23:25


 


WBC      





    


 


RBC      





    


 


Hgb      





    


 


Hct      





    


 


MCV      





    


 


MCH      





    


 


MCHC      





    


 


RDW      





    


 


Plt Count      





    


 


MPV      





    


 


Neut % (Auto)      





    


 


Lymph % (Auto)      





    


 


Mono % (Auto)      





    


 


Eos % (Auto)      





    


 


Baso % (Auto)      





    


 


Nucleat RBC Rel Count      





    


 


Absolute Neuts (auto)      





    


 


Absolute Lymphs (auto)      





    


 


Absolute Monos (auto)      





    


 


Absolute Eos (auto)      





    


 


Absolute Basos (auto)      





    


 


Absolute Nucleated RBC      





    


 


Immature Gran %      





    


 


Immature Gran #      





    


 


Platelet Estimate      





    


 


PT      35.6 SEC H SEC





     (12.0-15.0) 


 


INR      3.49  H 





     (0.83-1.16) 


 


APTT      46.6 SEC H SEC





     (23.0-38.0) 


 


Sodium    142 mEq/L mEq/L  





    (134-144)  


 


Potassium    3.8 mEq/L mEq/L  





    (3.5-5.2)  


 


Chloride    99 mEq/L mEq/L  





    ()  


 


Carbon Dioxide    30 mEq/l mEq/l  





    (22-31)  


 


Anion Gap    13 mEq/L mEq/L  





    (8-16)  


 


BUN    20 mg/dL mg/dL  





    (7-23)  


 


Creatinine    1.2 mg/dL mg/dL  





    (0.7-1.3)  


 


Estimated GFR    58   





    


 


Glucose    82 mg/dL mg/dL  





    ()  


 


Calcium    8.7 mg/dL mg/dL  





    (8.5-10.4)  


 


Total Bilirubin    1.7 mg/dL H mg/dL  





    (0.1-1.4)  


 


AST    26 IU/L IU/L  





    (17-59)  


 


ALT    33 IU/L IU/L  





    (21-72)  


 


Alkaline Phosphatase    109 IU/L IU/L  





    ()  


 


Troponin I    0.120 ng/mL H ng/mL  





    (0.000-0.034)  


 


Total Protein    7.9 g/dL g/dL  





    (6.3-8.2)  


 


Albumin    2.9 g/dL L g/dL  





    (3.5-5.0)  


 


Lipase    60 IU/L IU/L  





    ()  


 


Urine Color  EMILIO     





    


 


Urine Appearance  HAZY     





    


 


Urine pH  5.0     





   (5.0-7.5)   


 


Ur Specific Gravity  1.020     





   (1.002-1.030)   


 


Urine Protein  1+  H     





   (NEGATIVE)   


 


Urine Ketones  TRACE  H     





   (NEGATIVE)   


 


Urine Blood  NEGATIVE     





   (NEGATIVE)   


 


Urine Nitrate  NEGATIVE     





   (NEGATIVE)   


 


Urine Bilirubin  NEGATIVE     





   (NEGATIVE)   


 


Urine Urobilinogen  2.0 EU H EU    





   (0.2-1.0)   


 


Ur Leukocyte Esterase  NEGATIVE     





   (NEGATIVE)   


 


Urine RBC  1-3 /hpf /hpf    





   (0-3)   


 


Urine WBC  1-3 /hpf /hpf    





   (0-3)   


 


Ur Epithelial Cells  TRACE /lpf /lpf    





   (NONE-1+)   


 


Hyaline Casts  1-5 /lpf /lpf    





   (0-1)   


 


Urine Mucus  3+ /lpf H /lpf    





   (NONE-1+)   


 


Urine Glucose  NEGATIVE     





   (NEGATIVE)   














  10/25/17





  23:25


 


WBC  5.18 10^3/uL 10^3/uL





   (3.80-9.50) 


 


RBC  4.29 10^6/uL L 10^6/uL





   (4.40-6.38) 


 


Hgb  12.5 g/dL L g/dL





   (13.7-17.5) 


 


Hct  37.8 % L %





   (40.0-51.0) 


 


MCV  88.1 fL fL





   (81.5-99.8) 


 


MCH  29.1 pg pg





   (27.9-34.1) 


 


MCHC  33.1 g/dL g/dL





   (32.4-36.7) 


 


RDW  20.5 % H %





   (11.5-15.2) 


 


Plt Count  100 10^3/uL L 10^3/uL





   (150-400) 


 


MPV  10.4 fL fL





   (8.7-11.7) 


 


Neut % (Auto)  Not Reported 





  


 


Lymph % (Auto)  Not Reported 





  


 


Mono % (Auto)  Not Reported 





  


 


Eos % (Auto)  Not Reported 





  


 


Baso % (Auto)  Not Reported 





  


 


Nucleat RBC Rel Count  0.0 % %





   (0.0-0.2) 


 


Absolute Neuts (auto)  Not Reported 





  


 


Absolute Lymphs (auto)  Not Reported 





  


 


Absolute Monos (auto)  Not Reported 





  


 


Absolute Eos (auto)  Not Reported 





  


 


Absolute Basos (auto)  Not Reported 





  


 


Absolute Nucleated RBC  0.00 10^3/uL 10^3/uL





   (0-0.01) 


 


Immature Gran %  Not Reported 





  


 


Immature Gran #  Not Reported 





  


 


Platelet Estimate  Pending 





  


 


PT  





  


 


INR  





  


 


APTT  





  


 


Sodium  





  


 


Potassium  





  


 


Chloride  





  


 


Carbon Dioxide  





  


 


Anion Gap  





  


 


BUN  





  


 


Creatinine  





  


 


Estimated GFR  





  


 


Glucose  





  


 


Calcium  





  


 


Total Bilirubin  





  


 


AST  





  


 


ALT  





  


 


Alkaline Phosphatase  





  


 


Troponin I  





  


 


Total Protein  





  


 


Albumin  





  


 


Lipase  





  


 


Urine Color  





  


 


Urine Appearance  





  


 


Urine pH  





  


 


Ur Specific Gravity  





  


 


Urine Protein  





  


 


Urine Ketones  





  


 


Urine Blood  





  


 


Urine Nitrate  





  


 


Urine Bilirubin  





  


 


Urine Urobilinogen  





  


 


Ur Leukocyte Esterase  





  


 


Urine RBC  





  


 


Urine WBC  





  


 


Ur Epithelial Cells  





  


 


Hyaline Casts  





  


 


Urine Mucus  





  


 


Urine Glucose  





  














Departure





- Departure


Disposition: [facility]


Clinical Impression: 


 Elevated bilirubin, Elevated troponin





Condition: Fair


Referrals: 


Michael Prado MD [Primary Care Provider] - As per Instructions

## 2017-10-26 NOTE — CPEKG
Heart Rate: 85

RR Interval: 706

P-R Interval: 252

QRSD Interval: 170

QT Interval: 464

QTC Interval: 552

P Axis: -65

QRS Axis: -63

T Wave Axis: 0

EKG Severity - ABNORMAL ECG -

EKG Impression: ATRIAL-PACED COMPLEXES

EKG Impression: MULTIPLE VENTRICULAR PREMATURE COMPLEXES

EKG Impression: FIRST DEGREE AV BLOCK

EKG Impression: RIGHT BUNDLE BRANCH BLOCK

Electronically Signed By: Salinas Conteh 26-Oct-2017 06:15:54

## 2017-10-26 NOTE — PDMN
Medical Necessity


Medical necessity: Change to IP per MD; los >2 mn for eval/tx CHF exacerbation, 

aortic stenosis w/possible progressive valvular disease, FTT, fatigue, 

exertional dyspnea; hx AFIB, Factor V Leiden w/PEs, anemia; per progress note 10

/26/17

## 2017-10-26 NOTE — PDGENHP
History and Physical





- Chief Complaint


Fatigue





- History of Present Illness


84 yo M w/ hx of CAD s/p CABG (3v 2012), PCI (LIMA graft 2014), HTN, AS, AI, 

Afib, CHF, prostate CA, and FVL w/ prior PE's on coumadin presents with 3 weeks 

of fatigue. Patient states he has experienced progressive fatigue over the last 

3 weeks to the point where he can barely get out bed. He also describes 

intermittent SOB but no chest pain. Additionally, he first noted some loose 

stools about 4 weeks ago. He took some diarrhea medicine and then became 

constipated. At this point he says his PCP prescribed Ciprofloxacin, which led 

to him having diarrhea again. Over the last 2 days he has also been having 

incontinence as a result of the diarrhea. ROS also notable for mild LLQ pain 

and increased LE edema. He denies fevers, chills, and dysuria.





History Information





- Allergies/Home Medication List


Allergies/Adverse Reactions: 








daptomycin Allergy (Verified 10/02/17 21:01)


 





Home Medications: 








Tamsulosin HCl [Flomax 0.4 MG (*)] 0.4 mg PO DAILY 03/13/12 [Last Taken 03/24/17

]


celeCOXIB [Celebrex (*)] 100 mg PO DAILY 03/13/12 [Last Taken 03/24/17]


Bicalutamide [Casodex] 50 mg PO DAILY 12/30/13 [Last Taken 03/24/17]


Levothyroxine [Synthroid 150 mcg (*)] 150 mcg PO DAILY06 12/30/13 [Last Taken 03 /24/17]


Acyclovir [Zovirax 400 mg (*)] 400 mg PO BID PRN 01/24/17 [Last Taken 03/24/17]


Digoxin [Lanoxin 250 mcg (RX)] 125 mcg PO DAILY10 01/24/17 [Last Taken 03/24/17]


Fluticasone Nasal [Flonase Nasal Spray] 1 sprays EACHNARE DAILY PRN 01/24/17 [

Last Taken 03/24/17]


Herbals/Supplements -Info Only 1 ea PO DAILY 01/24/17 [Last Taken 03/24/17]


Nitroglycerin [Nitrostat 0.4 mg (*)] 0.4 mg SL Q5M PRN 01/24/17 [Last Taken 03/ 24/17]


Pantoprazole Sodium [Protonix 40mg (*)] 40 mg PO DAILY 01/24/17 [Last Taken 03/ 24/17]


Rosuvastatin Calcium [Crestor 5mg] 5 mg PO DAILY 01/24/17 [Last Taken 03/24/17]


Spironolactone [Aldactone 25 MG (*)] 25 mg PO DAILY 01/24/17 [Last Taken 03/24/ 17]


Temazepam [Restoril 15 MG (*)] 15 mg PO HSPRN PRN 01/24/17 [Last Taken 03/24/17]


Warfarin Sodium [Coumadin 2.5MG (*)] 2.5 mg PO MOWEFR@09 01/24/17 [Last Taken 03 /24/17]


Warfarin Sodium [Coumadin 5MG (*)] 5 mg PO SUTUTHSA@09 01/24/17 [Last Taken 03/ 24/17]


Clopidogrel Bisulfate [Plavix (*)] 75 mg PO DAILY 03/25/17 [Last Taken 03/24/17]


Furosemide [Lasix 40 MG (*)] 20 mg PO DAILY 03/25/17 [Last Taken 03/24/17]





I have personally reviewed and updated: family history, medical history





- Past Medical History


atrial fibrillation, coronary artery disease, cancer, DVT, GI bleed, 

hypertension, hyperlipidemia, myocardial infarction, pulmonary embolism


Additional medical history: Asystolic cardiac arrest 1/2017, s/p BiV PPM.  

Coronary artery disease status post CABG.  Lungs sarcoma, status post partial 

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  Factor 5 Leiden 

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





- Surgical History


Reports: angioplasty, coronary bypass surgery, coronary stent





- Family History


Positive for: CAD





- Social History


Smoking Status: Never smoked


Additional social history: Lives with wife, walks independently





Review of Systems


Review of Systems: 





ROS: 10pt was reviewed & negative except for what was stated in HPI & below





Physical Exam


Physical Exam: 

















Temp Pulse Resp BP Pulse Ox


 


 36.8 C   75   18   107/62   96 


 


 10/25/17 22:49  10/26/17 01:57  10/26/17 01:57  10/26/17 01:57  10/26/17 01:57




















O2 (L/minute)                  2














Constitutional: no apparent distress, chronically ill appearing, uncomfortable


Eyes: PERRL, EOMI


Ears, Nose, Mouth, Throat: dry mucous membranes, No oral thrush


Cardiovascular: systolic murmur, diastolic murmur, JVD (To angle of mandible), 

edema (2+ b/l LEILA to upper shins)


Respiratory: no respiratory distress, reduced air movement (Bases L>R), 

inspiratory crackles


Gastrointestinal: normoactive bowel sounds, tenderness (Mild, LLQ)


Skin: warm, no induration


Musculoskeletal: full muscle strength, no muscle tenderness


Neurologic: AAOx3, CN II-XII Intact


Psychiatric: interacting appropriately, not anxious





Lab Data & Imaging Review





 10/25/17 23:25





 10/25/17 23:25














WBC  5.18 10^3/uL (3.80-9.50)   10/25/17  23:25    


 


RBC  4.29 10^6/uL (4.40-6.38)  L  10/25/17  23:25    


 


Hgb  12.5 g/dL (13.7-17.5)  L  10/25/17  23:25    


 


Hct  37.8 % (40.0-51.0)  L  10/25/17  23:25    


 


MCV  88.1 fL (81.5-99.8)   10/25/17  23:25    


 


MCH  29.1 pg (27.9-34.1)   10/25/17  23:25    


 


MCHC  33.1 g/dL (32.4-36.7)   10/25/17  23:25    


 


RDW  20.5 % (11.5-15.2)  H  10/25/17  23:25    


 


Plt Count  100 10^3/uL (150-400)  L  10/25/17  23:25    


 


MPV  10.4 fL (8.7-11.7)   10/25/17  23:25    


 


Neut % (Auto)  Not Reported   10/25/17  23:25    


 


Lymph % (Auto)  Not Reported   10/25/17  23:25    


 


Mono % (Auto)  Not Reported   10/25/17  23:25    


 


Eos % (Auto)  Not Reported   10/25/17  23:25    


 


Baso % (Auto)  Not Reported   10/25/17  23:25    


 


Nucleat RBC Rel Count  0.0 % (0.0-0.2)   10/25/17  23:25    


 


Absolute Neuts (auto)  Not Reported   10/25/17  23:25    


 


Absolute Lymphs (auto)  Not Reported   10/25/17  23:25    


 


Absolute Monos (auto)  Not Reported   10/25/17  23:25    


 


Absolute Eos (auto)  Not Reported   10/25/17  23:25    


 


Absolute Basos (auto)  Not Reported   10/25/17  23:25    


 


Absolute Nucleated RBC  0.00 10^3/uL (0-0.01)   10/25/17  23:25    


 


Immature Gran %  Not Reported   10/25/17  23:25    


 


Seg Neutrophils %  64 %  10/25/17  23:25    


 


Band Neutrophils %  1 %  10/25/17  23:25    


 


Lymphocytes %  15 %  10/25/17  23:25    


 


Monocytes %  16 %  10/25/17  23:25    


 


Eosinophils %  4 %  10/25/17  23:25    


 


Immature Gran #  Not Reported   10/25/17  23:25    


 


Absolute Seg Neuts  3.32 10^/uL (1.70-6.50)   10/25/17  23:25    


 


Absolute Band Neuts  0.05 10^3/uL (0.00-0.70)   10/25/17  23:25    


 


Absolute Lymphocytes  0.78 10^3/uL (1.00-3.00)  L  10/25/17  23:25    


 


Absolute Monocytes  0.83 10^3/uL (0.30-0.80)  H  10/25/17  23:25    


 


Absolute Eosinophils  0.21 10^3/uL (0.03-0.40)   10/25/17  23:25    


 


Atypical Lymphocytes  1+  H  10/25/17  23:25    


 


Platelet Estimate  DECREASED  (ADEQ)  L  10/25/17  23:25    


 


Polychromasia  1+  H  10/25/17  23:25    


 


Hypochromasia  1+  H  10/25/17  23:25    


 


Tear Drop Cells  1+  H  10/25/17  23:25    


 


Schistocytes  1+  H  10/25/17  23:25    


 


PT  35.6 SEC (12.0-15.0)  H  10/25/17  23:25    


 


INR  3.49  (0.83-1.16)  H  10/25/17  23:25    


 


APTT  46.6 SEC (23.0-38.0)  H  10/25/17  23:25    


 


Sodium  142 mEq/L (134-144)   10/25/17  23:25    


 


Potassium  3.8 mEq/L (3.5-5.2)   10/25/17  23:25    


 


Chloride  99 mEq/L ()   10/25/17  23:25    


 


Carbon Dioxide  30 mEq/l (22-31)   10/25/17  23:25    


 


Anion Gap  13 mEq/L (8-16)   10/25/17  23:25    


 


BUN  20 mg/dL (7-23)   10/25/17  23:25    


 


Creatinine  1.2 mg/dL (0.7-1.3)   10/25/17  23:25    


 


Estimated GFR  58   10/25/17  23:25    


 


Glucose  82 mg/dL ()   10/25/17  23:25    


 


Calcium  8.7 mg/dL (8.5-10.4)   10/25/17  23:25    


 


Total Bilirubin  1.7 mg/dL (0.1-1.4)  H  10/25/17  23:25    


 


AST  26 IU/L (17-59)   10/25/17  23:25    


 


ALT  33 IU/L (21-72)   10/25/17  23:25    


 


Alkaline Phosphatase  109 IU/L ()   10/25/17  23:25    


 


Troponin I  0.120 ng/mL (0.000-0.034)  H  10/25/17  23:25    


 


Total Protein  7.9 g/dL (6.3-8.2)   10/25/17  23:25    


 


Albumin  2.9 g/dL (3.5-5.0)  L  10/25/17  23:25    


 


Lipase  60 IU/L ()   10/25/17  23:25    


 


Urine Color  EMILIO   10/25/17  23:45    


 


Urine Appearance  HAZY   10/25/17  23:45    


 


Urine pH  5.0  (5.0-7.5)   10/25/17  23:45    


 


Ur Specific Gravity  1.020  (1.002-1.030)   10/25/17  23:45    


 


Urine Protein  1+  (NEGATIVE)  H  10/25/17  23:45    


 


Urine Ketones  TRACE  (NEGATIVE)  H  10/25/17  23:45    


 


Urine Blood  NEGATIVE  (NEGATIVE)   10/25/17  23:45    


 


Urine Nitrate  NEGATIVE  (NEGATIVE)   10/25/17  23:45    


 


Urine Bilirubin  NEGATIVE  (NEGATIVE)   10/25/17  23:45    


 


Urine Urobilinogen  2.0 EU (0.2-1.0)  H  10/25/17  23:45    


 


Ur Leukocyte Esterase  NEGATIVE  (NEGATIVE)   10/25/17  23:45    


 


Urine RBC  1-3 /hpf (0-3)   10/25/17  23:45    


 


Urine WBC  1-3 /hpf (0-3)   10/25/17  23:45    


 


Ur Epithelial Cells  TRACE /lpf (NONE-1+)   10/25/17  23:45    


 


Hyaline Casts  1-5 /lpf (0-1)   10/25/17  23:45    


 


Urine Mucus  3+ /lpf (NONE-1+)  H  10/25/17  23:45    


 


Urine Glucose  NEGATIVE  (NEGATIVE)   10/25/17  23:45    








Imaging Review: 


CXR with bibasilar effusions and volume loss on L side w/ elevation of L 

hemidiaphragm.


RUQ US with gallstones and ascites.


Visualized and Interpreted EKG results: Yes


EKG Interpretation: Positive for: right bundle branch block (Frequent ectopy)





Assessment & Plan


Assessment: 


84 yo M w/ hx of CAD s/p CABG (3v 2012), PCI (LIMA graft 2014), HTN, AS, AI, 

Afib, CHF, prostate CA, and FVL w/ prior PE's on coumadin presents with 3 weeks 

of fatigue. 


Plan: 


1. Fatigue - Likely multifactorial; considerations include worsening AS leading 

to CHF symptoms, diarrhea resulting in dehydration, effects of progressing 

malignancy, and ACS although this seems less likely. 


- Trend cardiac enzymes, monitor on telemetry


- Obtain TTE


- Cardiology consult


2. Diarrhea - Ongoing for 4 months, most recently leading to incontinence. 


- Will check GI PCR


- Cautious with IVF noting volume overload


3. CHF - Last TTE with EF of 50%. Patient recently increased Lasix to 40 mg qD 

as he noted increasing lower extremity edema. JVP to angle of mandible on 

admission. On DAPT, statin, BB, digoxin, furosemide, and spironolactone as 

outpatient.


- TTE ordered as above


- Will likely benefit from diuresis but should be done gently noting AS


- Lasix 20 IV x1 for this AM, additional meds need to be reconciled


4. CAD s/p CABG (3v 2012), PCI (LIMA graft 2014) with elevated troponin - No 

acute chest pain to suggest ACS, but has significant CAD hx. On DAPT, statin, BB

, digoxin, furosemide, and spironolactone as outpatient.


- Trend enzymes


5. AS - last mean grading 32 mm Hg; TTE ordered to reassess.


6. AF - S/p PPM, on warfarin and carvedilol.


7. Hx FVL, PEs - Warfarin w/ mildly supratherapeutic INR on admission. Monitor 

PT/INR.


8. 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 

Recent CT A/P with lymphadenopathy concerning for worsening disease.


9. Elevated bilirubin - 1.7 on admission. RUQ US with gallstones but no 

obstruction. Will trend CMP.


10. Anemia - Mild, suspect 2/2 chronic disease but will check ferritin.





Diet - NPO pending cardiac work-up


Code - Full per discussion with patient


Ppx - warfarin


Dispo - Admit to observation status

## 2017-10-26 NOTE — ASMTCASEMG
Living Arrangements

 

What is your living           Answers:  With Spouse                           

arrangement? Who do you                                                       

live with?                                                                    

Type Of Residence

 

What kind of residence do     Answers:  House                                 

you live in?                                                                  

Discharge Plan Comments

 

Coordination Status           

Comments                      

Notes:

Pt is a 84 y/o man admitted w/ weakness, elevated bilirubin, and elevated trop. PT and OT have been 


ordered and awaiting recommendations. Needs are TBD at this time. CM to follow.

 

Date Signed:  10/26/2017 04:31 PM

Electronically Signed By:DEION Rey

## 2017-10-26 NOTE — PDCARPN
Cardiology Progress Note


Assessment/Plan: 


83-year-old male usually followed by Dr. Rushing. Has a lengthy cardiac 

history and other medical problems. Issues include: CAD with a 3 vessel CABG in 

2012 followed by PCI of the proximal portion of the LIMA to LAD graft in 2014, 

aortic stenosis/aortic regurgitation, hypertension, paroxysmal atrial 

fibrillation, sick sinus syndrome with prior pacemaker implantation, 

cardiomyopathy with mildly to moderately reduced LV systolic function, 

metastatic prostate cancer, and factor V Leiden mutation with a history of 

prior PEs.





His most critical issue is his aortic valve disease. An echocardiogram 

performed in our office on September 20th demonstrated a severely calcified 

trileaflet aortic valve with a peak/mean gradient of 73/41 mmHg giving a 

calculated aortic valve area of 0.5 cm. Severe aortic regurgitation was also 

present. His LVEF was 38%, down slightly from an echo in June. The patient 

tells me that Dr. Rushing did not think his ejection fraction was as low as 

the report suggested.





Over the past 3 weeks, he was on vacation at [place] in [facility]. 

He experienced progressively worsening fatigue and weakness. He had significant 

dyspnea with modest exertion. He thinks his lower extremity edema may have been 

slightly worse but not markedly so. His furosemide dosage had somewhat recently 

been decreased from 40 mg daily to 20 mg daily. He had been instructed to 

return to 40 mg daily if he noticed significant fluid retention. I'm not sure 

if he put this plan into action. He did contact his PCP while on vacation and 

he was instructed to present immediately to the emergency room at [facility] on his return. He came to the emergency room last night and is now 

admitted for further evaluation.





He has not been experiencing any anginal quality chest discomfort. (A cardiac 

catheterization performed in January of this year demonstrated severe left main 

and native 3 vessel CAD. All of his bypass grafts were patent.) He has not had 

orthopnea or PND. In the emergency room, his chest x-ray demonstrated small to 

moderate bilateral pleural effusions and persistent left hemidiaphragm 

elevation. His ECG demonstrated intermittent sinus beats, atrial pacing, and a 

right bundle branch block. A BNP was not ordered. Troponin levels came back at 

0.120 and 0.116. A repeat echo was requested but I think this can be deferred 

for now since he just had one last month. The diagnosis is not in question and 

any change in LVEF will not materially affect planning at this stage.





On physical examination, he does not appear to be grossly volume overloaded. He 

has mild lower extremity edema. Breath sounds are decreased at the bases.





Recommendations:


Gentle diuresis.


Avoid hypotension.


Continue to monitor for recurrent atrial fibrillation,


His most critical issue is clearly a mechanical problem related to his aortic 

valve. He and Dr. Rushing have discussed TAVR versus a trial of balloon 

valvuloplasty of the aortic valve to determine if he would flourish, making 

TAVR a more attractive option. He currently has an appointment to meet with Dr. Ordonez and Dr. Haney in our valvular heart disease clinic on October 31st.














10/26/17 15:36





Subjective: 





Weakness, fatigue, and dyspnea on exertion.


Objective: 





 Vital Signs (8 Hrs)











  Temp Pulse Resp BP Pulse Ox


 


 10/26/17 15:12  36.3 C  68  19  101/57 L  97


 


 10/26/17 13:34    18  


 


 10/26/17 10:51  36.3 C  66  24 H  99/51 L  94


 


 10/26/17 08:44  37.1 C  68  21 H  97/55 L  96








 Intake/Output (24 Hrs)











 10/25/17 10/26/17 10/27/17





 05:59 05:59 05:59


 


Intake Total  150 


 


Balance  150 


 


Intake:   


 


  Oral (ml)  150 


 


  IV Intake (ml)  0 


 


Other:   


 


  Weight  71.668 kg 


 


  Number of Voids  2 


 


    Bedside Commode   1


 


  Number of Bowel Movements  1 


 


  Number of Stools   


 


    Bedside Commode   1











Result Diagrams: 


 10/26/17 06:04





 10/26/17 06:04


Cardiac Labs: 





 Cardiac Lab Results (72 Hrs)











  10/26/17





  06:04


 


Troponin I  0.116 H














- Physical Exam


Constitutional: other (Chronically ill appearing)


Eyes: anicteric sclera


Ears, Nose, Mouth, Throat: moist mucous membranes


Cardiovascular: regular rate and rhythm, systolic murmur


Respiratory: other (Decreased breath sounds at bases; mild rales.)


Gastrointestinal: normoactive bowel sounds, no masses


Skin: no rashes, other (1+ LE edema)


Neurologic: AAOx3


Psychiatric: not anxious





ICD10 Worksheet


Patient Problems: 


 Problems











Problem Status Onset


 


Elevated bilirubin Acute  


 


Elevated troponin Acute  


 


Coronary artery bypass grafting Active  


 


Hematuria syndrome Active  


 


Nausea and vomiting Active  


 


Non-healing surgical wound Active  


 


Acute combined systolic and diastolic CHF, NYHA class 3 Acute  


 


Chest pain Acute  


 


Chronic Disease Management/Transitional Care Program Acute  


 


Coronary arteriosclerosis Acute  


 


Gastrointestinal bleeding, lower Acute  


 


S/P CABG x 3 Acute

## 2017-10-27 NOTE — ASMTCMCOM
CM Note

 

CM Note                       

Notes:

CM met w/ pt for dispo planning. PT is recommending SNF at this time. Pt is agreeable to going to a 


SNF. Pt reports that he has been to rehab in the past but is unable to recall the facility's 

name. CM called his wife Brandy and she reports that the facility that pt has been to in the past is 

[facility]. CM faxed referral to [facility] and [place]. Non triggering PASRR completed and faxed 

attached in allscripts. CM to follow.

 

Date Signed:  10/27/2017 04:28 PM

Electronically Signed By:DEION Rey

## 2017-10-27 NOTE — PDCARPN
Cardiology Progress Note


Assessment/Plan: 


See detailed note from 10/26.





Valvular Heart Disease- he has critical aortic stenosis with significant 

regurgitation.





Plan is for him to be evaluated for TAVR versus aortic balloon valvuloplasty in 

our valvular heart disease clinic next week.








Coronary Artery Disease- history of prior CABG and subsequent PCI of the 

proximal LIMA to LAD graft. Not experiencing any symptoms suggestive of angina.





Continue secondary prevention.








Congestive Heart Failure- secondary to mildly reduced LV systolic function, 

diastolic dysfunction, and valvular heart disease. Has bilateral small to 

moderate-sized pleural effusions.





Continue with gentle diuresis.








Cardiomyopathy- historically, his LVEF is approximately 45-50%. On an 

echocardiogram performed in the office September 20th, his LVEF was reported as 

38%. I personally reviewed the images. I agree with what the patient says Dr. Rushing told him...namely, that his LVEF visually appeared to be in the range 

of 45-50% as opposed to 38%.








Paroxysmal Atrial Fibrillation- h/o sick sinus syndrome with prior pacemaker 

implantation. Maintaining sinus rhythm.





Continues on systemic adequate anticoagulation.

















10/27/17 15:41





Subjective: 





No specific complaints.


Objective: 





 Vital Signs (8 Hrs)











  Temp Pulse Resp BP Pulse Ox


 


 10/27/17 11:42  36.5 C  75  18  111/62  97


 


 10/27/17 11:40   72   








 Intake/Output (24 Hrs)











 10/26/17 10/27/17 10/28/17





 05:59 05:59 05:59


 


Intake Total  1250 500


 


Output Total   300


 


Balance  1250 200


 


Intake:   


 


  Oral (ml)  1250 500


 


Output:   


 


  Urine (ml)   300


 


    Bedside Commode   100


 


    Urinal   200


 


Other:   


 


  Intake Quantity  Yes 





  Sufficient   


 


  Number of Voids   


 


    Incontinence   1


 


    Urinal  1 











Result Diagrams: 


 10/27/17 04:10





 10/26/17 06:04





- Physical Exam


Constitutional: no apparent distress


Eyes: anicteric sclera


Ears, Nose, Mouth, Throat: moist mucous membranes


Cardiovascular: regular rate and rhythm, systolic murmur


Respiratory: other (decreased at bases)


Gastrointestinal: normoactive bowel sounds, no masses


Skin: other (mild edema)





ICD10 Worksheet


Patient Problems: 


 Problems











Problem Status Onset


 


Coronary artery bypass grafting Active  


 


Nausea and vomiting Active  


 


Non-healing surgical wound Active  


 


Hematuria syndrome Active  


 


Gastrointestinal bleeding, lower Acute  


 


Chronic Disease Management/Transitional Care Program Acute  


 


Acute combined systolic and diastolic CHF, NYHA class 3 Acute  


 


Coronary arteriosclerosis Acute  


 


S/P CABG x 3 Acute  


 


Chest pain Acute  


 


Elevated troponin Acute  


 


Elevated bilirubin Acute

## 2017-10-28 NOTE — SOAPPROG
SOAP Progress Note


Assessment/Plan: 


Assessment:








82 y/o man with CAD s/p remote CABG and PCIs with critical AS and LVEF 40-45% 

and class III NYHA sx. He is in a tough place as lowish BP but also volume 

overloaded with wheezing and rales bilaterally. 





PLAN:


1)start Lasix 40mg IV BID if SBP > 85


2)KCL 20meq PO BID


3)TAVR evaluation in-pt this monday for probably first BAV this week.

















10/28/17 09:10





Subjective: 





CULLEN at 10ft. Wheezing. Mild dizziness with no syncope. No angina or cough or 

fevers or chills.


Objective: 





 Vital Signs











Temp Pulse Resp BP Pulse Ox


 


 36.3 C   69   13   93/47 L  100 


 


 10/28/17 08:00  10/28/17 08:00  10/28/17 08:00  10/28/17 08:00  10/28/17 08:00








 Laboratory Results





 10/27/17 04:10 





 











 10/27/17 10/28/17 10/29/17





 05:59 05:59 05:59


 


Intake Total 1250 1600 


 


Output Total  420 


 


Balance 1250 1180 








 











PT  22.0 SEC (12.0-15.0)  H D 10/28/17  03:56    


 


INR  1.91  (0.83-1.16)  H  10/28/17  03:56    














Physical Exam





- Physical Exam


General Appearance: alert


EENT: normal ENT inspection


Neck: full range of motion


Respiratory: wheezing (bilateral bases.)


Cardiac/Chest: regular rate, rhythm, gallop, JVD, systolic murmur (3/6 AS murmur

)


Peripheral Pulses: 1+: carotid (R), carotid (L), femoral (R), femoral (L), 

dorsalis-pedis (R), dorsalis-pedis (L)


Abdomen: non-tender, soft, No distended, No guarding, No ascites


Skin: warm/dry


Extremities: pedal edema (trace bilateral pretibial edema.)


Neuro/Psych: alert





ICD10 Worksheet


Patient Problems: 


 Problems











Problem Status Onset


 


Elevated bilirubin Acute  


 


Elevated troponin Acute  


 


Coronary artery bypass grafting Active  


 


Hematuria syndrome Active  


 


Nausea and vomiting Active  


 


Non-healing surgical wound Active  


 


Acute combined systolic and diastolic CHF, NYHA class 3 Acute  


 


Chest pain Acute  


 


Chronic Disease Management/Transitional Care Program Acute  


 


Coronary arteriosclerosis Acute  


 


Gastrointestinal bleeding, lower Acute  


 


S/P CABG x 3 Acute

## 2017-10-29 NOTE — SOAPPROG
SOAP Progress Note


Assessment/Plan: 


Assessment:








84 y/o man with CAD s/p remote CABG and PCIs with critical AS and LVEF 40-45% 

and class III NYHA sx. He is in a tough place as lowish BP but also volume 

overloaded with wheezing and rales bilaterally. He is slowly getting stronger. 

Now can get to bedside commode with assistance without CP or shortness of 

breath. No syncope with initiation of BID IV lasix. 





PLAN:


1)no change in current meds.


2)assess for timing of BAV by Netta Rushing and Kayla. 


3)check labs in AM (CBC, CMP, PT/INR and BNP level).











10/29/17 07:37





Subjective: 





overall maybe a little stronger than several days ago. Denies CP, PND, 

palpitations, near syncope or fevers. CULLEN at 10ft.


Objective: 





 Vital Signs











Temp Pulse Resp BP Pulse Ox


 


 36.8 C   72   16   110/60   99 


 


 10/29/17 04:00  10/29/17 04:00  10/29/17 04:00  10/29/17 04:00  10/29/17 04:00








 Laboratory Results





 10/27/17 04:10 





 10/29/17 04:30 





 











 10/28/17 10/29/17 10/30/17





 05:59 05:59 05:59


 


Intake Total 1600 550 


 


Output Total 420 850 


 


Balance 1180 -300 








 











PT  20.3 SEC (12.0-15.0)  H  10/29/17  04:30    


 


INR  1.73  (0.83-1.16)  H  10/29/17  04:30    














Physical Exam





- Physical Exam


General Appearance: thin


EENT: normal ENT inspection


Neck: full range of motion


Respiratory: lungs clear


Cardiac/Chest: regular rate, rhythm, gallop, systolic murmur


Peripheral Pulses: 1+: carotid (R), carotid (L), femoral (R), femoral (L), 

dorsalis-pedis (R), dorsalis-pedis (L)


Abdomen: non-tender, No rebound


Skin: warm/dry


Extremities: No pedal edema


Neuro/Psych: oriented x 3





ICD10 Worksheet


Patient Problems: 


 Problems











Problem Status Onset


 


Elevated bilirubin Acute  


 


Elevated troponin Acute  


 


Coronary artery bypass grafting Active  


 


Hematuria syndrome Active  


 


Nausea and vomiting Active  


 


Non-healing surgical wound Active  


 


Acute combined systolic and diastolic CHF, NYHA class 3 Acute  


 


Chest pain Acute  


 


Chronic Disease Management/Transitional Care Program Acute  


 


Coronary arteriosclerosis Acute  


 


Gastrointestinal bleeding, lower Acute  


 


S/P CABG x 3 Acute

## 2017-10-30 NOTE — SOAPPROG
SOAP Progress Note


Assessment/Plan: 


Assessment:





1. Aortic stenosis peak gradient 70 mm of mercury.


2.  Cardiomyopathy ejection fraction 40-45%.  Combination of coronary disease 

status post bypass grafting and valvular heart disease.


3. Coronary artery disease status post bypass grafting with stenting of the 

mammary artery.


4. Metastatic prostate cancer





Impression:  Patient is compensated well with aggressive diuresis over the last 

several days.  I reviewed the case with Dr. Haney any appears to be a good 

candidate for transcutaneous aortic valve replacement.  Will plan for 

dobutamine echo today for low flow aortic stenosis in the setting of reduced LV 

function.  We have compared his old echos to the 1 presently available which 

does show a reduction in ejection fraction with a stable gradient.


Last coronary angiogram was in January.  At that time mammary artery vein 

grafts were widely patent with severe native three-vessel disease.  Limited 

peripheral angiogram showed adequate access.


Plan.  Low-dose dobutamine echo today for evaluation of low-flow aortic 

stenosis.


Discharge home postprocedure.


Will need verification of 1 year survival from his prostate cancer.


Transcutaneous aortic valve evaluation tomorrow as an outpatient at 10:00 a.m..











10/30/17 10:01





Subjective: 





No shortness of breath, PND, orthopnea.  No chest pain.


Objective: 





Medications











Generic Name Dose Route Start Last Admin





  Trade Name Freq  PRN Reason Stop Dose Admin


 


Clopidogrel Bisulfate  75 mg  10/27/17 09:00  10/30/17 08:06





  Plavix  PO  04/25/18 08:59  75 mg





  DAILY SAL   


 


Digoxin  125 mcg  10/27/17 10:00  10/30/17 09:16





  Lanoxin  PO  04/25/18 09:59  125 mcg





  DAILY10 SAL   


 


Furosemide  40 mg  10/28/17 15:00  10/30/17 08:06





  Lasix Injection  IVP  04/26/18 14:59  40 mg





  BID@0900,1500 [facility]   


 


Ipratropium Bromide  2 sprays  10/26/17 12:50  





  Atrovent 0.03% Nasal  [facility]ARE  04/24/18 12:49  





  DAILY PRN   





  Dry Nose   


 


Levothyroxine Sodium  150 mcg  10/27/17 06:00  10/30/17 07:15





  Synthroid  PO  04/25/18 05:59  Not Given





  DAILY06 [facility]   


 


Rosuvastatin Calcium  5 mg  10/27/17 09:00  10/30/17 08:06





  Crestor  PO  04/25/18 08:59  5 mg





  DAILY SAL   


 


Tamsulosin HCl  0.4 mg  10/27/17 09:00  10/30/17 08:06





  Flomax  PO  04/25/18 08:59  0.4 mg





  DAILY [facility]   


 


Warfarin Sodium  1 each  10/26/17 16:45  





  Warfarin Pharmacy To Dose  [facility]  04/24/18 16:44  





  AD [facility]   





  Protocol   











 Vital Signs











Temp Pulse Resp BP Pulse Ox


 


 37.1 C   84   95 H  96/53 L  20 L


 


 10/30/17 08:00  10/30/17 09:16  10/30/17 08:00  10/30/17 08:00  10/30/17 08:00








 Laboratory Results





 10/30/17 03:56 





 10/30/17 03:56 





 











 10/29/17 10/30/17 10/31/17





 05:59 05:59 05:59


 


Intake Total 550 590 


 


Output Total 850 1600 100


 


Balance -300 -1010 -100








 











PT  24.4 SEC (12.0-15.0)  H  10/30/17  03:56    


 


INR  2.18  (0.83-1.16)  H  10/30/17  03:56    














Physical Exam





- Physical Exam


General Appearance: cachetic


Neck: full range of motion


Respiratory: lungs clear


Cardiac/Chest: regular rate, rhythm, systolic murmur


Peripheral Pulses: 1+: carotid (R), carotid (L), femoral (R), femoral (L)


Abdomen: normal bowel sounds, soft


Skin: normal color, warm/dry


Extremities: pedal edema, No calf tenderness


Neuro/Psych: no motor/sensory deficits, alert, normal mood/affect





ICD10 Worksheet


Patient Problems: 


 Problems











Problem Status Onset


 


Coronary artery bypass grafting Active  


 


Nausea and vomiting Active  


 


Non-healing surgical wound Active  


 


Hematuria syndrome Active  


 


Gastrointestinal bleeding, lower Acute  


 


Chronic Disease Management/Transitional Care Program Acute  


 


Acute combined systolic and diastolic CHF, NYHA class 3 Acute  


 


Coronary arteriosclerosis Acute  


 


S/P CABG x 3 Acute  


 


Chest pain Acute  


 


Elevated troponin Acute  


 


Elevated bilirubin Acute  














Review of Systems





- Review of Systems


Constitutional: weakness.  denies: chills, fever


EENTM: no symptoms reported


Respiratory: no symptoms reported


Cardiac: no symptoms reported


Gastrointestinal/Abdominal: no symptoms reported


Genitourinary: no symptoms


Musculoskelatal: no symptoms


Skin: no symptoms


Neurological: no symptoms

## 2017-10-30 NOTE — ECHO
[email or web address]





[address] [address] 

Main: [phone] 



Fax: 



Transthoracic Echocardiogram 

Name:            FAINA EDWARDS                    MR#:

[id number]

Study Date:      10/30/2017                          Study Time:

10:52 AM

YOB: 1934                          Age:           83

year(s)

Height:            ( )                               Weight:

( )

BSA:                                                 Gender:

Male

Examination:     DSE                                 Indication:

Dobutmine Echo to evaluate AS gradient

Image Quality:                                       Contrast: 

Requested by:    Ruddy Taylor                BP:

103 mmHg/66 mmHg

Heart Rate:                                          Rhythm: 

Indication:      Dobutmine Echo to evaluate AS gradient 



Procedure Staff 

Ultrasound Technician: 

Nydia Physician: 

Requesting Provider: 

Ultrasound Technician:   Alexandra Bowers 

Reading Physician:       Federico Rushing 

Requesting Provider: 



Conclusions:          Baseline EF of 45% with peak gradient of 60 mmHg

and a mean of 44 mmHg. With adminastration

of dobutamine 

increase in EF to 55% with increase in aortic gradient to 79 mmHg with

a mean of 53 mm hg.



Measurements: 

Chambers                   Valvular Assessment AV/MV          Valvular

Assessment TV/PV



Normal                                Normal

Normal

Name         Value   Range             Name        Value Range

Name          Value Range

LVOTd        2.5 cm  2.5 cm mm             AV Vmax:    4.44 m/s (1

m/s-1.7



m/s)  

AV maxP mmHg ( - )  

AV meanP mmHg ( - )  

MERI (VTI):  0.7 cm ( - )  

AR (PHT):   359 ms ( - )  



Continued Measurements: 

Valvular Assessment AV/MV  



Name                   Value  

AR Vmax:             3.76 cm/s   



Findings: 

Exam Comments: 

Rest: Estimated LV ejection fraction 35-40%. Basal inferior and

inferoseptal hypokinesis. AV



Patient: FAINA EDWARDS                   MRN: [id number]

Study Date: 10/30/2017   Page 1 of 2

10:52 AM 









gradient: 3.6 m/s, 38/52 mmHg mean/max PG and MERI=.9 cm2. 5mcs: AV

gradient 3.9m/s,

44/60mmHg mean/max PG and MERI=.83cm2. 10mcs: AV gradient 4.4m/s,

53/79mmHg mean/max

PG and MERI=.74cm2. 10mcs Estimated LV EF 50%. Basal inferior and

inferoseptal walls remained

hypokinetic. 







Electronically signed by Federico Rushing on 10/30/2017 at 02:28 PM 

(No Signature Object) 



Patient: FAINA EDWARDS                   MRN: [id number]

Study Date: 10/30/2017   Page 2 of 2

10:52 AM 







[id number]

## 2017-10-31 NOTE — PDDCSUM
Discharge Summary


Discharge Summary: 





DISCHARGE DIAGNOSES:


-acute systolic congestive heart failure with known cardiomyopathy


-severe aortic stenosis, asymptomatic


-history of coronary artery disease status post bypass surgery


-history of AFib


-history of recurrent pulmonary emboli


-chronic anticoagulation


-known prostate cancer


-chronic gait instability with the severe degenerative disease of the hip and 

some imbalance





CONSULTANTS:


Dr. Julio vásquez and Federico Rushing





PROCEDURES:


Stress echocardiogram


CT angiograms of the chest and abdomen





HOSPITAL COURSE SUMMARY:


This patient who has known heart disease came in with acute heart failure.  He 

has severe aortic stenosis and cardiomyopathy with previous bypass surgery.  

Because of his aortic stenosis we had to treat him very cautiously.  His 

medicine changes did include discontinuation of Coreg and Aldactone at this 

time.  He was treated with Lasix and had overall good diuresis.  He has been 

mildly hypotensive throughout his hospital stay but is fortunately tolerated 

that reasonably well.  At this point he is felt to be maximally optimized 

medically given the degree of aortic stenosis.  He is therefore stable to be 

discharged.  However with his aortic stenosis, hypotension and other issues, it 

is felt that he may be a possible candidate for TAVR procedure.  To that and he 

did undergo a stress echocardiogram and CT angiograms of the chest and abdomen 

during this hospital stay.  After discharge today he has an appointment with 

Dr. Chaim samuels to review his condition and issues and to review his risk 

benefits of TAVR.  As he does have prostate cancer the need to look at his 

cancer related life expectancy is well.





Patient does have some significant gait instability here at least partly 

related to his hypotension but also part related to chronic severe degenerative 

problems and 1 of his hips.  Because of this he was not felt stable for sending 

home and is being transferred from here to skilled nursing facility.  He will 

go there today after his appoint with Dr. Kuhn.





 


PENDING TEST RESULTS:  


The result of his CT angiograms are still pending at this time





MEDICATION CHANGES:


Discontinuation of Coreg and Aldactone


Protonix increased to twice daily use and Lasix increased to twice daily as well








Greater than 35 minutes bedside and care coordination time today

## 2017-10-31 NOTE — PDIAF
- Diagnosis


Diagnosis: Gait instability, aortic stenosis sev, DJD hip


Code Status: Full Code





- Medication Management


Discharge Medications: 


 Medications to Continue on Transfer





Tamsulosin HCl [Flomax 0.4 MG (*)] 0.4 mg PO DAILY 03/13/12 [Last Taken 10/24/17

]


Levothyroxine [Synthroid 150 mcg (*)] 150 mcg PO DAILY06 12/30/13 [Last Taken 10

/24/17]


Acyclovir [Zovirax 400 mg (*)] 400 mg PO BID PRN 01/24/17 [Last Taken 03/24/17]


Digoxin [Lanoxin 250 mcg (RX)] 125 mcg PO DAILY10 01/24/17 [Last Taken 10/24/17]


Nitroglycerin [Nitrostat 0.4 mg (*)] 0.4 mg SL Q5M PRN 01/24/17 [Last Taken 03/ 24/17]


Rosuvastatin Calcium [Crestor 5mg] 5 mg PO DAILY 01/24/17 [Last Taken 10/24/17]


Warfarin Sodium [Coumadin 5MG (*)] 5 mg PO DAILY@09 01/24/17 [Last Taken 10/23/

17]


Clopidogrel Bisulfate [Plavix (*)] 75 mg PO DAILY 03/25/17 [Last Taken 10/24/17]


Enzalutamide [Xtandi] 80 mg PO DAILY 10/26/17 [Last Taken 10/24/17]


Ipratropium 0.03% Nasal [Atrovent 0.03% Nasal (*)] 2 sprays EACHNARE DAILY PRN 

10/26/17 [Last Taken Unknown]


Acetaminophen [Tylenol 325mg (*)] 650 mg PO Q4HRS PRN  tab 10/31/17 [Last Taken 

Unknown]


Furosemide [Lasix 40 MG (*)] 40 mg PO BID@0900,1500  tab 10/31/17 [Last Taken 

Unknown]


Pantoprazole Sodium [Protonix 40mg (*)] 40 mg PO BID  tab 10/31/17 [Last Taken 

Unknown]


Potassium Chloride Po [Potassium Chloride 20 mg/15 ml (*)] 20 meq PO BID  udcup 

10/31/17 [Last Taken Unknown]








Discharge Medications: Refer to the Discharge Home Medication list for PRN 

reason.





- Orders


Services needed: Registered Nurse, Certified Nursing Aide, Physical Therapy, 

Occupational Therapy, Speech Language Pathologist


Diet Recommendation: no restrictions on diet


Diet Texture: Regular Texture Diet


Weigh Patient: daily


Activity/Weight Bearing Restrictions: FALL RISK





- Follow Up Care


Current Providers and Referrals: 


Michael Prado MD [Primary Care Provider] - As per Instructions

## 2017-10-31 NOTE — ASMTCMCOM
CM Note

 

CM Note                       

Notes:

10/31/2017 Case Management Note



Pt d/c today.  Wife and pt to appointment w/Dr. Ordonez for evaluation of TAVR procedure at 10 am 

@ [facility].  Wife to transport pt to [facility] after appointment.



Case Management faxed all d/c paperwork to [facility] and notified of pending arrival.

 

Date Signed:  10/31/2017 09:59 AM

Electronically Signed By:Juana Beauchamp RN

## 2017-10-31 NOTE — ASDISCHSUM
----------------------------------------------

Discharge Information

----------------------------------------------

Plan Status:SNF                                      Medically Cleared to Leave:10/31/2017

Discharge Date:10/31/2017 09:49 AM                    D/C Disposition:Skilled Nursing Facility

ADT D/C Disposition:Home, Routine, Self-Care         Projected Discharge Date:10/30/2017 12:00 AM

Transportation at D/C:Family                         Discharge Delay Reason:

Follow-Up Date:10/30/2017 12:00 AM                   Discharge Slot:

Final Diagnosis:

----------------------------------------------

Placement Information

----------------------------------------------

Referral Type:*Nursing Home/SNF                      Referral ID:[id number]

Provider Name:[facility]

Address 1:[address]                    Phone [phone]

Address 2:                                           Fax [phone]

City:[place]                                      Selection Factors:

State:[place]

 

----------------------------------------------

Patient Contact Information

----------------------------------------------

Contact Name:SHERRIE                           Relationship:Wife

Address:[address]                            Home [phone]

                                                     Work [phone]

City:[place]                                         Alternate Phone:

State/Zip Code:[place] [address]                              Email:

----------------------------------------------

Financial Information

----------------------------------------------

Financial Class:

Primary Plan Desc:MEDICARE INPATIENT                 Primary Plan Number:[id number]

Secondary Plan Desc:AARP/MDR SUPPLEMENT              Secondary Plan Number:[id number]

 

 

----------------------------------------------

Assessment Information

----------------------------------------------

----------------------------------------------

[facility] Initial CM Assessment

----------------------------------------------

Living Arrangements

 

What is your living           Answers:  With Spouse                           

arrangement? Who do you                                                       

live with?                                                                    

Type Of Residence

 

What kind of residence do     Answers:  House                                 

you live in?                                                                  

Discharge Plan Comments

 

Coordination Status           

Comments                      

Notes:

Pt is a 84 y/o man admitted w/ weakness, elevated bilirubin, and elevated trop. PT and OT have been 


ordered and awaiting recommendations. Needs are TBD at this time. CM to follow.

 

Date Signed:  10/26/2017 04:31 PM

Electronically Signed By:DEION Rey

 

 

----------------------------------------------

[facility] CM Progress Note

----------------------------------------------

CM Note

 

CM Note                       

Notes:

CM met w/ pt for dispo planning. PT is recommending SNF at this time. Pt is agreeable to going to a 


SNF. Pt reports that he has been to rehab in the past but is unable to recall the facility's 

name. CM called his wife Brandy and she reports that the facility that pt has been to in the past is 

[facility]. CM faxed referral to [facility] and [place]. Non triggering PASRR completed and faxed 

attached in allscripts. CM to follow.

 

Date Signed:  10/27/2017 04:28 PM

Electronically Signed By:DEION Rey

 

 

----------------------------------------------

[facility] Progress Note

----------------------------------------------

CM Note

 

CM Note                       

Notes:

10/31/2017 Case Management Note



Pt d/c today.  Wife and pt to appointment w/Dr. Ordonez for evaluation of TAVR procedure at 10 am 

@ [facility].  Wife to transport pt to [facility] after appointment.



Case Management faxed all d/c paperwork to [facility] and notified of pending arrival.

 

Date Signed:  10/31/2017 09:59 AM

Electronically Signed By:Juana Beauchamp RN

 

 

----------------------------------------------

Intervention Information

----------------------------------------------

Intervention Type:*BUSBY-Signed                       Date of Service:10/26/2017 10:21 AM

Patient Type:Observation                             Staff Member:Liliam Castanon

Hours:                                               Discipline:

Severity:                                            Comment:

## 2017-11-06 NOTE — CPIP
[f rep st]



                                                       INVASIVE CARDIAC PROCEDURE





DATE OF PROCEDURE:  11/06/2017



INDICATIONS FOR PROCEDURE:  

1.  Critical aortic stenosis.  

2.  Heart failure.



PROCEDURE:  

1.  Nonselective left coronary sheathogram.

2.  7-Russian sheath, left common femoral vein.

3.  Right heart catheterization with [place]-James catheter.

4.  Bilateral selective coronary angiography.

5.  Saphenous vein angiography to the marginal 1 artery.

6.  Saphenous vein angiography to right posterior descending artery.

7.  Left internal mammary artery angiography to the left anterior descending.

8.  Abdominal aortogram.



BRIEF HISTORY:  The patient is an 83-year-old male with history of multiple comorbid issues, includin
g prior bypass surgery and severe aortic stenosis.  The patient has been having worsening episodes of
 heart failure, necessitating hospitalization.  The patient had been evaluated by CT surgery, by Dr. Chaim Ordonez, who deemed the patient to be an extreme high-risk candidate for open AVR.  Given his fin
dings, the patient consented for right and left heart catheterization in anticipation for eventual TA
VR.



DESCRIPTION OF PROCEDURE:  After informed consent, the patient was brought to [facility], where the left stef
in was prepped and draped in a sterile fashion with lidocaine.  A short 6-Russian sheath was introduce
d into the left common femoral artery and verified angiographically.  A 7-Russian sheath was placed in
 the left common femoral vein.  [place]-Ajmes catheter was then advanced.  RA pressure was measured to be
 13/27, with a mean of 16.  RV pressures measuring 55/4.  Wedge pressure of 30.  PA pressure measured
 to be 52/17, with a mean of 30.  Cardiac output was measured to be 2.38 by Izabella, with a cardiac inde
x of 1.3.  AO sat was measured to be 98.9 and a PA sat was measured to be 46.3.  The [place]-James cathet
er was then removed.  A JL4 catheter was advanced to the left coronary artery.  Images of the left co
ronary artery revealed approximately 70% stenosis in the proximal left main.  Distally, the circumfle
x system appeared to be 100% occluded.  There was an LAD that had a septal  coming off prox
imally, and it terminated to a diagonal artery.  Of note, there was competitive filling of the LAD, m
ost likely via a bypass graft.  After the procedure, the JL4 catheter was removed.  The JR4 catheter 
was then advanced to the right coronary artery.  Images of the right coronary artery revealed normal 
ostial RCA.  There was 30% proximal RCA disease, and another focal area 40% to 50% disease in the mid
 distal RCA.  There was an occluded RPDA but native antegrade flow into the RPLS, which appeared to h
ave mild-to-moderate disease but no high-grade stenosis.  A JR4 catheter did manage to cross the aort
ic valve, and LVEDP was obtained of 25 mmHg.  No ventriculogram was obtained due to the patient's und
erlying heart failure.  However, there was a significant 35 mm gradient between the LV and the aorta,
 most likely underrepresented secondary to the patient's severely reduced cardiac output.  __________
 images obtained, the JR4 catheter was pulled back into the first graft, which was a saphenous vein g
raft to a marginal artery.  This was widely patent.  The catheter was then pulled back and placed in 
the left subclavian artery, switched out for exchange length wire and a left internal mammary artery 
catheter.  The LIMA catheter was injected, which then showed widely patent LIMA, with a stent in its 
proximal aspect, with mild in-stent restenosis.  The LIMA anastomosed to the LAD and was healthy and 
free of disease.  This catheter was then pulled back and removed and switched out for a multipurpose 
catheter.  The multipurpose catheter was placed into the saphenous vein graft, which was anastomosed 
into the RPDA.  This was widely patent and free of disease.  The RPDA had mild disease but no high-gr
rigo obstruction.  The catheter was then removed, and a pigtail catheter was advanced to the descendin
g aorta, where abdominal aortogram was obtained, which showed patent ascending aorta, patent bilatera
l common external and internal iliac arteries, patent bilateral common femoral arteries.  The pigtail
 catheter was then removed over the 0.035 wire.  Left groin and sheath were sutured into place.  The 
patient was administered 3000 heparin at the beginning of the procedure.  The patient tolerated proce
dure well.  No complications.



IMPRESSION:  

1.  Severe native coronary artery disease.  

2.  Patent 3 out of 3 bypass grafts.  

3.  Moderate pulmonary hypertension.  

4.  Severely reduced cardiac output of 2.3.



PLAN:  The patient will be discharged later this morning.  He will be scheduled for TAVR within the [facility].  Of note, the patient did have a dobutamine stress echo while being admitted to the hospital las
t week, which showed a significant increase in his baseline gradients with minimal dobutamine infusio
n, indicating the severity of his underlying aortic stenosis.  Further orders following clinical cour
se.





Job #:  [id number]

## 2017-11-06 NOTE — PDIAF
- Diagnosis


Code Status: Full Code





- Medication Management


Discharge Medications: 


 Medications to Continue on Transfer





Tamsulosin HCl [Flomax 0.4 MG (*)] 0.4 mg PO DAILY 03/13/12 [Last Taken 10/24/17

]


Levothyroxine [Synthroid 150 mcg (*)] 150 mcg PO DAILY06 12/30/13 [Last Taken 10

/24/17]


Acyclovir [Zovirax 400 mg (*)] 400 mg PO DAILY PRN 01/24/17 [Last Taken 03/24/17

]


Digoxin [Lanoxin 250 mcg (RX)] 125 mcg PO DAILY10 01/24/17 [Last Taken 10/24/17]


Nitroglycerin [Nitrostat 0.4 mg (*)] 0.4 mg SL Q5M PRN 01/24/17 [Last Taken 03/ 24/17]


Warfarin Sodium [Coumadin 5MG (*)] 5 mg PO SUMOWETHSA@16 01/24/17 [Last Taken 10

/23/17]


Clopidogrel Bisulfate [Plavix (*)] 75 mg PO DAILY 03/25/17 [Last Taken 10/24/17]


Enzalutamide [Xtandi] 80 mg PO DAILY 10/26/17 [Last Taken 10/24/17]


Ipratropium 0.03% Nasal [Atrovent 0.03% Nasal (*)] 2 sprays EACHNARE DAILY PRN 

10/26/17 [Last Taken Unknown]


Acetaminophen [Tylenol 325mg (*)] 650 mg PO Q4HRS PRN  tab 10/31/17 [Last Taken 

Unknown]


Pantoprazole Sodium [Protonix 40mg (*)] 40 mg PO BID  tab 10/31/17 [Last Taken 

Unknown]


Potassium Chloride Po [Potassium Chloride 20 mg/15 ml (*)] 20 meq PO BID  udcup 

10/31/17 [Last Taken Unknown]


Ascorbic Acid [Vitamin C 500 mg (*)] 1,000 mg PO DAILY 11/03/17 [Last Taken 

Unknown]


Cholecalciferol Vit D3 [Vitamin D3 (*)] 1,000 units PO DAILY 11/03/17 [Last 

Taken Unknown]


Furosemide [Lasix 40 MG (*)] 40 mg PO DAILY 11/03/17 [Last Taken Unknown]


Herbals/Supplements -Info Only 1 ea PO DAILY 11/03/17 [Last Taken Unknown]


Rosuvastatin Calcium [Crestor] 10 mg PO DAILY 11/03/17 [Last Taken Unknown]


Warfarin Sodium [Coumadin 7.5MG (*)] 7.5 mg PO TUFR@16 11/03/17 [Last Taken 

Unknown]








Discharge Medications: Refer to the Discharge Home Medication list for PRN 

reason.





- Orders


Diet Recommendation: sodium restricted


Diet Texture: Regular Texture Diet





- Follow Up Care


Current Providers and Referrals: 


Michael Prado MD [Primary Care Provider] - 


Markus Haney MD [Medical Doctor] -  (pt to f/u 11/13 for TAVR at [facility])

## 2017-11-06 NOTE — PDIAF
- Diagnosis


Code Status: Full Code





- Medication Management


Discharge Medications: 


 Medications to Continue on Transfer





Tamsulosin HCl [Flomax 0.4 MG (*)] 0.4 mg PO DAILY 03/13/12 [Last Taken 10/24/17

]


Levothyroxine [Synthroid 150 mcg (*)] 150 mcg PO DAILY06 12/30/13 [Last Taken 10

/24/17]


Acyclovir [Zovirax 400 mg (*)] 400 mg PO DAILY PRN 01/24/17 [Last Taken 03/24/17

]


Digoxin [Lanoxin 250 mcg (RX)] 125 mcg PO DAILY10 01/24/17 [Last Taken 10/24/17]


Nitroglycerin [Nitrostat 0.4 mg (*)] 0.4 mg SL Q5M PRN 01/24/17 [Last Taken 03/ 24/17]


Warfarin Sodium [Coumadin 5MG (*)] 5 mg PO SUMOWETHSA@16 01/24/17 [Last Taken 10

/23/17]


Clopidogrel Bisulfate [Plavix (*)] 75 mg PO DAILY 03/25/17 [Last Taken 10/24/17]


Enzalutamide [Xtandi] 80 mg PO DAILY 10/26/17 [Last Taken 10/24/17]


Ipratropium 0.03% Nasal [Atrovent 0.03% Nasal (*)] 2 sprays EACHNARE DAILY PRN 

10/26/17 [Last Taken Unknown]


Acetaminophen [Tylenol 325mg (*)] 650 mg PO Q4HRS PRN  tab 10/31/17 [Last Taken 

Unknown]


Pantoprazole Sodium [Protonix 40mg (*)] 40 mg PO BID  tab 10/31/17 [Last Taken 

Unknown]


Potassium Chloride Po [Potassium Chloride 20 mg/15 ml (*)] 20 meq PO BID  udcup 

10/31/17 [Last Taken Unknown]


Ascorbic Acid [Vitamin C 500 mg (*)] 1,000 mg PO DAILY 11/03/17 [Last Taken 

Unknown]


Cholecalciferol Vit D3 [Vitamin D3 (*)] 1,000 units PO DAILY 11/03/17 [Last 

Taken Unknown]


Furosemide [Lasix 40 MG (*)] 40 mg PO DAILY 11/03/17 [Last Taken Unknown]


Herbals/Supplements -Info Only 1 ea PO DAILY 11/03/17 [Last Taken Unknown]


Rosuvastatin Calcium [Crestor] 10 mg PO DAILY 11/03/17 [Last Taken Unknown]


Warfarin Sodium [Coumadin 7.5MG (*)] 7.5 mg PO TUFR@16 11/03/17 [Last Taken 

Unknown]








Discharge Medications: Refer to the Discharge Home Medication list for PRN 

reason.





- Orders


Diet Recommendation: sodium restricted


Diet Texture: Regular Texture Diet





- Follow Up Care


Current Providers and Referrals: 


Michael Prado MD [Primary Care Provider] - 


Markus Haney MD [Medical Doctor] -  (pt to f/u 11/13 for TAVR at [facility])

## 2017-11-06 NOTE — CPIP
[f rep st]



                                                       INVASIVE CARDIAC PROCEDURE





DATE OF PROCEDURE:  11/06/2017



INDICATIONS FOR PROCEDURE:  

1.  Critical aortic stenosis.  

2.  Heart failure.



PROCEDURE:  

1.  Nonselective left coronary sheathogram.

2.  7-[place] citizen sheath, left common femoral vein.

3.  Right heart catheterization with [place]-James catheter.

4.  Bilateral selective coronary angiography.

5.  Saphenous vein angiography to the marginal 1 artery.

6.  Saphenous vein angiography to right posterior descending artery.

7.  Left internal mammary artery angiography to the left anterior descending.

8.  Abdominal aortogram.



BRIEF HISTORY:  The patient is an 83-year-old male with history of multiple comorbid issues, includin
g prior bypass surgery and severe aortic stenosis.  The patient has been having worsening episodes of
 heart failure, necessitating hospitalization.  The patient had been evaluated by CT surgery, by Dr. Chaim Ordonez, who deemed the patient to be an extreme high-risk candidate for open AVR.  Given his fin
dings, the patient consented for right and left heart catheterization in anticipation for eventual TA
VR.



DESCRIPTION OF PROCEDURE:  After informed consent, the patient was brought to [facility], where the left stef
in was prepped and draped in a sterile fashion with lidocaine.  A short 6-[place]chten[place] citizen sheath was introduce
d into the left common femoral artery and verified angiographically.  A 7-[place] citizen sheath was placed in
 the left common femoral vein.  [place]-Jmaes catheter was then advanced.  RA pressure was measured to be
 13/27, with a mean of 16.  RV pressures measuring 55/4.  Wedge pressure of 30.  PA pressure measured
 to be 52/17, with a mean of 30.  Cardiac output was measured to be 2.38 by Izabella, with a cardiac inde
x of 1.3.  AO sat was measured to be 98.9 and a PA sat was measured to be 46.3.  The [place]-James cathet
er was then removed.  A JL4 catheter was advanced to the left coronary artery.  Images of the left co
ronary artery revealed approximately 70% stenosis in the proximal left main.  Distally, the circumfle
x system appeared to be 100% occluded.  There was an LAD that had a septal  coming off prox
imally, and it terminated to a diagonal artery.  Of note, there was competitive filling of the LAD, m
ost likely via a bypass graft.  After the procedure, the JL4 catheter was removed.  The JR4 catheter 
was then advanced to the right coronary artery.  Images of the right coronary artery revealed normal 
ostial RCA.  There was 30% proximal RCA disease, and another focal area 40% to 50% disease in the mid
 distal RCA.  There was an occluded RPDA but native antegrade flow into the RPLS, which appeared to h
ave mild-to-moderate disease but no high-grade stenosis.  A JR4 catheter did manage to cross the aort
ic valve, and LVEDP was obtained of 25 mmHg.  No ventriculogram was obtained due to the patient's und
erlying heart failure.  However, there was a significant 35 mm gradient between the LV and the aorta,
 most likely underrepresented secondary to the patient's severely reduced cardiac output.  __________
 images obtained, the JR4 catheter was pulled back into the first graft, which was a saphenous vein g
raft to a marginal artery.  This was widely patent.  The catheter was then pulled back and placed in 
the left subclavian artery, switched out for exchange length wire and a left internal mammary artery 
catheter.  The LIMA catheter was injected, which then showed widely patent LIMA, with a stent in its 
proximal aspect, with mild in-stent restenosis.  The LIMA anastomosed to the LAD and was healthy and 
free of disease.  This catheter was then pulled back and removed and switched out for a multipurpose 
catheter.  The multipurpose catheter was placed into the saphenous vein graft, which was anastomosed 
into the RPDA.  This was widely patent and free of disease.  The RPDA had mild disease but no high-gr
rigo obstruction.  The catheter was then removed, and a pigtail catheter was advanced to the descendin
g aorta, where abdominal aortogram was obtained, which showed patent ascending aorta, patent bilatera
l common external and internal iliac arteries, patent bilateral common femoral arteries.  The pigtail
 catheter was then removed over the 0.035 wire.  Left groin and sheath were sutured into place.  The 
patient was administered 3000 heparin at the beginning of the procedure.  The patient tolerated proce
dure well.  No complications.



IMPRESSION:  

1.  Severe native coronary artery disease.  

2.  Patent 3 out of 3 bypass grafts.  

3.  Moderate pulmonary hypertension.  

4.  Severely reduced cardiac output of 2.3.



PLAN:  The patient will be discharged later this morning.  He will be scheduled for TAVR within the [facility].  Of note, the patient did have a dobutamine stress echo while being admitted to the hospital las
t week, which showed a significant increase in his baseline gradients with minimal dobutamine infusio
n, indicating the severity of his underlying aortic stenosis.  Further orders following clinical cour
se.





Job #:  [id number]

## 2017-11-06 NOTE — CPIP
[f rep st]



                                                       INVASIVE CARDIAC PROCEDURE





DATE OF PROCEDURE:  11/06/2017



INDICATIONS FOR PROCEDURE:  

1.  Critical aortic stenosis.  

2.  Heart failure.



PROCEDURE:  

1.  Nonselective left coronary sheathogram.

2.  7-Ukrainian sheath, left common femoral vein.

3.  Right heart catheterization with Clifton-James catheter.

4.  Bilateral selective coronary angiography.

5.  Saphenous vein angiography to the marginal 1 artery.

6.  Saphenous vein angiography to right posterior descending artery.

7.  Left internal mammary artery angiography to the left anterior descending.

8.  Abdominal aortogram.



BRIEF HISTORY:  The patient is an 83-year-old male with history of multiple comorbid issues, includin
g prior bypass surgery and severe aortic stenosis.  The patient has been having worsening episodes of
 heart failure, necessitating hospitalization.  The patient had been evaluated by CT surgery, by Dr. Chaim Ordonez, who deemed the patient to be an extreme high-risk candidate for open AVR.  Given his fin
dings, the patient consented for right and left heart catheterization in anticipation for eventual TA
VR.



DESCRIPTION OF PROCEDURE:  After informed consent, the patient was brought to [facility], where the left stef
in was prepped and draped in a sterile fashion with lidocaine.  A short 6-Ukrainian sheath was introduce
d into the left common femoral artery and verified angiographically.  A 7-Ukrainian sheath was placed in
 the left common femoral vein.  Clifton-James catheter was then advanced.  RA pressure was measured to be
 13/27, with a mean of 16.  RV pressures measuring 55/4.  Wedge pressure of 30.  PA pressure measured
 to be 52/17, with a mean of 30.  Cardiac output was measured to be 2.38 by Izabella, with a cardiac inde
x of 1.3.  AO sat was measured to be 98.9 and a PA sat was measured to be 46.3.  The Clifton-James cathet
er was then removed.  A JL4 catheter was advanced to the left coronary artery.  Images of the left co
ronary artery revealed approximately 70% stenosis in the proximal left main.  Distally, the circumfle
x system appeared to be 100% occluded.  There was an LAD that had a septal  coming off prox
imally, and it terminated to a diagonal artery.  Of note, there was competitive filling of the LAD, m
ost likely via a bypass graft.  After the procedure, the JL4 catheter was removed.  The JR4 catheter 
was then advanced to the right coronary artery.  Images of the right coronary artery revealed normal 
ostial RCA.  There was 30% proximal RCA disease, and another focal area 40% to 50% disease in the mid
 distal RCA.  There was an occluded RPDA but native antegrade flow into the RPLS, which appeared to h
ave mild-to-moderate disease but no high-grade stenosis.  A JR4 catheter did manage to cross the aort
ic valve, and LVEDP was obtained of 25 mmHg.  No ventriculogram was obtained due to the patient's und
erlying heart failure.  However, there was a significant 35 mm gradient between the LV and the aorta,
 most likely underrepresented secondary to the patient's severely reduced cardiac output.  __________
 images obtained, the JR4 catheter was pulled back into the first graft, which was a saphenous vein g
raft to a marginal artery.  This was widely patent.  The catheter was then pulled back and placed in 
the left subclavian artery, switched out for exchange length wire and a left internal mammary artery 
catheter.  The LIMA catheter was injected, which then showed widely patent LIMA, with a stent in its 
proximal aspect, with mild in-stent restenosis.  The LIMA anastomosed to the LAD and was healthy and 
free of disease.  This catheter was then pulled back and removed and switched out for a multipurpose 
catheter.  The multipurpose catheter was placed into the saphenous vein graft, which was anastomosed 
into the RPDA.  This was widely patent and free of disease.  The RPDA had mild disease but no high-gr
rigo obstruction.  The catheter was then removed, and a pigtail catheter was advanced to the descendin
g aorta, where abdominal aortogram was obtained, which showed patent ascending aorta, patent bilatera
l common external and internal iliac arteries, patent bilateral common femoral arteries.  The pigtail
 catheter was then removed over the 0.035 wire.  Left groin and sheath were sutured into place.  The 
patient was administered 3000 heparin at the beginning of the procedure.  The patient tolerated proce
dure well.  No complications.



IMPRESSION:  

1.  Severe native coronary artery disease.  

2.  Patent 3 out of 3 bypass grafts.  

3.  Moderate pulmonary hypertension.  

4.  Severely reduced cardiac output of 2.3.



PLAN:  The patient will be discharged later this morning.  He will be scheduled for TAVR within the [facility].  Of note, the patient did have a dobutamine stress echo while being admitted to the hospital las
t week, which showed a significant increase in his baseline gradients with minimal dobutamine infusio
n, indicating the severity of his underlying aortic stenosis.  Further orders following clinical cour
se.





Job #:  [id number]

## 2017-11-06 NOTE — PDIAF
- Diagnosis


Code Status: Full Code





- Medication Management


Discharge Medications: 


 Medications to Continue on Transfer





Tamsulosin HCl [Flomax 0.4 MG (*)] 0.4 mg PO DAILY 03/13/12 [Last Taken 10/24/17

]


Levothyroxine [Synthroid 150 mcg (*)] 150 mcg PO DAILY06 12/30/13 [Last Taken 10

/24/17]


Acyclovir [Zovirax 400 mg (*)] 400 mg PO DAILY PRN 01/24/17 [Last Taken 03/24/17

]


Digoxin [Lanoxin 250 mcg (RX)] 125 mcg PO DAILY10 01/24/17 [Last Taken 10/24/17]


Nitroglycerin [Nitrostat 0.4 mg (*)] 0.4 mg SL Q5M PRN 01/24/17 [Last Taken 03/ 24/17]


Warfarin Sodium [Coumadin 5MG (*)] 5 mg PO SUMOWETHSA@16 01/24/17 [Last Taken 10

/23/17]


Clopidogrel Bisulfate [Plavix (*)] 75 mg PO DAILY 03/25/17 [Last Taken 10/24/17]


Enzalutamide [Xtandi] 80 mg PO DAILY 10/26/17 [Last Taken 10/24/17]


Ipratropium 0.03% Nasal [Atrovent 0.03% Nasal (*)] 2 sprays EACHNARE DAILY PRN 

10/26/17 [Last Taken Unknown]


Acetaminophen [Tylenol 325mg (*)] 650 mg PO Q4HRS PRN  tab 10/31/17 [Last Taken 

Unknown]


Pantoprazole Sodium [Protonix 40mg (*)] 40 mg PO BID  tab 10/31/17 [Last Taken 

Unknown]


Potassium Chloride Po [Potassium Chloride 20 mg/15 ml (*)] 20 meq PO BID  udcup 

10/31/17 [Last Taken Unknown]


Ascorbic Acid [Vitamin C 500 mg (*)] 1,000 mg PO DAILY 11/03/17 [Last Taken 

Unknown]


Cholecalciferol Vit D3 [Vitamin D3 (*)] 1,000 units PO DAILY 11/03/17 [Last 

Taken Unknown]


Furosemide [Lasix 40 MG (*)] 40 mg PO DAILY 11/03/17 [Last Taken Unknown]


Herbals/Supplements -Info Only 1 ea PO DAILY 11/03/17 [Last Taken Unknown]


Rosuvastatin Calcium [Crestor] 10 mg PO DAILY 11/03/17 [Last Taken Unknown]


Warfarin Sodium [Coumadin 7.5MG (*)] 7.5 mg PO TUFR@16 11/03/17 [Last Taken 

Unknown]








Discharge Medications: Refer to the Discharge Home Medication list for PRN 

reason.





- Orders


Diet Recommendation: sodium restricted


Diet Texture: Regular Texture Diet





- Follow Up Care


Current Providers and Referrals: 


Michael Prado MD [Primary Care Provider] - 


Markus Haney MD [Medical Doctor] -  (pt to f/u 11/13 for TAVR at [facility])

## 2017-11-06 NOTE — CPEKG
Heart Rate: 78

RR Interval: 769

P-R Interval: 234

QRSD Interval: 172

QT Interval: 444

QTC Interval: 506

P Axis: -24

QRS Axis: -72

T Wave Axis: -8

EKG Severity - ABNORMAL ECG -

EKG Impression: SINUS RHYTHM

EKG Impression: FIRST DEGREE AV BLOCK

EKG Impression: RBBB AND LAFB

Electronically Signed By: Corby Gomez 07-Nov-2017 10:39:04

## 2017-11-06 NOTE — PDHPUP
History & Physical Update


H&P update statement: 


This history and physical update is based on an assessment of the patient which 

was completed after admission or registration (within 24 hours), but prior to 

the surgery/procedure.





H&P update: no change in patient's condition since H&P completed

## 2017-11-06 NOTE — PDPROPOC
Sedation Plan of Care


ASA Classification: ASA 2


Planned drugs: fentanyl, midazolam


Mallampati Score: Class 2


Mallampati Reference Image: 





Patient passed 3-3-2 rule?: Yes

## 2017-11-13 NOTE — GCON
[f rep st]



                                                                    CONSULTATION





INTENSIVIST CONSULTATION.  PATIENT EXAMINED POSTOPERATIVELY AFTER RECEIVING A TAVR.





HISTORY OF PRESENT ILLNESS:  The patient is an 83-year-old white male with an extensive past medical 
history including congestive heart failure, anxiety, aortic stenosis, atrial fibrillation, coronary a
rtery disease, depression, factor V Leiden deficiency, gastroesophageal reflux disease, hyperlipidemi
a, hypothyroidism.  Again, he is examined postoperatively after receiving TAVR.  He is currently some
what sleepy, but appears to be stable.  All history is gleaned from the medical record.



PAST MEDICAL HISTORY:  As above, with the additions of rheumatoid arthritis and vertigo.



PAST SURGICAL HISTORY:  Coronary bypass graft, hernia repair, pacemaker implantation, and left lower 
lobectomy in 1997.



MEDICATIONS:  At home include acyclovir, Aldactone, aspirin, clopidogrel, Coreg, Crestor, digoxin, Fl
onase, Lasix, nitroglycerin, oxygen, Protonix, Synthroid, tamsulosin, temazepam, vancomycin, Coumadin
, and Xtandi.



SOCIAL HISTORY:  Infrequent alcohol use, extensive tobacco use.



PHYSICAL EXAMINATION:  VITAL SIGNS:  Blood pressure 116/66, pulse is 74, respirations are 16.  He is 
afebrile.  Oxygen saturation 92% on supplemental oxygen.  GENERAL:  He is a well-developed, elderly w
poli male, who is resting comfortably, in no acute distress.  HEENT:  Eyes:  DAVID.  EOMI.  Throat zurdo
ws no erythema or tonsillar hypertrophy.  NECK:  Supple.  There is cervical adenopathy.  HEART:  Regu
lar rate and rhythm, with a 2/6 systolic murmur, left sternal border, without radiation.  LUNGS:  Dim
inished breath sounds.  A few crackles on the left base.  He has a prolongation expiratory phase.  AB
DOMEN:  Soft, nontender.  Bowel sounds are present.  EXTREMITIES:  No clubbing, cyanosis, or edema.



LABORATORIES:  White count 3.4, hemoglobin 12, hematocrit 36, platelet count is 184.  INR is 1.25.  S
odium 140, potassium 3.8, chloride 100, CO2 is 29, BUN 14, creatinine 1.0 glucose is 82.



IMPRESSION:  

1.  Status post transcatheter aortic valve implantation. 

2.  Respiratory:  Stable on supplemental oxygen.  He is usually on 2 L at home.

3.  History of anxiety.

4.  Atrial fibrillation.

5.  Congestive heart failure, with ejection fraction of 45%.

6.  Coronary artery disease.

7.  History of depression.

8.  History of factor V Leiden deficiency.

9.  Chronic renal insufficiency.  

10.  Metastatic prostate cancer.

11.  Rheumatoid arthritis.



RECOMMENDATIONS:  

1.  Wean FiO2 as tolerated.

2.  Close cardiovascular monitoring, especially with concerns of his being somewhat sleepy at this ti
me.

3.  Continue the majority of his home medications.

4.  DVT and PE prophylaxis.  

5.  Stress ulcer prophylaxis.

6.  Adequate pain control.





Job #:  [id number]

## 2017-11-13 NOTE — ASMTCMCOM
CM Note

 

CM Note                       

Notes:

83 year old male admitted for a TAVR. Has a hx of CHF, Anxiety, Aortic 

stenosis, Afib, CAD, Depression, Factor V Leider deficiency, GERD, HLD, Hypothyroid, Met prostate 

CA, RA. CM to follow, may not have discharge needs.

 

Date Signed:  11/13/2017 03:14 PM

Electronically Signed By:Cyn King LCSW

## 2017-11-13 NOTE — PDANEPAE
ANE History of Present Illness





critical AS s/f TAVR





ANE Past Medical History





- Cardiovascular History


Hx Hypertension: Yes


Hx Arrhythmias: Yes


Hx Chest Pain: Yes


Hx Coronary Artery / Peripheral Vascular Disease: Yes


Hx CHF / Valvular Disease: Yes


Hx Palpitations: Yes





- Pulmonary History


Hx Oxygen in Use at Home: Yes


Hx Sleep Apnea: No


Pulmonary History Comment: lung resection





- Endocrine History


Hx Diabetes: No


Hypothyroid: Yes





- Renal History


Renal History Comment: prostate ca





- Chronic Pain History


Chronic Pain: Yes (Neck pain r/t degenerative joint disease)





ANE Review of Systems


Review of Systems: 








- Exercise capacity


METS (RN): 2 METS





ANE Patient History





- Allergies


Allergies/Adverse Reactions: 








daptomycin Allergy (Verified 10/02/17 21:01)


 








- Home Medications


Home medications: home medication list seen and reviewed


Home Medications: 








Tamsulosin HCl [Flomax 0.4 MG (*)] 0.4 mg PO DAILY 03/13/12 [Last Taken 10/24/17

]


Levothyroxine [Synthroid 150 mcg (*)] 150 mcg PO DAILY06 12/30/13 [Last Taken 10

/24/17]


Acyclovir [Zovirax 400 mg (*)] 400 mg PO DAILY PRN 01/24/17 [Last Taken 03/24/17

]


Digoxin [Lanoxin 250 mcg (RX)] 125 mcg PO DAILY10 01/24/17 [Last Taken 10/24/17]


Nitroglycerin [Nitrostat 0.4 mg (*)] 0.4 mg SL Q5M PRN 01/24/17 [Last Taken 03/ 24/17]


Warfarin Sodium [Coumadin 5MG (*)] 5 mg PO SUMOWETHSA@16 01/24/17 [Last Taken 10

/23/17]


Clopidogrel Bisulfate [Plavix (*)] 75 mg PO DAILY 03/25/17 [Last Taken 10/24/17]


Enzalutamide [Xtandi] 80 mg PO DAILY 10/26/17 [Last Taken 10/24/17]


Ipratropium 0.03% Nasal [Atrovent 0.03% Nasal (*)] 2 sprays EACHNARE DAILY PRN 

10/26/17 [Last Taken Unknown]


Ascorbic Acid [Vitamin C 500 mg (*)] 1,000 mg PO DAILY 11/03/17 [Last Taken 

Unknown]


Cholecalciferol Vit D3 [Vitamin D3 (*)] 1,000 units PO DAILY 11/03/17 [Last 

Taken Unknown]


Furosemide [Lasix 40 MG (*)] 40 mg PO DAILY 11/03/17 [Last Taken Unknown]


Herbals/Supplements -Info Only 1 ea PO DAILY 11/03/17 [Last Taken Unknown]


Rosuvastatin Calcium [Crestor] 10 mg PO DAILY 11/03/17 [Last Taken Unknown]


Warfarin Sodium [Coumadin 7.5MG (*)] 7.5 mg PO TUFR@16 11/03/17 [Last Taken 

Unknown]








- Anes Hx


Anes Hx: no prior problems





- Smoking Hx


Smoking Status: Never smoked


Marijuana use: No





- Alcohol Use


Alcohol Use: None





ANE Labs/Vital Signs





- Labs - CBC


WBC: reviewed





ANE Physical Exam





- Airway


Neck exam: decreased ROM


Mallampati Score: Class 2


Mouth exam: normal dental/mouth exam





- Pulmonary


Pulmonary: inspiratory crackles





- Cardiovascular


Cardiovascular: regular rate and rhythym





- ASA Status


ASA Status: IV





ANE Anesthesia Plan


Anesthesia Plan: general endotracheal anesthesia


Lines/Monitors: arterial line (by cvc staff), YANNI (placed by me, run by 

cardiology)

## 2017-11-13 NOTE — ECHO
[email or web address]





[address] [address] 

Main: [phone] 



Fax: 



Transesophageal Echocardiography 

Name:            FAINA EDWARSD                    MR#:

[id number]

Study Date:      11/13/2017                          Study Time:

07:46 AM

YOB: 1934                          Age:          83

year(s)

Height:            ( )                               Weight:         (

)

BSA:                                                 Gender:

Male

Examination:     YANNI                                 Indication:

AS;TAVR

Image Quality:   Adequate                            Contrast: 

Requested by:     Markus Haney  

Heart Rate:                                          Rhythm: 

BP:                / 



Procedure Staff 

Ultrasound Technician:   Alexandra Stafford Physician:       Brook Holloway 

Requesting Provider:     Markus Haney 



YANNI Exam Details 

Patient Consent:      Risks, alternatives of procedure explained to

patient, informed consent obtained.

Exam Location:        Cath Lab 

Patient Fasting:      yes 



Conclusions:          Successful deployment of 34 mm Evolut TAVR.

Minimal perivalvular regurgitation; improvement in

degree of MR to trace/mild post TAVR. Mean prosthetic valve gradient

is 3 mmHg.







Measurements: 

Chambers                   Valvular Assessment AV/MV          Valvular

Assessment TV/PV



Normal                                Normal

Normal

Name        Value     Range             Name        Value Range

Name          Value Range

LVOTd       2.1 cm    2.1 cm mm             AV meanPG:  3 mmHg ( - )  



MERI (VTI):  2.1 cm ( - )  

AR (PHT):   363 ms ( - )  



Additional Measurements: 

Valvular Assessment AV/MV  



Name                   Value  

AR Vmax:             3.45 cm/s   



Findings:             Left Ventricle: 

Patient: FAINA EDWARDS                  MRN: [id number]

Study Date: 11/13/2017   Page 1 of 2



07:46 AM 









Pre TAVR: Severely reduced LV function; estimated ejection fraction

30%. Moderate AI and

moderate MR. No pericardial effusion 2/3 deployment: Mild to moderate

AI Post TAVR YANNI images:

Severely reduced LV function. Moderate to severe MR Trivial AI No

pericardial effusion

Transthoracic echo for AV gradient: Max velocity across the AV 1.2

m/sec, 3/6 mean/max pressure

gradient (underestimated due to low EF) Mild to moderate AI No

pericardial effusion.



l1n 



Electronically signed by Brook Holloway on 11/13/2017 at 05:46 PM 

(No Signature Object) 



Patient: FAINA EDWARDS                  MRN: [id number]

Study Date: 11/13/2017   Page 2 of 2

07:46 AM 







[id number]

## 2017-11-13 NOTE — CPIP
[f rep st]



                                                       INVASIVE CARDIAC PROCEDURE





DATE OF PROCEDURE:  11/13/2017



CO-SURGEONS:  Chaim Ordonez DO. 

Diego Marrero MD. 

Brook Holloway MD.



PROCEDURE:  

1.  Nonselective left coronary sheathogram. 

2.  Right coronary sheathogram.  

3.  A 6-Mozambican sheath right common femoral vein.  

4.  Left common femoral artery upsized from a 6-Mozambican sheath to a 16-Mozambican sheath with triple Precl
ose placement.  

5.  Placement of Medtronic CoreValve Evolut R 34 mm by the transfemoral route.



INDICATIONS:  This is an 83-year-old male with history of critical aortic stenosis, heart failure, by
pass surgery, cancer and factor V Leiden deficiency; who was deemed to be extreme high-risk candidate
 for open AVR by 2 separate CT surgeons.  Given these findings, patient was consented for transfemora
l TAVR.



DESCRIPTION OF PROCEDURE:  After informed consent, patient was brought to [facility], where the bilateral gr
oins were prepped in sterile fashion.  Patient was electively intubated.  Patient was administered 1 
g of Ancef prior to the procedure.  A 6-Mozambican sheath was placed in right common femoral artery, veri
fied angiographically.  A 6-Mozambican sheath was placed in left common femoral artery, verified angiogra
phically.  The 6-Mozambican sheath in right common femoral artery was upsized to 8-Mozambican sheath.  The 6-
Mozambican sheath was placed in right common femoral vein.  Temporary pacemaker wire was placed in right 
ventricle.  Pacemaker capture was assessed.  The 6-Mozambican sheath in the left common femoral artery wa
s then upsized after triple Preclose placement to a 16-Mozambican sheath over a stiff wires.  After this 
was performed, the patient was administered a total of 10,000 heparin IV.  The valve was crossed with
 AL1 catheter, switched to Glidewire, switched out for a pigtail catheter, switched out for _________
_ J-wire, switched out for a Confida wire.  Placement of Medtronic CoreValve Evolut 34 mm valve was t
hen placed successfully.  After placement of the valve, there was trivial perivalvular leak noted wit
h excellent apposition of the valve.  The pigtail catheter was removed.  The wire was removed.  The l
eft groin was closed with triple Preclose placement.  The right groin was closed with 8-Mozambican Angio-
Seal.  The 6-Mozambican sheaths were closed manual pressure.  Patient tolerated the procedure well.  No c
omplications.



IMPRESSION:  Successful placement of Medtronic CoreValve Evolut 34 R via the transfemoral route.



PLAN:  The patient will be admitted to ICU.  Further orders following clinical course.





Job #:  [id number]

## 2017-11-13 NOTE — GOP
[f rep st]



                                                                OPERATIVE REPORT





DATE OF OPERATION:  11/13/2017



SURGEON:  Chaim Ordonez DO



ANESTHESIOLOGIST:  Dr. Marrero



PREOPERATIVE DIAGNOSIS:  



POSTOPERATIVE DIAGNOSIS:  



PROCEDURE PERFORMED:  Left common femoral artery deployment of 34 mm Evolut Pro aortic valve under fl
uoroscopic guidance.



FINDINGS:  





DESCRIPTION OF PROCEDURE:  The patient was brought to the cath lab, intubated, monitoring lines were 
placed.  Transesophageal echo was placed and read by Dr. Holloway.  The groins were prepped and draped in
 sterile classical manner.  Dr. Haney gained access to right common femoral vein and the arterial a
ccess on both sites.  Please see separate dictation.  



After a Confida wire was placed in the left ventricle under fluoroscopic and echo guidance, I then de
ployed a 34 mm Evolut Pro valve with good visualization, and no parallax.  Initial deployment showed 
good coaptation and no perivalvular leak at 3/4 deployment.  We then fully deployed the valve under e
cho guidance and fluoro guidance, repositioning the LV wire and cone.  When we were confident that th
ere was no further paddle connection to the deployment device, it was retracted back into the descend
ing thoracic aorta.  The wire was kept in the left ventricle.  Confirmation of position as dictated dee Haney was performed with the echo and fluoro.  Please see his separate dictation.



COSURGEON:  Markus Haney MD





Job #:  [id number]

## 2017-11-14 NOTE — ASMTCMCOM
CM Note

 

CM Note                       

Notes:

KANA spoke w/ NILA Almaraz regarding d/c POC. It is recommended that pt have outpatient cardaic 

rehab for his post op TAVR. CM available for d/c needs. 

 

Date Signed:  11/14/2017 02:42 PM

Electronically Signed By:DEION Rey

## 2017-11-14 NOTE — PDCARPN
Cardiology Progress Note


Chief Complaint: 





SOB


Assessment/Plan: 


Assessment:





s/p TAVR


anemia


factor V leiden def


prostate CA























Plan:





11/14/17 08:08


PT


OOB


echo today


OK to transfer to PCU





Objective: 





 Vital Signs (8 Hrs)











  Pulse Resp BP Pulse Ox


 


 11/14/17 06:00  76  20  114/66  96


 


 11/14/17 04:00  75  14  100/49 L  95


 


 11/14/17 03:00  74  15  113/44 L  96


 


 11/14/17 02:00  76  18  107/50 L  96


 


 11/14/17 01:00  76  15  113/46 L  97








 Intake/Output (24 Hrs)











 11/13/17 11/14/17 11/15/17





 05:59 05:59 05:59


 


Intake Total  700 


 


Output Total  1050 


 


Balance  -350 


 


Intake:   


 


  Oral (ml)  700 


 


Output:   


 


  Urine (ml)  1050 


 


    Urinal  1050 


 


Other:   


 


  Weight  72.6 kg 


 


  Bladder Scan Volume (ml)   


 


    Urinal  181 











Result Diagrams: 


 11/14/17 05:45





 11/14/17 05:45





- Physical Exam


Constitutional: healthy appearing


Eyes: PERRL


Cardiovascular: regular rate and rhythm, no murmurs


Peripheral Pulses: 1+: femoral (R), femoral (L)


Respiratory: no crackles


Gastrointestinal: normoactive bowel sounds


Skin: no rashes


Musculoskeletal: no muscular tenderness


Neurologic: AAOx3


Psychiatric: cooperative





ICD10 Worksheet


Patient Problems: 


 Problems











Problem Status Onset


 


Coronary artery bypass grafting Active  


 


Hematuria syndrome Active  


 


Nausea and vomiting Active  


 


Non-healing surgical wound Active  


 


Acute combined systolic and diastolic CHF, NYHA class 3 Acute  


 


Chest pain Acute  


 


Chronic Disease Management/Transitional Care Program Acute  


 


Coronary arteriosclerosis Acute  


 


Elevated bilirubin Acute  


 


Elevated troponin Acute  


 


Gastrointestinal bleeding, lower Acute  


 


S/P CABG x 3 Acute

## 2017-11-14 NOTE — CPEKG
Heart Rate: 76

RR Interval: 789

P-R Interval: 278

QRSD Interval: 172

QT Interval: 436

QTC Interval: 491

P Axis: -18

QRS Axis: -67

T Wave Axis: 30

EKG Severity - ABNORMAL ECG -

EKG Impression: SINUS RHYTHM

EKG Impression: FIRST DEGREE AV BLOCK

EKG Impression: RIGHT BUNDLE BRANCH BLOCK

Electronically Signed By: Gamal Brady 14-Nov-2017 16:49:49

## 2017-11-15 NOTE — PDCARPN
Cardiology Progress Note


Chief Complaint: 





s/p TAVR


Assessment/Plan: 


Assessment:





s/p TAVR


anemia


factor V leiden def


prostate CA























Plan:





11/14/17 08:08


PT


OOB


echo today


OK to transfer to PCU





11/15/17 06:52


Stable but weak


Continue OOB/PT


plan d/c today to rehab


Hold plavix, continue coumadin


f/u in 1 week





Subjective: 





c/o weakness


Reviewed/Discussed With: other (RN)


Time Spent With Patient: 





25 min


Objective: 





 Vital Signs (8 Hrs)











  Temp Pulse Resp BP Pulse Ox


 


 11/15/17 04:00  36.6 C  77  19  105/56 L  95


 


 11/14/17 23:10  36.9 C  81  16  115/59 L  98








 Intake/Output (24 Hrs)











 11/14/17 11/15/17 11/16/17





 05:59 05:59 05:59


 


Intake Total 700 200 


 


Output Total 1050 475 


 


Balance -350 -275 


 


Intake:   


 


  Oral (ml) 700 200 


 


Output:   


 


  Urine (ml) 1050 475 


 


    Urinal 1050 475 


 


Other:   


 


  Weight 72.6 kg  


 


  Number of Voids   


 


    Urinal  2 


 


  Bladder Scan Volume (ml)   


 


    Urinal 181  











Result Diagrams: 


 11/15/17 03:56





 11/15/17 03:56





- Physical Exam


Constitutional: no apparent distress


Ears, Nose, Mouth, Throat: moist mucous membranes


Cardiovascular: regular rate and rhythm, no murmurs


Respiratory: no crackles


Gastrointestinal: no tenderness


Skin: warm


Neurologic: AAOx3


Psychiatric: cooperative





ICD10 Worksheet


Patient Problems: 


 Problems











Problem Status Onset


 


Coronary artery bypass grafting Active  


 


Hematuria syndrome Active  


 


Nausea and vomiting Active  


 


Non-healing surgical wound Active  


 


Acute combined systolic and diastolic CHF, NYHA class 3 Acute  


 


Chest pain Acute  


 


Chronic Disease Management/Transitional Care Program Acute  


 


Coronary arteriosclerosis Acute  


 


Elevated bilirubin Acute  


 


Elevated troponin Acute  


 


Gastrointestinal bleeding, lower Acute  


 


S/P CABG x 3 Acute

## 2017-11-15 NOTE — ASDISCHSUM
----------------------------------------------

Discharge Information

----------------------------------------------

Plan Status:SNF                                      Medically Cleared to Leave:11/14/2017

Discharge Date:11/15/2017 01:19 PM                   CM D/C Disposition:

ADT D/C Disposition:[facility] IP                Projected Discharge Date:11/15/2017 11:00 AM

Transportation at D/C:                               Discharge Delay Reason:

Follow-Up Date:11/15/2017 11:00 AM                   Discharge Slot:

Final Diagnosis:

----------------------------------------------

Placement Information

----------------------------------------------

Referral Type:*Nursing Home/SNF                      Referral ID:[id number]

Provider Name:[facility]

Address 1:[address]                    Phone [phone]

Address 2:                                           Fax [phone]

City:[place]                                      Selection Factors:

State:[place]

 

----------------------------------------------

Patient Contact Information

----------------------------------------------

Contact Name:SHERRIE                           Relationship:Wife

Address:[address] [place]                            Home [phone]

                                                     Work [phone]

City:[place]                                         Alternate Phone:

State/Zip Code:[place] [address]                              Email:

----------------------------------------------

Financial Information

----------------------------------------------

Financial Class:

Primary Plan Desc:MEDICARE INPATIENT                 Primary Plan Number:[id number]

Secondary Plan Desc:[employer]/MDR SUPPLEMENT              Secondary Plan Number:[id number]

 

 

----------------------------------------------

Assessment Information

----------------------------------------------

----------------------------------------------

BC CM Progress Note

----------------------------------------------

CM Note

 

CM Note                       

Notes:

83 year old male admitted for a TAVR. Has a hx of CHF, Anxiety, Aortic 

stenosis, Afib, CAD, Depression, Factor V Leider deficiency, GERD, HLD, Hypothyroid, Met prostate 

CA, RA. CM to follow, may not have discharge needs.

 

Date Signed:  11/13/2017 03:14 PM

Electronically Signed By:Cyn King LCSW

 

 

----------------------------------------------

BC CM Progress Note

----------------------------------------------

CM Note

 

CM Note                       

Notes:

CM spoke w/ NILA Almaraz regarding d/c POC. It is recommended that pt have outpatient cardaic 

rehab for his post op TAVR. CM available for d/c needs. 

 

Date Signed:  11/14/2017 02:42 PM

Electronically Signed By:DEION Rey

 

 

----------------------------------------------

Case Management Discharge Plan Note

----------------------------------------------

Case Management Discharge

 

Discharge Order Complete?     Answers:  Yes                                   

Patient to Obtain             Answers:  Other                         Notes:  [facility]

Medications                                                                   

Transportation Arranged       Answers:  Other                         Notes:  Wheelchair arranged and
 

                                                                              paid for by [facility]

Transport will Pick (Date     11/15/2017 01:00 PM

& Time)                       

[facility] Complete               Answers:  No                                    

Case Management Transport     Answers:  Yes                                   

Form Complete                                                                 

Faxed Final Orders            Answers:  Yes                                   

Agency/Facility Transfer      Answers:  Yes                                   

Report Printed & Faxed to                                                     

Receiving Agency                                                              

Family Notified               Answers:  Yes                                   

Discharge Comments            

Notes:

Pt is being discharged today back to [facility]. Pt is too weak to go to cardiac rehab at this 

time. CM spoke timbo Xiong at [facility] and she will set up wheelchair transportation. CM provided 

ESTHELA Osborne w/ phone number to give report. CM sent d/c orders. CM available for changes. 

 

Date Signed:  11/15/2017 01:08 PM

Electronically Signed By:DEION Rey

 

 

----------------------------------------------

Intervention Information

----------------------------------------------

Intervention Type:*IM-Signed                         Date of Service:11/15/2017 09:39 AM

Patient Type:Inpatient                               Staff Member:Liliam Castanon

Hours:                                               Discipline:

Severity:                                            Comment:

## 2017-11-15 NOTE — PDIAF
- Diagnosis


Diagnosis: TAVR


Code Status: Full Code





- Medication Management


Discharge Medications: 


 Medications to Continue on Transfer





Tamsulosin HCl [Flomax 0.4 MG (*)] 0.4 mg PO DAILY 03/13/12 [Last Taken 10/24/17

]


Levothyroxine [Synthroid 150 mcg (*)] 150 mcg PO DAILY06 12/30/13 [Last Taken 10

/24/17]


Acyclovir [Zovirax 400 mg (*)] 400 mg PO DAILY PRN 01/24/17 [Last Taken 03/24/17

]


Digoxin [Lanoxin 250 mcg (RX)] 125 mcg PO DAILY10 01/24/17 [Last Taken 10/24/17]


Nitroglycerin [Nitrostat 0.4 mg (*)] 0.4 mg SL Q5M PRN 01/24/17 [Last Taken 03/ 24/17]


Warfarin Sodium [Coumadin 5MG (*)] 5 mg PO SUMOWETHSA@16 01/24/17 [Last Taken 10

/23/17]


Enzalutamide [Xtandi] 80 mg PO DAILY 10/26/17 [Last Taken 10/24/17]


Ipratropium 0.03% Nasal [Atrovent 0.03% Nasal (*)] 2 sprays EACHNARE DAILY PRN 

10/26/17 [Last Taken Unknown]


Acetaminophen [Tylenol 325mg (*)] 650 mg PO Q4HRS PRN  tab 10/31/17 [Last Taken 

Unknown]


Pantoprazole Sodium [Protonix 40mg (*)] 40 mg PO BID  tab 10/31/17 [Last Taken 

Unknown]


Potassium Chloride Po [Potassium Chloride 20 mg/15 ml (*)] 20 meq PO BID  udcup 

10/31/17 [Last Taken Unknown]


Ascorbic Acid [Vitamin C 500 mg (*)] 1,000 mg PO DAILY 11/03/17 [Last Taken 

Unknown]


Cholecalciferol Vit D3 [Vitamin D3 (*)] 1,000 units PO DAILY 11/03/17 [Last 

Taken Unknown]


Furosemide [Lasix 40 MG (*)] 40 mg PO DAILY 11/03/17 [Last Taken Unknown]


Herbals/Supplements -Info Only 1 ea PO DAILY 11/03/17 [Last Taken Unknown]


Rosuvastatin Calcium [Crestor] 10 mg PO DAILY 11/03/17 [Last Taken Unknown]


Warfarin Sodium [Coumadin 7.5MG (*)] 7.5 mg PO TUFR@16 11/03/17 [Last Taken 

Unknown]


Acetaminophen [Tylenol 325mg (*)] 650 mg PO Q6HRS  tab 11/15/17 [Last Taken 

Unknown]


Benzocaine/Menthol 15/4 [Cepacol Lozenge] 1 ea PO PRN PRN  lozenge 11/15/17 [

Last Taken Unknown]


Digoxin [Lanoxin 250 mcg (RX)] 125 mcg PO DAILY10  tab 11/15/17 [Last Taken 

Unknown]


Warfarin Sodium [Warfarin Pharmacy To Dose] 1 each MISC AD  ea 11/15/17 [Last 

Taken Unknown]








Discharge Medications: Refer to the Discharge Home Medication list for PRN 

reason.





- Orders


Services needed: Physical Therapy


Diet Recommendation: sodium restricted, cardiac -low fat low salt


Diet Texture: Regular Texture Diet, Thin Liquids, Meds Whole in Puree





- Follow Up Care


Current Providers and Referrals: 


Michael Prado MD [Primary Care Provider] -

## 2017-11-15 NOTE — GDS
[f rep st]



                                                             DISCHARGE SUMMARY





DISCHARGE DIAGNOSIS:  Status post TAVR.



BRIEFLY:  This is an 83-year-old male with multiple comorbid issues including metastatic prostate can
cer, previous left lung lobectomy, CABG, factor V Leiden deficiency, critical aortic stenosis who was
 deemed to be extreme high-risk/inoperable by CT surgery for his underlying aortic stenosis.  The pat
ient underwent successful transfemoral TAVR with a CoreValve 34 mm Evolut R on 11/13/2017.  Post proc
edure patient has done quite well and has been recovering nicely with no major cardiovascular issues.
  Post procedure echocardiogram showed an EF of approximately 25%-30% with a normally functioning bio
prosthetic aortic valve.  There was mild to moderate perivalvular leak with mild to moderate MR.  The
 patient has been working with physical therapy and has noticed a significant improvement in his daisy
thing.  We will discharge patient today back to his rehab facility with his home medications includin
g Coumadin.  We will hold his Plavix as his coronary stent was placed over 3 years ago and his platel
et counts have dropped slightly after placement of the TAVR.  He will follow up with me in 1 weeks ti
me.  Of note, the patient's baseline hemoglobin was 12, which did drop to 10, and then 9.7 today.  It
 is stabilizing and his creatinine is normal.  We feel confident that it will rebound given time.





Job #:  [id number]

## 2017-11-22 NOTE — CPEKG
Heart Rate: 82

RR Interval: 732

P-R Interval: 264

QRSD Interval: 154

QT Interval: 432

QTC Interval: 505

P Axis: 0

QRS Axis: -76

T Wave Axis: 89

EKG Severity - ABNORMAL ECG -

EKG Impression: VENTRICULAR-PACED RHYTHM

Electronically Signed By: Didier Silver 24-Nov-2017 16:44:31

## 2017-11-23 NOTE — GHP
[f 
rep st]



                                                            HISTORY AND PHYSICAL





DATE OF ADMISSION:  11/22/2017



CHIEF COMPLAINT:  intermittent confusion



HISTORY OF PRESENT ILLNESS:  An 83-year-old male with a complex medical history 
Including recent TAVR, 11/13/2017, systolic heart failure, atrial fibrillation, 
prostate cancer, factor V Leiden deficiency, and CKD was brought in from 
[facility] for evaluation of a subtherapeutic INR and confusion. Underwent his 
aortic valve replacement by Dr. Haney and was discharged on Coumadin however 
INR has been low 14.-1.7. 



His wife was at bedside with ER doctor and says he was doing well Monday morning
, but when she visited him around 3 p.m. that afternoon, he was napping and she 
was unable to wake him easily.  He seemed confused and he said, "Something is 
wrong with me."  Yesterday evening he had memory deficits and confusion..  Today
, his wife insisted that he be evaluated. 



During my interview, he denies headache, vision changes, focal weakness or 
slurred speech.  Was nauseated this evening.  No chest pain or shortness of 
breath.  He has been constipated.  He has a productive cough of yellow sputum 
over the last couple days.



REVIEW OF SYSTEMS:  I completed a 10-point review of systems, negative except 
as noted in HPI.



PAST MEDICAL HISTORY:  

1.  Diastolic dysfunction and systolic heart failure with an EF of 20% to 30%, 
moderate aortic regurgitation, status post TAVR, 11/13/2017. 

2.  Atrial fibrillation.

3.  Anxiety.

4.  Back pain.

5.  Factor V Leiden:  History of DVTs and PEs.

6.  Cardiac arrest.

7.  Cataracts.

8.  Depression.

9.  Hypothyroidism.

10.  Hypertension.

11.  GERD.

12.  Hyperlipidemia. 

13.  CKD 3.

14.  Osteoarthritis.

15.  Prostate cancer.

16.  Lung cancer.

17.  Rheumatoid arthritis.

18.  Chronic hypoxemic respiratory failure on 2 L.

19.  Vertigo.

20.  Pacemaker.

21.  Insomnia.



PAST SURGERIES:  

1.  Coronary bypass surgery, 2012, 3 vessels.

2.  Hammertoe surgery.

3.  Hernia repair.

4.  Hip replacement.

5.  Melanoma resection.

6.  Pacemaker.  

7.  Thoracotomy/lobectomy for sarcoma, left chest.



SOCIAL HISTORY:  Was previously living at his home in [place] with his wife 
until his previous surgery, and then he was discharged to rehab at [facility].  
He denies alcohol, tobacco, or illicits.



FAMILY HISTORY:  Paternal grandmother with heart disease.



ALLERGIES:  Daptomycin.



HOME MEDICATIONS:  Coumadin 10 mg daily, Flomax 0.4 mg, Crestor 10 mg daily, 
potassium 20 mEq b.i.d., Protonix 40 mg daily, nitroglycerin p.r.n., 
levothyroxine 150 mcg, Lasix 40 mg daily, Xtandi 80 mg daily, Digoxin 125 mcg 
daily, vitamin D3, vitamin C.



PHYSICAL EXAMINATION:  VITAL SIGNS:  Temperature 37.1, blood pressure 108/67, 
heart rate 70s, respirations 18, 97% on room air.  GENERAL:  Elderly male lying 
in bed, in no acute distress.  HEENT:  PERRLA.  EOMI.  Oropharynx clear.  
CARDIOVASCULAR:  Heart rate is regularly irregular, +3 pitting edema to the 
knees bilaterally.  LUNGS:  Diminished at the bases.  ABDOMEN:  Soft, nontender
, nondistended.  GENITOURINARY:  No suprapubic or CVA tenderness.  
MUSCULOSKELETAL:  5/5 upper and lower extremity strength.  NEUROLOGIC:  2 
through 12 intact.  No focal deficits.  Normal sensation to touch.  PSYCHIATRIC
:  Alert and oriented x3.



LABORATORY DATA:  WBC 3.9, hemoglobin 11, hematocrit 35, platelets 140.  INR is 
2.03, PT is 23.  Sodium 139, potassium 4.2, chloride 99, carbon dioxide is 32, 
creatinine is 1.  Glucose 102, calcium 8.3.  Troponin is 0.099, which was down 
from his last visit, 10/26/2017.  His chest x-ray is personally reviewed by me:
  Pacemaker, sternotomy wires, a right-sided pleural effusion, a large hernia, 
and cardiomegaly.  CT head:  Moderate generalized volume loss.  Moderate 
supratentorial chronic small-vessel ischemic changes.  No acute bleed.  Carotid 
Doppler ultrasound, 11/06/2017:  Bilateral vertebral arteries are patent.  No 
evidence of flow-limiting carotid stenosis.



ASSESSMENT/PLAN:  

1.  Encephalopathy: currently AxO x 3 without neurologic deficits.  DDx: 
infection, TIA with embolic source  since has atrial fibrillation. Can consider 
cerebral vein thrombosis, given Factor V Leiden deficiency. CXR negative, but 
he has new cough, so check viral PCR. UA to ensure no infection. CTH shows 
atrophy; cannot do MRI with pacemaker.  

2.  Bioprosthetic aortic valve replacement:  INR is now therapeutic at 2, so 
will not bridge with age and bleeding risk.

3.  Atrial fibrillation:  CHADS-VASc score is 4.  INR is at goal 2.  Continue 
Coumadin.  

4.  Metastatic prostate cancer:  Continue Xtandi.

5.  Atrial fibrillation:  Digoxin, coumadin

6.  Hypothyroidism:  Levothyroxine.

7.  Mildly decompensated systolic heart failure: EF 25-30%. mildly volume 
overload. Treat with IV Lasix while here.

8.  Coronary artery disease:  Continue statin.

9.  Gastroesophageal reflux disease:  Proton pump inhibitor.

10.  Benign prostatic hypertrophy:  Tamsulosin.

11.  Chronic hypoxemic respiratory failure:  He is stable on 2 L.

12.  Diet:  Cardiac.  

13.  Normocytic anemia:  H and H are stable.  Will monitor closely with 
anticoagulation.

14.  DVT prophylaxis:  He is on Coumadin.

15.  Disposition:  Patient warrants observation admission, given concern for 
acute encephalopathy, warranting cardiac monitoring, PT evaluation.





Job #:  [id number]

MTDD

## 2017-11-23 NOTE — ASMTCMCOM
CM Note

 

CM Note                       

Notes:

Reviewed chart, spoke w/ ESTHELA Bee regarding discharge plan, pt's progress. Per MD notes, pt 

admitted w/ transient confusion, metabolic encephalopathy vs TIA w/ a subtherapeutic INR. Pt is s/p 


TAVR this admission, w/ an extensive cardiac hx. Admitted from [facility]. Met w/ pt and pt's 

wife to discuss discharge plan. Wife reports being very unhappy with the fact her  had a 

subtherapeutic INR resulting in a TIA  [facility] at [facility]. CM listened to pt and family's 

concerns; support provided. Offered wife alternate list of SNF facilities, wife declined, stating 

she would like for her  to return to [facility] upon d/c. PT/OT cont to rec SNF. CM 

will cont to follow.







Current Discharge Plan: [facility]

 

Date Signed:  11/23/2017 03:31 PM

Electronically Signed By:Awilda Peterson RN

## 2017-11-23 NOTE — HOSPPROG
Hospitalist Progress Note


Assessment/Plan: 





*  Transient confusion - metabolic encephalopathy vs. TIA


   -INR subtherapeutic for prolonged period at SNF without bridging


   -unable to do MRI due to PCM


   -check repeat ECHO and CTA head/neck rule out active clot


   -INR now therapeutic - no Lovenox indicated at this time


*  s/p TAVR


*  Factor V Leiden with recurrent PE/DVT


   -resume lovenox if INR < 2


*  Afib/PCM


*  Acute on chronic systolic CHF - EF 20%


   -IV lasix


*  CAD/CABG


*  Bioprosthetic AVR


*  chronic respiratory failure 2L


*  RA


*  Metastatic prostate cancer + lung ca/sarcoma


   -need to clarify malignancy status


   -continue Xtandi











Subjective: no complaints


Objective: 


 Vital Signs











Temp Pulse Resp BP Pulse Ox


 


 36.5 C   76   20   99/59 L  99 


 


 11/23/17 12:00  11/23/17 12:00  11/23/17 12:00  11/23/17 12:00  11/23/17 12:00








 Microbiology











 11/23/17 06:30 Respiratory Panel (PCR) - Final





 Nasal, Sinus - Swab    No Organism Detected








 Laboratory Results





 11/23/17 03:47 





 11/23/17 03:47 





 











 11/22/17 11/23/17 11/24/17





 05:59 05:59 05:59


 


Intake Total  100 


 


Output Total  350 200


 


Balance  -250 -200








 











PT  23.9 SEC (12.0-15.0)  H  11/23/17  03:47    


 


INR  2.12  (0.83-1.16)  H  11/23/17  03:47    








head CT negative


CXR viewed, my personal interpretation is - mild CHF








- Time Spent With Patient


Time Spent with Patient: greater than 35 minutes


Time Spent with Patient: Greater than 35 minutes spent on this patients care, 

greater than 50% of time spent counseling, educating, and coordinating care 

regarding the above mentioned plan.





- Physical Exam


Constitutional: no apparent distress, appears nourished, not in pain


Cardiovascular: regular rate and rhythym, no murmur, rub, or gallop


Respiratory: no respiratory distress, no rales or rhonchi, clear to auscultation


Gastrointestinal: normoactive bowel sounds, soft, non-tender abdomen, no 

palpable masses


Skin: no rashes or abrasions, no fluctuance, no induration


Neurologic: AAOx3, sensation intact bilaterally


Psychiatric: interacting appropriately, not anxious, not encephalopathic, 

thought process linear





ICD10 Worksheet


Patient Problems: 


 Problems











Problem Status Onset


 


Altered mental status Acute  


 


Subtherapeutic international normalized ratio (INR) Acute  


 


Coronary artery bypass grafting Active  


 


Hematuria syndrome Active  


 


Nausea and vomiting Active  


 


Non-healing surgical wound Active  


 


Acute combined systolic and diastolic CHF, NYHA class 3 Acute  


 


Chest pain Acute  


 


Chronic Disease Management/Transitional Care Program Acute  


 


Coronary arteriosclerosis Acute  


 


Elevated bilirubin Acute  


 


Elevated troponin Acute  


 


Gastrointestinal bleeding, lower Acute  


 


S/P CABG x 3 Acute

## 2017-11-23 NOTE — PDCARPN
Cardiology Progress Note


Chief Complaint: 





Confusion


Assessment/Plan: 


Assessment:





Pt well known to our cardiology service





1. sp TAVR


2. AFIB


3. Cardiomyopathy


4. Factor V Leiden mutation


5. ? neurological event











Plan:





-INR is therapeutic now, was low over last week.   No additional 

anticoagulation needed 


- On exam today has no neurological deficits, is not confused.


-Would ask for neuro consult prior to d.c.





Will sign off, please call if additional cardiology input needed.





11/23/17 10:24





Subjective: 





Pt feels well today, says he may have been confused yesterday.


Reports no weakness/ paresthesias.


Has no speech deficits and states feels at baseline.


Time Spent With Patient: 





30 m


Objective: 





 Vital Signs (8 Hrs)











  Temp Pulse Resp BP Pulse Ox


 


 11/23/17 07:14  36.5 C  68  14  110/60  98


 


 11/23/17 04:01  36.5 C  70  12  95/48 L  100








 Intake/Output (24 Hrs)











 11/21/17 11/22/17 11/23/17





 11:59 11:59 11:59


 


Intake Total   100


 


Output Total   350


 


Balance   -250


 


Intake:   


 


  Oral (ml)   100


 


Output:   


 


  Urine (ml)   350


 


    Urinal   350


 


Other:   


 


  Weight   72.575 kg











Result Diagrams: 


 11/23/17 03:47





 11/23/17 03:47


Telemetry: 





AF





- Physical Exam


Constitutional: no apparent distress


Eyes: PERRL, EOMI


Ears, Nose, Mouth, Throat: moist mucous membranes


Cardiovascular: systolic murmur, irregularly irregular


Respiratory: clear to auscultate bilat


Gastrointestinal: normoactive bowel sounds





ICD10 Worksheet


Patient Problems: 


 Problems











Problem Status Onset


 


Coronary artery bypass grafting Active  


 


Nausea and vomiting Active  


 


Non-healing surgical wound Active  


 


Hematuria syndrome Active  


 


Gastrointestinal bleeding, lower Acute  


 


Chronic Disease Management/Transitional Care Program Acute  


 


Acute combined systolic and diastolic CHF, NYHA class 3 Acute  


 


Coronary arteriosclerosis Acute  


 


S/P CABG x 3 Acute  


 


Chest pain Acute  


 


Elevated troponin Acute  


 


Elevated bilirubin Acute  


 


Subtherapeutic international normalized ratio (INR) Acute  


 


Altered mental status Acute

## 2017-11-23 NOTE — PDMN
Medical Necessity


Medical necessity: C/M review:  est. > 2 MN LOS for eval and TX of Acute 

transient confusion - metabolic encephalopathy versus TIA, acute on chronic 

systolic systolic heart failure, requiring planned Neurology consult, 

echocardiogram, ongoing IV Lasix, cardiac monitoring, acute inpt PT/OT/ST, 

comorbid INR subtheraputic for prolonged at SNF without anticoagulation bridging

, unable to do brain MRI due to permanent pacemaker, Factor V Leiden with 

recurrent PE/DVT, S/P TAVR  done11/13/2017, atrial fibrillation, CAD S/P CABG, 

bioprosthetic AVR, chronic respiratory failure on baseline chronic O2 2L/min, 

rheumatoid arthritis, hx metastatic prostate cancer plus lung cancer / sarcoma 

per 11/23/2017 Hospitalist progress note.

## 2017-11-24 NOTE — HOSPPROG
Hospitalist Progress Note


Assessment/Plan: 





*  Transient global amnesia vs. metabolic encephalopathy


   -INR subtherapeutic for prolonged period at SNF without bridging (but doubt 

CVA)


   -unable to do MRI due to PCM


   -INR now therapeutic - no Lovenox indicated at this time


   -mental status improving - now approaching return to baseline


*  s/p TAVR


*  Factor V Leiden with recurrent PE/DVT


   -resume lovenox if INR < 2


*  Afib/PCM


*  Acute on chronic systolic CHF - EF 25% - newly reduced


   -IV lasix


   -d/w Dr. Vegas - lower EF post TAVR probably a changed conduction effect


   -consider start ACEI/beta-blocker if BP tolerates


*  CAD/CABG


*  Bioprosthetic AVR


*  chronic respiratory failure 2L


*  RA


*  Metastatic prostate cancer + lung ca/sarcoma


   -need to clarify malignancy status


   -continue Xtandi











Subjective: No new compalints.


Objective: 


 Vital Signs











Temp Pulse Resp BP Pulse Ox


 


 36.6 C   79   18   96/54 L  97 


 


 11/24/17 12:00  11/24/17 12:00  11/24/17 12:00  11/24/17 12:00  11/24/17 12:00








 Laboratory Results





 11/24/17 04:41 





 11/24/17 04:41 





 











 11/23/17 11/24/17 11/25/17





 05:59 05:59 05:59


 


Output Total  900 275


 


Balance  -900 -275








 











PT  26.5 SEC (12.0-15.0)  H  11/24/17  04:41    


 


INR  2.41  (0.83-1.16)  H  11/24/17  04:41    








ECHO - EF 25 %


case d/w dr salcedo neurology - unlikely to be CVA





- Physical Exam


Constitutional: no apparent distress, appears nourished, not in pain


Cardiovascular: regular rate and rhythym, no murmur, rub, or gallop


Respiratory: no respiratory distress, no rales or rhonchi, clear to auscultation


Gastrointestinal: normoactive bowel sounds, soft, non-tender abdomen, no 

palpable masses


Skin: no rashes or abrasions, no fluctuance, no induration


Neurologic: AAOx3, sensation intact bilaterally


Psychiatric: interacting appropriately, not anxious, not encephalopathic, 

thought process linear





ICD10 Worksheet


Patient Problems: 


 Problems











Problem Status Onset


 


Altered mental status Acute  


 


Subtherapeutic international normalized ratio (INR) Acute  


 


Coronary artery bypass grafting Active  


 


Hematuria syndrome Active  


 


Nausea and vomiting Active  


 


Non-healing surgical wound Active  


 


Acute combined systolic and diastolic CHF, NYHA class 3 Acute  


 


Chest pain Acute  


 


Chronic Disease Management/Transitional Care Program Acute  


 


Coronary arteriosclerosis Acute  


 


Elevated bilirubin Acute  


 


Elevated troponin Acute  


 


Gastrointestinal bleeding, lower Acute  


 


S/P CABG x 3 Acute

## 2017-11-24 NOTE — GCON
[f 
rep st]



                                                                    CONSULTATION





NEUROLOGY CONSULTATION



REFERRING PHYSICIAN:  Daria Ramirez MD





CHIEF COMPLAINT:  Confusion.



HISTORY OF PRESENT ILLNESS:  The patient is a very pleasant 83-year-old 
gentleman who recently had a TAVR procedure and was off anticoagulation and 
restarted recently.  He was in rehabilitation when on Monday night his wife 
noticed the patient was "confused," meaning he seemed to have trouble with 
being oriented, remembering where he was, and kept repeating the same question 
regarding where he was.  The patient also told her that "there is something 
wrong," but there was no aphasia, no expressive or comprehensive problems.  No 
focal motor or sensory symptoms.  No bulbar symptoms.  It was pure "confusion" 
without aphasia and anterograde amnesia from the patient's point of view as he 
does not remember this period of time, which lasted several hours.  He actually 
spoke to Dr. Prado during this symptomatic period and does not remember 
that conversation whatsoever.  There were no focal symptoms.  He had CT of the 
head and neck, which showed no acute vascular abnormalities.  He has a benign 
meningioma noted incidentally.  Head CT showed no hemorrhage or signs of acute 
infarct.  The patient cannot have an MRI brain due to his pacemaker. 



The patient has atrial fibrillation in the background and is on anticoagulation 
as noted above.  His INR is now therapeutic at 2.4.



REVIEW OF SYSTEMS:  10-point review of systems was done and only pertinent to 
the HPI. 



For past medical history, social history, family history, medications, allergies
, please see Dr. Rodriguez's history and physical dated 11/22/2017.



PHYSICAL EXAM:  VITAL SIGNS:  Blood pressure 113/55, temperature 36.6, heart 
rate 80.  GENERAL:  No acute distress.  Very pleasant gentleman.  HIGHER MENTAL 
FUNCTION:  Awake and alert.  Names 5/5, repeats 5/5.  Follows commands 5/5.  
CRANIAL NERVES:  Normal 2 through 7 and 12.  

MOTOR:  Normal strength and tone throughout.  SENSORY:  Normal to light touch 
in all 4 extremities.  No sensory extinction.  COORDINATION:  Normal in all 4 
extremities.  In summary, nonfocal exam.



IMPRESSION AND PLAN:  

1.  Transient neurologic symptoms, resolved.

2.  Atrial fibrillation, on warfarin, with therapeutic INR.

3.  Status post transcatheter aortic valve replacement.  



The patient's clinical history better fits with transient global amnesia rather 
than a focal transient ischemic attack.  He presented with pure anterograde 
amnesia for several hours described as confusion.  There was no aphasia or 
other focal motor/sensory symptoms.  I counseled the patient and his wife at 
length that transient global amnesia does occur in his age group and can 
certainly be triggered by various factors.  However, the underlying etiology is 
unknown.  I agree from the management standpoint with ensuring that his INR is 
therapeutic, which it is therapeutic now, as if this were a variant of a 
transient ischemic attack, he is certainly now fully anticoagulated and 
treated.  His angiography of the head and neck showed no large vessel 
occlusion.  There is a small posterior meningioma, for which he can follow up 
with Neurosurgery.  I counseled them regarding this finding.  No further 
recommendations.  I have discussed the case with his hospitalist, Dr. Ramirez, as 
well. 



We will sign off and follow up as needed.  Please do not hesitate to call with 
any questions or changes in neurologic status with this very pleasant patient.  



70 total minutes floor time reviewing records, imaging, direct counseling with 
the patient and his wife, and in coordination of care.





Job #:  [id number]

MTDD

## 2017-11-24 NOTE — ECHO
[email or web address]





[address] [address] 

Main: [phone] 



Fax: 



Transthoracic Echocardiogram 

Name:             FAINA EDWARDS                     MR#:

[id number]

Study Date:       2017                           Study Time:

07:55 AM

YOB: 1934                           Age:

83 year(s)

Height:           172.7 cm (68 in.)                    Weight:

72.58 kg (160 lb.)

BSA:              1.86 m2                              Gender:

Male

Examination:      Limited Echo                         Indication:

TIA/off anticoagulation for 1 week/look for



embolic source 

Image Quality:                                         Contrast: 

Requested by:     Daria Ramirez                       BP:

113 mmHg/55 mmHg

Heart Rate:                                            Rhythm: 

Indication:       TIA/off anticoagulation for 1 week/look for embolic

source



Procedure Staff 

Ultrasound Technician:   Alie Palacios 

Reading Physician:       Chaim Lopez 

Requesting Provider: 



Conclusions:          Normal size left ventricle.  

The ejection fraction is estimated to be 25-30 %.  

There is a pacemaker lead noted in the right ventricle.  

The left atrium is mildly to moderately dilated.  

The right atrium is mildly to moderately dilated.  

Mild mitral annular calcification.  

Moderate mitral valve regurgitation is present.  

Aortic core valve in place. Mild perivalvular aortic regurgitation. AV

max PG is 11mmHG. AV mean

PG is 5mmHG..  

Moderate tricuspid regurgitation is present.  

No pericardial effusion.  

Compared to 2017, the degree of periprosthetic AR has decreased

slighlty.



Measurements: 

Chambers                   Valvular Assessment AV/MV          Valvular

Assessment TV/PV



Normal                                  Normal

Normal

Name         Value    Range             Name        Value Range

Name          Value Range

EF Range:    25-30 %                        AV meanP mmHg ( - )

TR Vmax:      2.96 mm/s ( - )



TR PGmax:     35 mmHg ( - )  

syst. PAP: 40 mmHg ( - )  



Continued Measurements: 

Valvular Assessment TV/PV  



Name                   Value  

CVP (est.):            5 mmHg   



Patient: FAINA EDWARDS                   MRN: [id number]

Study Date: 2017   Page 1 of 2

07:55 AM 









Findings: Left Ventricle: 

Normal size left ventricle. The ejection fraction is estimated to be

25-30 %.

Right Ventricle: 

There is a pacemaker lead noted in the right ventricle.  

Left Atrium: 

The left atrium is mildly to moderately dilated.  

Right Atrium: 

The right atrium is mildly to moderately dilated.  

Mitral Valve: 

Mild mitral annular calcification. Moderate mitral valve regurgitation

is present.

Aortic Valve: 

Aortic core valve in place. Mild perivalvular aortic regurgitation. AV

max PG is 11mmHG. AV mean

PG is 5mmHG..  

Tricuspid Valve: 

Moderate tricuspid regurgitation is present.  

Pericardium: 

No pericardial effusion. Left side pleural effusion. 







Electronically signed by Chaim Lopez on 2017 at 12:57 PM 

(No Signature Object) 



Patient: FAINA EDWARDS                   MRN: [id number]

Study Date: 2017   Page 2 of 2

07:55 AM 







[id number]

## 2017-11-24 NOTE — PDCARPN
Cardiology Progress Note


Assessment/Plan: 


Mr. Stover is an 83-year-old male with a complex cardiac history which 

includes CAD with a 3 vessel CABG in 2012 followed by PCI of the proximal 

portion of the LIMA to LAD graft in 2014. hypertension, paroxysmal atrial 

fibrillation, sick sinus syndrome with prior pacemaker implantation, 

cardiomyopathy, and valvular heart disease. He is now just 10 days status post 

a TAVR procedure. His post-TAVR hospital course was unremarkable and he was 

discharged to rehabilitation.





He was readmitted to the hospital on 11/22 due to concerns over a potential 

neurologic event. He manifested confusion he had a vague sense that something 

was wrong. He had no specific neurologic symptoms such as difficulty speaking 

or using his extremities. He was seen earlier in this hospital stay by Dr. Vasquez. 

He was felt to be stable.





We were contacted again today the hospitalist service to comment on his reduced 

ejection fraction on echocardiography compared to pre-TAVR assessments and to 

comment on management of his CHF. His ejection fraction was reported as 40-45% 

on echocardiography studies obtained prior to his valve procedure. His post-

TAVR echo demonstrated an ejection fraction of 25-30%. A repeat study today 

demonstrates similar findings. His bioprosthetic aortic valve demonstrates mild 

periprosthetic regurgitation. Other findings are unchanged. I have reviewed his 

echo studies both before and after his valve replacement. I think the apparent 

change in his ejection fraction is explained by the fact that prior to the 

procedure, he demonstrated atrial pacing with native conduction into the 

ventricles with a right bundle branch block pattern. I think his procedure had 

an impact on his A-V node function and he is now AV sequentially paced 100% of 

the time. This alters his septal activation and septal contractility does not 

appear to be as vigorous as prior to his procedure. I do not think there has 

been any intrinsic change or ischemic insult to his myocardium. He appears to 

be generally volume compensated. He is not on any cardioactive medication such 

as a beta blocker or ACE inhibitor. He has persistently low blood pressure 

which may limit introduction of such medications. There is no mandate to 

initiate these urgently. He should continue with his diuretic therapy. Hopefully

, relief of his significant aortic valve obstruction will allow his left 

ventricle to recover and his medication regimen can be adjusted gradually as an 

outpatient.





** One additional point, I am fairly certain that his particular pacemaker is 

MRI compatible. If it is felt that an MRI is important to his workup, I would 

want to confirm that with [employer] technical services prior to proceeding. **





11/24/17 18:02





Subjective: 





No complaints.


Reviewed/Discussed With: hospitalist


Objective: 





 Vital Signs (8 Hrs)











  Temp Pulse Resp BP Pulse Ox


 


 11/24/17 16:00  36.6 C  81  18  104/61  96


 


 11/24/17 12:00  36.6 C  79  18  96/54 L  97


 


 11/24/17 10:02   82   








 Intake/Output (24 Hrs)











 11/23/17 11/24/17 11/25/17





 05:59 05:59 05:59


 


Output Total  900 275


 


Balance  -900 -275


 


Output:   


 


  Urine (ml)  900 275


 


    Urinal  900 275


 


Other:   


 


  Number of Voids   


 


    Urinal  2 2











Result Diagrams: 


 11/24/17 04:41





 11/24/17 04:41





- Physical Exam


Constitutional: no apparent distress, other (Appears fatigued.)


Eyes: anicteric sclera


Ears, Nose, Mouth, Throat: moist mucous membranes


Cardiovascular: regular rate and rhythm, systolic murmur


Respiratory: other (crackles at right base)


Gastrointestinal: normoactive bowel sounds, no tenderness, no masses


Skin: no rashes, no edema


Neurologic: AAOx3


Psychiatric: not anxious





ICD10 Worksheet


Patient Problems: 


 Problems











Problem Status Onset


 


Altered mental status Acute  


 


Subtherapeutic international normalized ratio (INR) Acute  


 


Coronary artery bypass grafting Active  


 


Hematuria syndrome Active  


 


Nausea and vomiting Active  


 


Non-healing surgical wound Active  


 


Acute combined systolic and diastolic CHF, NYHA class 3 Acute  


 


Chest pain Acute  


 


Chronic Disease Management/Transitional Care Program Acute  


 


Coronary arteriosclerosis Acute  


 


Elevated bilirubin Acute  


 


Elevated troponin Acute  


 


Gastrointestinal bleeding, lower Acute  


 


S/P CABG x 3 Acute

## 2017-11-24 NOTE — ASMTCMCOM
CM Note

 

CM Note                       

Notes:

11/24/2017 Case Management Note



Reviewed chart, spoke w/MD.



Faxed referral to [facility].  Called Florencia, admission coordinator to discuss case.  Per 

Florencia, pt has had several stays at the facility and is well known to staff.  [facility] accepted 

pt and expect his return when he is medically stable.  Florencia plans to have staff meet with wife 

to address her concerns when pt returns to facility.



Case Management d/c poc:  Return to [facility] when medically stable.



Case Management available if needs change.

 

Date Signed:  11/24/2017 12:24 PM

Electronically Signed By:Juana Beauchamp RN

## 2017-11-25 NOTE — HOSPPROG
Hospitalist Progress Note


Assessment/Plan: 





*  Recurrent spells of amnesia - ? seizure


   -PCM is MRI compatible device - contacting PCM rep to proceed with MRI


   -would do MRI with and without contrast given h/o malignancy


   -d/w Dr. Seay - consider Keppra


   -check BP during next event


*  s/p TAVR, h/o previous bioprosthetic AVR


*  Factor V Leiden with recurrent PE/DVT


   -resume lovenox if INR < 2


*  Afib/PCM


*  Acute on chronic systolic CHF - EF 25% - newly reduced


   -lower EF post TAVR probably a changed conduction effect


   -consider start ACEI/beta-blocker if BP tolerates


*  CAD/CABG


*  Loculated right pleural effusion


   -progressive since October


   -d/w Dr. Llanes - pulmonary to consult


*  chronic respiratory failure 2L


*  RA


*  Metastatic prostate cancer


   -enlarging LAD by CT


   -continue Xtandi


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


*  h/o melanoma s/p resection














Subjective: Still having intermitten episodes of acute memory loss


Objective: 


 Vital Signs











Temp Pulse Resp BP Pulse Ox


 


 36.8 C   84   22 H  104/57 L  90 L


 


 11/25/17 08:00  11/25/17 09:39  11/25/17 08:00  11/25/17 08:00  11/25/17 08:00








 Laboratory Results





 11/25/17 04:39 





 11/25/17 04:39 





 











 11/24/17 11/25/17 11/26/17





 05:59 05:59 05:59


 


Intake Total  850 250


 


Output Total 900 775 375


 


Balance -900 75 -125








 











PT  29.7 SEC (12.0-15.0)  H  11/25/17  04:39    


 


INR  2.78  (0.83-1.16)  H  11/25/17  04:39    








CXR viewed, my personal interpretation is - increasing effusion on right


CT chest - progressive loculation since Oct


d/w Dawood Gamboa and Mario Alberto








- Physical Exam


Constitutional: no apparent distress, appears nourished, not in pain


Cardiovascular: regular rate and rhythym, no murmur, rub, or gallop


Respiratory: no respiratory distress, no rales or rhonchi, clear to auscultation


Gastrointestinal: normoactive bowel sounds, soft, non-tender abdomen, no 

palpable masses


Skin: no rashes or abrasions, no fluctuance, no induration


Neurologic: AAOx3, sensation intact bilaterally


Psychiatric: interacting appropriately, not anxious, not encephalopathic, 

thought process linear





ICD10 Worksheet


Patient Problems: 


 Problems











Problem Status Onset


 


Altered mental status Acute  


 


Subtherapeutic international normalized ratio (INR) Acute  


 


Coronary artery bypass grafting Active  


 


Hematuria syndrome Active  


 


Nausea and vomiting Active  


 


Non-healing surgical wound Active  


 


Acute combined systolic and diastolic CHF, NYHA class 3 Acute  


 


Chest pain Acute  


 


Chronic Disease Management/Transitional Care Program Acute  


 


Coronary arteriosclerosis Acute  


 


Elevated bilirubin Acute  


 


Elevated troponin Acute  


 


Gastrointestinal bleeding, lower Acute  


 


S/P CABG x 3 Acute

## 2017-11-25 NOTE — PDCARPN
Cardiology Progress Note


Assessment/Plan: 


Mr. Stover is an 83-year-old male with a complex cardiac history which 

includes CAD with a 3 vessel CABG in 2012 followed by PCI of the proximal 

portion of the LIMA to LAD graft in 2014. hypertension, paroxysmal atrial 

fibrillation, sick sinus syndrome with prior pacemaker implantation, 

cardiomyopathy, and valvular heart disease. He is now status post a TAVR 

procedure on 11/13.





He was readmitted to the hospital on 11/22 due to concerns over a potential 

neurologic event. He manifested confusion he had a vague sense that something 

was wrong. He had no specific neurologic symptoms such as difficulty speaking 

or using his extremities. 





Valvular Heart Disease- s/p TAVR for severe aortic stenosis; echo from 11/24 

demonstrates normal TAVR prosthesis function with mild periprosthetic 

regurgitation.





Cardiomyopathy/Chronic Systolic CHF- not grossly volume overloaded; does have 

increasing right sided pleural effusion with possible loculation. Hospitalist 

service plans for CT chest to day to eval for pneumonia with parapneumonic 

effusion. Pre-TAVR LVEF reported as 40-45%; post-TAVR LVEF appears to 25-30%. I 

believe this is based on change in his conduction status. Pre-TAVR his rhythm 

was A-pace/V-sense with RBBB pattern. Now his rhythm is A-pace/V-pace which 

changes his septal depolarization.


- Will start low dose lisinopril.


- An appropriate drug regimen for CMP/CHF can be built slowly as allowed by his 

usually low BP.





Coronary Artery Disease- h/o 3 vessel CABG and PCIs. Cardiac cath on 11/6 

demonstrated severe left main and multi-vessel CAD with patent LIMA to LAD, SVG 

to large OM branch, and SVG to distal RCA.





Paroxysmal Atrial Fibrillation- no episodes of A-fib on tele; continues on 

systemic anticoag for stroke prophylaxis and h/o Factor V Leiden with prior DVTs

/PEs.





Sick Sinus Syndrome- h/o Biotronik dual chamber pacer implanted in January 2017.


** Note: I am fairly certain that his particular pacemaker is MRI compatible. 

If it is felt that an MRI is important to his workup, I would want to confirm 

that with [employer] technical services prior to proceeding. **











11/25/17 10:13





Subjective: 





? some dyspnea this a.m.


Reviewed/Discussed With: hospitalist


Objective: 





 Vital Signs (8 Hrs)











  Temp Pulse Resp BP Pulse Ox


 


 11/25/17 09:39   84   


 


 11/25/17 08:00  36.8 C  89  22 H  104/57 L  90 L


 


 11/25/17 03:34  36.8 C  75  16  101/59 L  96








 Intake/Output (24 Hrs)











 11/24/17 11/25/17 11/26/17





 05:59 05:59 05:59


 


Intake Total  850 


 


Output Total 900 775 


 


Balance -900 75 


 


Intake:   


 


  Oral (ml)  850 


 


Output:   


 


  Urine (ml) 900 775 


 


    Urinal 900 775 


 


Other:   


 


  Intake Quantity  Yes 





  Sufficient   


 


  Number of Voids   


 


    Urinal 2 1 











Result Diagrams: 


 11/25/17 04:39





 11/25/17 04:39





- Physical Exam


Constitutional: no apparent distress


Eyes: anicteric sclera


Ears, Nose, Mouth, Throat: moist mucous membranes


Cardiovascular: regular rate and rhythm, systolic murmur


Respiratory: other (decreased breath sounds at right base)


Gastrointestinal: normoactive bowel sounds, no tenderness, no masses


Skin: no edema


Neurologic: AAOx3


Psychiatric: not anxious





ICD10 Worksheet


Patient Problems: 


 Problems











Problem Status Onset


 


Coronary artery bypass grafting Active  


 


Nausea and vomiting Active  


 


Non-healing surgical wound Active  


 


Hematuria syndrome Active  


 


Gastrointestinal bleeding, lower Acute  


 


Chronic Disease Management/Transitional Care Program Acute  


 


Acute combined systolic and diastolic CHF, NYHA class 3 Acute  


 


Coronary arteriosclerosis Acute  


 


S/P CABG x 3 Acute  


 


Chest pain Acute  


 


Elevated troponin Acute  


 


Elevated bilirubin Acute  


 


Subtherapeutic international normalized ratio (INR) Acute  


 


Altered mental status Acute

## 2017-11-25 NOTE — NEUROPROG
Assessment: 


1. Encephalopathy


2. Status post TAVR


3. Atrial fibrillation, on warfarin with therapeutic INR





The patient continues to have some waxing and waning short-term memory 

difficulties.  No focal neurologic symptoms.  I spoke to his brother at length 

today, who is a neurosurgeon, there are no brief episodes of alteration of 

awareness or episodic symptoms in general to suggest focal seizures at this 

time.  No convulsive activity.





Overall, it is not clear what is causing his short-term memory dysfunction.  It 

may be a toxic metabolic encephalopathy, he may have had a cerebral infarct 

causing structural memory symptoms, or he may have had a silent cerebral 

infarct with secondary seizures.  There is no strong evidence for seizures now.





We are checking with the pacemaker manufacture to see if he would be able to 

get an MRI brain to exclude any acute infarct or mass lesions.  The patient 

does have a history of cancer.  Will follow up on the above.





Going forward, I will see him back in the office in 2-3 weeks for clinical 

reassessment.  Based on his overall clinical picture and evolution of symptoms, 

we may consider a EEG at that time.





35 total minutes floor time; reviewing interval history, medical notes and in 

direct counseling with the patient and his family;  Along with coordination of 

care


Subjective: 


Ongoing memory symptoms


Objective: 





 Vital Signs











Temp Pulse Resp BP Pulse Ox


 


 36.8 C   84   22 H  104/57 L  90 L


 


 11/25/17 08:00  11/25/17 09:39  11/25/17 08:00  11/25/17 08:00  11/25/17 08:00








 Laboratory Results





 11/25/17 04:39 





 11/25/17 04:39 





 











 11/24/17 11/25/17 11/26/17





 05:59 05:59 05:59


 


Intake Total  850 250


 


Output Total 900 775 375


 


Balance -900 75 -125








 











PT  29.7 SEC (12.0-15.0)  H  11/25/17  04:39    


 


INR  2.78  (0.83-1.16)  H  11/25/17  04:39    








Awake and alert


The patient did repeat a question 2 or 3 times consistent with short-term 

memory dysfunction


Language was grossly normal


Allergies/Adverse Reactions: 


 





daptomycin Allergy (Verified 10/02/17 21:01)

## 2017-11-26 NOTE — HOSPPROG
Hospitalist Progress Note


Assessment/Plan: 





*  Amnesia - ? CVA vs. metabolic encephalopathy vs. transient global amnesia


   -PCM is MRI compatible device - check MRI brain with and without contrast


*  s/p TAVR, h/o previous bioprosthetic AVR


*  Factor V Leiden with recurrent PE/DVT


   -resume lovenox if INR < 2


*  Afib/PCM


*  Acute on chronic systolic CHF - EF 25% - newly reduced


   -lower EF post TAVR probably a changed conduction effect


   -consider start ACEI/beta-blocker if BP tolerates


*  CAD/CABG/stent


*  Loculated right pleural effusion


   -progressive since October


   -d/w Dr. Ga - thoracentesis indicated - he will arrange


*  chronic respiratory failure 2L


*  RA


*  Metastatic prostate cancer


   -enlarging LAD by CT


   -continue Xtandi


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


*  h/o melanoma s/p resection














Subjective: Wife states he retains some higher functions such as ability to do 

math.   But basic memories he can not access, for example he could not remember 

his usual cardiologist, Dr. Rushing, who he usually adores.


Objective: 


 Vital Signs











Temp Pulse Resp BP Pulse Ox


 


 36.6 C   71   17   90/54 L  95 


 


 11/26/17 08:00  11/26/17 08:00  11/26/17 08:00  11/26/17 08:00  11/26/17 12:00








 Microbiology











 11/25/17 14:45  - Final





 Sputum, Expectorated 








 Laboratory Results





 11/26/17 05:11 





 11/26/17 05:11 





 











 11/25/17 11/26/17 11/27/17





 05:59 05:59 05:59


 


Intake Total 850 1150 380


 


Output Total 775 625 100


 


Balance 75 525 280








 











PT  35.4 SEC (12.0-15.0)  H  11/26/17  05:11    


 


INR  3.46  (0.83-1.16)  H  11/26/17  05:11    








d/w Dr. ga regarding effusion - thoracentesis recommended - Dr. Ga to 

arrange








- Physical Exam


Constitutional: no apparent distress, appears nourished, not in pain


Cardiovascular: regular rate and rhythym, no murmur, rub, or gallop


Respiratory: no respiratory distress, no rales or rhonchi, clear to auscultation


Gastrointestinal: normoactive bowel sounds, soft, non-tender abdomen, no 

palpable masses


Skin: no rashes or abrasions, no fluctuance, no induration


Neurologic: AAOx3, sensation intact bilaterally


Psychiatric: interacting appropriately, not anxious, flat affect, poor memory, 

No agitated





ICD10 Worksheet


Patient Problems: 


 Problems











Problem Status Onset


 


Altered mental status Acute  


 


Subtherapeutic international normalized ratio (INR) Acute  


 


Coronary artery bypass grafting Active  


 


Hematuria syndrome Active  


 


Nausea and vomiting Active  


 


Non-healing surgical wound Active  


 


Acute combined systolic and diastolic CHF, NYHA class 3 Acute  


 


Chest pain Acute  


 


Chronic Disease Management/Transitional Care Program Acute  


 


Coronary arteriosclerosis Acute  


 


Elevated bilirubin Acute  


 


Elevated troponin Acute  


 


Gastrointestinal bleeding, lower Acute  


 


S/P CABG x 3 Acute

## 2017-11-26 NOTE — NEUROPROG
Assessment: 


1. Encephalopathy


2. Status post TAVR


3. History of paroxysmal Atrial fibrillation and factor 5 Leiden, on warfarin 

with therapeutic INR








The pacemaker  has confirmed that his device is compatible with MRI 

brain.  MRI brain with without contrast has been ordered for today.





His mental status is similar or improving compared to yesterday.  He scored 2/3 

in terms of delayed recall.  But there is certainly some troubles forming 

memory during casual conversation.  No other focal findings such as language 

dysfunction, dysarthria or focal weakness.





The plan going forward will be to obtain the MRI brain with and without 

contrast hopefully today.  I will review the images once they are complete and 

communicate with the primary team regarding further recommendations.  For 

example, if there has been a acute infarct in the mesial temporal region - 

there would be no specific management changes now in regards to stroke 

intervention.  The patient had CTA of the head and neck initially which showed 

no large vessel occlusion to indicate a mechanical thrombectomy.  In addition, 

his INR therapeutic.  Therefore, it would be the knowledge that would be 

helpful in terms of designing outpatient cognitive therapies etc.  If there is 

no stroke or mass lesions, then the working diagnosis would be a metabolic type 

encephalopathy.  In this scenario, we would expect his cognition to slowly 

improve over the next weeks.





In either case, I will see the patient in 2-3 weeks as an outpatient to 

reassess the entire clinical picture.  We will also consider other testing, 

such as EEG, at that time based on how he is progressing.








35 total minutes floor time; reviewing interval history, medical notes and in 

direct counseling with the patient and his family;  Along with coordination of 

care


Subjective: 


Cognition may be slightly improving


Objective: 





 Vital Signs











Temp Pulse Resp BP Pulse Ox


 


 36.6 C   71   17   90/54 L  99 


 


 11/26/17 08:00  11/26/17 08:00  11/26/17 08:00  11/26/17 08:00  11/26/17 08:00








 Microbiology











 11/25/17 14:45  - Final





 Sputum, Expectorated 








 Laboratory Results





 11/26/17 05:11 





 11/26/17 05:11 





 











 11/25/17 11/26/17 11/27/17





 05:59 05:59 05:59


 


Intake Total 850 1150 


 


Output Total 775 625 100


 


Balance 75 525 -100








 











PT  35.4 SEC (12.0-15.0)  H  11/26/17  05:11    


 


INR  3.46  (0.83-1.16)  H  11/26/17  05:11    








   He scored 2/3 in terms of delayed recall.  But there is certainly some 

troubles forming memory during casual conversation.  No other focal findings 

such as language dysfunction, dysarthria or focal weakness.  No sensory loss.





Allergies/Adverse Reactions: 


 





daptomycin Allergy (Verified 10/02/17 21:01)

## 2017-11-26 NOTE — PDCARPN
Cardiology Progress Note


Assessment/Plan: 


Mr. Stover is an 83-year-old male with a complex cardiac history which 

includes CAD with a 3 vessel CABG in 2012 followed by PCI of the proximal 

portion of the LIMA to LAD graft in 2014. hypertension, paroxysmal atrial 

fibrillation, sick sinus syndrome with prior pacemaker implantation, 

cardiomyopathy, and valvular heart disease. He is now status post a TAVR 

procedure on 11/13.





He was readmitted to the hospital on 11/22 due to concerns over a potential 

neurologic event. He manifested confusion he had a vague sense that something 

was wrong. He had no specific neurologic symptoms such as difficulty speaking 

or using his extremities. 





Valvular Heart Disease- s/p TAVR for severe aortic stenosis; echo from 11/24 

demonstrates normal TAVR prosthesis function with mild periprosthetic 

regurgitation.





Cardiomyopathy/Chronic Systolic CHF- not grossly volume overloaded; does have 

increasing right sided pleural effusion with possible loculation. Hospitalist 

service plans for CT chest to day to eval for pneumonia with parapneumonic 

effusion. Pre-TAVR LVEF reported as 40-45%; post-TAVR LVEF appears to 25-30%. I 

believe this is based on change in his conduction status. Pre-TAVR his rhythm 

was A-pace/V-sense with RBBB pattern. Now his rhythm is A-pace/V-pace which 

changes his septal depolarization.


- Continue current diuretic dosing.


- Would like to start low dose lisinopril but SBP was 90 mmHg earlier today.


- An appropriate drug regimen for CMP/CHF can be built slowly as allowed by his 

usually low BP.





Coronary Artery Disease- h/o 3 vessel CABG and PCIs. Cardiac cath on 11/6 

demonstrated severe left main and multi-vessel CAD with patent LIMA to LAD, SVG 

to large OM branch, and SVG to distal RCA.


- No angina symptoms.





Paroxysmal Atrial Fibrillation- no episodes of A-fib on tele; continues on 

systemic anticoag for stroke prophylaxis and h/o Factor V Leiden with prior DVTs

/PEs.





Sick Sinus Syndrome- h/o Biotronik dual chamber pacer implanted in January 2017.


** Note: spoke with one of our elctrophysiologists. His particular pacemaker is 

MRI compatible. Unlike some other 's devices, his Biotronik Eluna 

pacemaker does NOT require special pre- and post-MRI programming changes. **














11/26/17 13:20





Subjective: 





No complaints.


Reviewed/Discussed With: family, hospitalist


Objective: 





 Vital Signs (8 Hrs)











  Temp Pulse Resp BP Pulse Ox


 


 11/26/17 08:00  36.6 C  71  17  90/54 L  99








 Intake/Output (24 Hrs)











 11/25/17 11/26/17 11/27/17





 05:59 05:59 05:59


 


Intake Total 850 1150 


 


Output Total 775 625 100


 


Balance 75 525 -100


 


Intake:   


 


  Oral (ml) 850 1150 


 


Output:   


 


  Urine (ml) 775 625 100


 


    Urinal 775 625 100


 


Other:   


 


  Intake Quantity Yes  





  Sufficient   


 


  Output Comment   


 


    Urinal  stool with mucus 


 


  Number of Voids   


 


    Urinal 1 2 


 


  Number of Stools   


 


    Urinal  1 











Result Diagrams: 


 11/26/17 05:11





 11/26/17 05:11





- Physical Exam


Constitutional: no apparent distress


Eyes: anicteric sclera


Ears, Nose, Mouth, Throat: moist mucous membranes


Cardiovascular: regular rate and rhythm, systolic murmur


Respiratory: other (decreased at right base)


Gastrointestinal: normoactive bowel sounds, no tenderness, no masses


Skin: other (trace edema)


Neurologic: AAOx3


Psychiatric: not anxious





ICD10 Worksheet


Patient Problems: 


 Problems











Problem Status Onset


 


Altered mental status Acute  


 


Subtherapeutic international normalized ratio (INR) Acute  


 


Coronary artery bypass grafting Active  


 


Hematuria syndrome Active  


 


Nausea and vomiting Active  


 


Non-healing surgical wound Active  


 


Acute combined systolic and diastolic CHF, NYHA class 3 Acute  


 


Chest pain Acute  


 


Chronic Disease Management/Transitional Care Program Acute  


 


Coronary arteriosclerosis Acute  


 


Elevated bilirubin Acute  


 


Elevated troponin Acute  


 


Gastrointestinal bleeding, lower Acute  


 


S/P CABG x 3 Acute

## 2017-11-26 NOTE — GCON
[f rep st]



                                                                    CONSULTATION





PULMONARY CONSULTATION



DATE OF CONSULTATION:  11/26/2017





REASON FOR CONSULTATION:  Right-sided pleural effusion.



HISTORY:  The patient is a very pleasant 83-year-old who is known to me from the office and distant 
ospital admissions.  He has multiple medical problems, including a recent TAVR, previous pulmonary em
boli and DVT associated with Factor V Leiden deficiency, on chronic anticoagulation, congestive heart
 failure, atrial fibrillation, systemic hypertension, gastroesophageal reflux disease, chronic renal 
insufficiency, rheumatoid arthritis, sarcoma of the left chest, etc. 



I have not seen him in the office for at least 5 years.  As I recall, he has significant restrictive 
disease from previous sarcoma.  This was diagnosed in the late 1970s in the left chest.  He was opera
miki on in [place], and subsequently at [facility], when he recurred.  This was then followed by dee ny and external beam radiation therapy.  No chemotherapy was given.  I saw him intermittent
ly in the office after that.  Occasional CT scans showed no evidence of recurrent sarcoma. 



He was admitted to the hospital on this occasion on 11/22 with increasing confusion and somnolence.  
There was no evidence of a stroke.  He has been seen by both Neurology and Cardiology.  A CT scan of 
the chest was obtained, which shows a moderate partially loculated pleural effusion on the right side
.  This was present a month ago and is only slightly larger compared to that CAT scan.  Pleural effus
ions date back to at least 2012, but these effusions have been small.  Earlier this year, a CT scan o
f the chest showed only a small amount of pleural fluid, also partially loculated, in the same areas 
where they are seen currently. 



He does have a history of both congestive heart failure and metastatic cancer of the prostate.



PAST MEDICAL HISTORY:  Extensive and is as briefly outlined above in the HPI and as noted in his curr
ent and past records.



PAST SURGICAL HISTORY:  Extensive, with his most recent surgery being a TAVR done at [facility]
y 2 weeks ago.  He has had sarcoma resected from his left chest on 2 occasions in the late 1990s.  I 
do not believe he has ever had right-sided chest surgery; however, he may have had rib fractures on t
hat side.



CURRENT MEDICATIONS:  Digoxin, Lasix, Mucinex, Synthroid, pantoprazole, potassium, Crestor, Flomax, R
estoril, and warfarin.  He was on Xtandi as an outpatient for his prostate cancer.  This is not curre
ntly being given.



SOCIAL HISTORY:  The patient is  and [place] of [place].  He has a house in [place].  He was e
xtremely active in the past, a long-time skier, etc.  He is a never-smoker.  Significant alcohol is n
egative.



FAMILY HISTORY:  Noncontributory.



REVIEW OF SYSTEMS:  10-point review of systems is negative except as mentioned in the HPI.  He does h
ave some chronic shortness of breath, fatigue, lower extremity edema, etc., related to his multiple c
hronic medical problems.



PHYSICAL EXAMINATION:  GENERAL:  A very pleasant gentleman who is lying comfortably in bed.  VITAL SI
GNS:  Oxygen is in place at 2 L with saturations of 98%.  Blood pressure is 105/75, respiratory rate 
approximately 20.  He is afebrile.  HEENT:  Unremarkable for lymphadenopathy or thyromegaly.  There i
s no obvious jugular venous distention.  LUNGS:  Breath sounds are diminished bilaterally, more so on
 the right than the left.  There are few rales at the left base.  With cough, he has a few central rh
onchi with clear expectorated mucus.  There are no wheezes.  HEART:  Regular, paced on the monitor.  
A systolic murmur is present, possible gallop.  ABDOMEN:  Soft and nontender.  Bowel sounds are prese
nt.  EXTREMITIES:  Are remarkable for 1+ edema.  NEUROLOGIC:  Intact.  He is globally weak but nonfoc
al.  Mentation appears to be intact.  He recalls more distant events fairly well, with some slight co
nfusion.



DATABASE:  CT scan of the chest is as outlined above.



LABORATORY:  White blood cell count is 2900, hematocrit 31.4, platelet 122,000.  Today's INR is eleva
miki at 3.46.  Basic metabolic panel is within normal limits.



ASSESSMENT AND PLAN:  Pleural effusion.  He does have a small to moderate pleural effusion that is pa
rtially loculated, but all loculations may communicate with one another?  He has no chest pain, no in
fectious symptoms.  He does have an extensive pulmonary history with restrictive lung disease and a s
arcoma resected on 2 occasions from the left chest, not the right.  This was followed by radiation th
sylvia on the left.  Over the years, he has had chronic changes and volume loss with atelectasis of th
e left lung, but no evidence of recurrent sarcoma.  The pleural effusion on the right has been chroni
c, present at least since 2012, but over the last several months, his pleural effusion has definitely
 increased.  Etiologies possibly include congestive heart failure with loculations secondary to previ
ous pleural scarring, possibly metastatic prostate cancer, and possible other etiologies, including a
 rheumatoid effusion, et cetera.  Infection seems distinctly unlikely.  A diagnostic thoracentesis is
 indicated.  However, with his elevated INR above 3, this needs to be done with fresh frozen plasma. 
 This will be arranged with Radiology under ultrasound guidance for tomorrow.  2 units of fresh froze
n plasma will be requested, but held and given just before and during his thoracentesis.  Appropriate
 studies will be sent, including cytologies, cultures, cell count and differential, chemistries, etc.
 



Further plans and recommendations will be made based on the results.





Job #:  [id number]

## 2017-11-27 NOTE — SOAPPROG
SOAP Progress Note


Assessment/Plan: 


Assessment:


1. sp TAVR


2. AFIB


3. Cardiomyopathy


4. Factor V Leiden mutation


5. ? neurological event








Impression:  Stable cardiovascular status post replacement of the aortic valve.

  Persistent cardiomyopathy on good medical therapy.  He appears compensated 

without evidence of congestive heart failure.  He remains weak.  Today 

neurologically he appears to be alert oriented and appropriate.


Current medical therapy.


Considerations for upgrade to biventricular pacing if ejection fraction not 

improving.

















Plan:





11/27/17 11:36





11/27/17 11:48





Subjective: 


Feeling weak.  No shortness of breath, PND orthopnea no chest pain.





Objective: 





Medications











Generic Name Dose Route Start Last Admin





  Trade Name Freq  PRN Reason Stop Dose Admin


 


Digoxin  125 mcg  11/23/17 10:00  11/27/17 09:30





  Lanoxin  PO  05/22/18 09:59  125 mcg





  DAILY10 [facility]   


 


Rosuvastatin Calcium  10 mg  11/23/17 09:00  11/27/17 09:30





  Crestor  PO  05/22/18 08:59  10 mg





  DAILY [facility]   


 


Tamsulosin HCl  0.4 mg  11/23/17 09:00  11/27/17 09:30





  Flomax  PO  05/22/18 08:59  0.4 mg





  DAILY [facility]   


 


Furosemide  40 mg  11/27/17 09:00  11/27/17 09:33





  Lasix  PO  05/26/18 08:59  40 mg





  DAILY [facility]   


 


Levothyroxine Sodium  150 mcg  11/23/17 06:00  11/27/17 05:43





  Synthroid  PO  05/22/18 05:59  150 mcg





  DAILY06 [facility]   


 


Warfarin Sodium  1 each  11/23/17 09:00  





  Warfarin Pharmacy To Dose  MISC  05/22/18 08:59  





  AD [facility]   





  Protocol   











 Vital Signs











Temp Pulse Resp BP Pulse Ox


 


 36.9 C   74   21 H  122/65 H  95 


 


 11/27/17 09:18  11/27/17 09:30  11/27/17 09:18  11/27/17 09:18  11/27/17 09:18








 Microbiology











 11/25/17 14:45  - Final





 Sputum, Expectorated 








 Laboratory Results





 11/27/17 04:35 





 11/27/17 04:35 





 











 11/26/17 11/27/17 11/28/17





 05:59 05:59 05:59


 


Intake Total 1150 1180 


 


Output Total 625 600 


 


Balance 525 580 








 











PT  33.2 SEC (12.0-15.0)  H  11/27/17  04:35    


 


INR  3.20  (0.83-1.16)  H  11/27/17  04:35    











 Laboratory Tests











  11/24/17 11/27/17





  04:41 04:35


 


NT-Pro-B Natriuret Pep   6250 H


 


LDL Cholesterol, Calc  71 L 


 


TSH  5.000 H 








Neuro consult reviewed.








ICD10 Worksheet


Patient Problems: 


 Problems











Problem Status Onset


 


Coronary artery bypass grafting Active  


 


Nausea and vomiting Active  


 


Non-healing surgical wound Active  


 


Hematuria syndrome Active  


 


Gastrointestinal bleeding, lower Acute  


 


Chronic Disease Management/Transitional Care Program Acute  


 


Acute combined systolic and diastolic CHF, NYHA class 3 Acute  


 


Coronary arteriosclerosis Acute  


 


S/P CABG x 3 Acute  


 


Chest pain Acute  


 


Elevated troponin Acute  


 


Elevated bilirubin Acute  


 


Subtherapeutic international normalized ratio (INR) Acute  


 


Altered mental status Acute  














Past Medical History





- Personal History


Current Tetanus Diphtheria and Acellular Pertussis (TDAP): Yes


Tetanus Vaccine Date: 2007





- Medical/Surgical History


Hx Asthma: No


Hx Chronic Respiratory Disease: Yes


Hx Cardiac Disease: Yes


Hx Diabetes: No


Hx Renal Disease: No


Hx Alcoholism: No


Hx Cirrhosis: No


Hx HIV/AIDS: No


Hx Splenectomy or Spleen Trauma: No


Other PMH: BONE CA,  L LUNG REMOVAL, HIP SURG.  TRIPLE BYPASS 2012. Mets/bone, 

CHF, COPD, MI, stents 2014, PE, severe AS, pacemaker 1/17, Aortic valve 

replacement





- Social History


Smoking Status: Never smoked


Drug Use: None





Review of Systems





- Review of Systems


Constitutional: weakness.  denies: chills, fever


EENTM: no symptoms reported


Respiratory: no symptoms reported


Cardiac: no symptoms reported


Gastrointestinal/Abdominal: no symptoms reported


Genitourinary: no symptoms


Musculoskelatal: no symptoms


Skin: no symptoms


Neurological: no symptoms


Hematologic/Lymphatic: no symptoms reported


Immunologic/allergic: no symptoms reported

## 2017-11-27 NOTE — NEUROPROG
Assessment: 


25 minutes of total unit time to address the encephalopathy and possible 

episode of transient global amnesia, which is not clearly evident at this 

point. Hopefully, this is resolving but MRI pending to be sure no stroke is 

evident.





Addendum: MRI shows small acute stroke in the left cerebellar hemisphere. It is 

no likely causing current symptoms. There may have been an embolism that led to 

initial symptoms and then resolved with no residual tissue damage except in the 

cerebellum. In any case, we cannot know for sure and he is now recovering well. 

Maintaining the anticoagulation is the plan assuming no more need for 

interventional procedures. I spent an additional 20 more minutes of unit time 

in reviewing all of this and discussions with family and radiology, Dr. Hay, 

who feels this is stoke. I also discussed with Dr. Doe.





Total unit time today of 45 minutes.


Objective: 





 Vital Signs











Temp Pulse Resp BP Pulse Ox


 


 36.6 C   72   18   110/77   96 


 


 11/27/17 19:16  11/27/17 19:16  11/27/17 19:16  11/27/17 19:16  11/27/17 19:16








 Microbiology











 11/25/17 14:45  - Final





 Sputum, Expectorated Sputum Culture - Final








 Laboratory Results





 11/27/17 04:35 





 11/27/17 04:35 





 











 11/26/17 11/27/17 11/28/17





 05:59 05:59 05:59


 


Intake Total 1150 1180 1500


 


Output Total 625 600 850


 


Balance 525 580 650








 











PT  33.2 SEC (12.0-15.0)  H  11/27/17  04:35    


 


INR  3.20  (0.83-1.16)  H  11/27/17  04:35    











Allergies/Adverse Reactions: 


 





daptomycin Allergy (Verified 10/02/17 21:01)

## 2017-11-27 NOTE — ASMTCMCOM
CM Note

 

CM Note                       

Notes:

11/27/2017 Case Management Note



Faxed updates to [facility].  PT continues to recommend SNF rehab.



Case Management d/c poc:  return to [facility] when medically stable.



Case Management to follow.

 

Date Signed:  11/27/2017 09:45 AM

Electronically Signed By:Juana Beauchamp RN

## 2017-11-27 NOTE — HOSPPROG
Hospitalist Progress Note


Assessment/Plan: 





84 yo M w recent TAVR admitted w confusion 








Amnesia - ? CVA vs. metabolic encephalopathy vs. transient global amnesia


   -PCM is MRI compatible device - check MRI brain with and without contrast


   MRI w small cerebellar stroke. this doesnt account for sx





   I believe his sx caused by encephalopathy in high functioning patient


      waxing/waning aspect c/w encephalopathy


? pneumothorax: repeat cxr in AM





s/p TAVR, h/o previous bioprosthetic AVR





Factor V Leiden with recurrent PE/DVT


   -resume lovenox if INR < 2


   inr 3.2 today





Afib/PCM





Acute on chronic systolic CHF - EF 25% - newly reduced


   -lower EF post TAVR probably a changed conduction effect


   -consider start ACEI/beta-blocker if BP tolerates





CAD/CABG/stent





Loculated right pleural effusion


   -progressive since October


   -s/p thoracentesis





chronic respiratory failure 2L





RA





Metastatic prostate cancer


   -enlarging LAD by CT


   -continue Xtandi





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





h/o melanoma s/p resection











Subjective: case d/w dr salgado.  cxr w likely small R sided pneumothorax 

post procedure (inter by me)


Objective: 


 Vital Signs











Temp Pulse Resp BP Pulse Ox


 


 36.4 C   69   24 H  107/60   99 


 


 11/27/17 13:35  11/27/17 13:35  11/27/17 13:35  11/27/17 13:35  11/27/17 13:35








 Microbiology











 11/25/17 14:45  - Final





 Sputum, Expectorated Sputum Culture - Final








 Laboratory Results





 11/27/17 04:35 





 11/27/17 04:35 





 











 11/26/17 11/27/17 11/28/17





 05:59 05:59 05:59


 


Intake Total 1150 1180 


 


Output Total 625 600 650


 


Balance 525 580 -650








 











PT  33.2 SEC (12.0-15.0)  H  11/27/17  04:35    


 


INR  3.20  (0.83-1.16)  H  11/27/17  04:35    














- Physical Exam


Constitutional: no apparent distress


Eyes: PERRL, anicteric sclera


Ears, Nose, Mouth, Throat: moist mucous membranes, hearing normal


Cardiovascular: regular rate and rhythym, no murmur, rub, or gallop, systolic 

murmur


Respiratory: no respiratory distress, no rales or rhonchi


Gastrointestinal: normoactive bowel sounds, soft, non-tender abdomen


Genitourinary: no bladder fullness, No loya in urethra


Skin: warm, normal color


Musculoskeletal: full muscle strength


Neurologic: other (slight confusion. was less confused this AM per reports), No 

AAOx3





ICD10 Worksheet


Patient Problems: 


 Problems











Problem Status Onset


 


Altered mental status Acute  


 


Subtherapeutic international normalized ratio (INR) Acute  


 


Coronary artery bypass grafting Active  


 


Hematuria syndrome Active  


 


Nausea and vomiting Active  


 


Non-healing surgical wound Active  


 


Acute combined systolic and diastolic CHF, NYHA class 3 Acute  


 


Chest pain Acute  


 


Chronic Disease Management/Transitional Care Program Acute  


 


Coronary arteriosclerosis Acute  


 


Elevated bilirubin Acute  


 


Elevated troponin Acute  


 


Gastrointestinal bleeding, lower Acute  


 


S/P CABG x 3 Acute

## 2017-11-27 NOTE — NEUROPROG
Assessment: 


25 minutes of total unit time to address the encephalopathy and possible 

episode of transient global amnesia, which is not clearly evident at this 

point. Hopefully, this is resolving but MRI pending to be sure no stroke is 

evident.


Subjective: 


Pt case reviewed from records, the nurse and the patient. He reports no current 

complaints. Nurse says that he has decreased short term memory issues on ly at 

this point.


Objective: 





 Vital Signs











Temp Pulse Resp BP Pulse Ox


 


 36.9 C   74   21 H  122/65 H  95 


 


 11/27/17 09:18  11/27/17 09:18  11/27/17 09:18  11/27/17 09:18  11/27/17 09:18








 Microbiology











 11/25/17 14:45  - Final





 Sputum, Expectorated 








 Laboratory Results





 11/27/17 04:35 





 11/27/17 04:35 





 











 11/26/17 11/27/17 11/28/17





 05:59 05:59 05:59


 


Intake Total 1150 1180 


 


Output Total 625 600 


 


Balance 525 580 








 











PT  33.2 SEC (12.0-15.0)  H  11/27/17  04:35    


 


INR  3.20  (0.83-1.16)  H  11/27/17  04:35    








Alert oriented to person place and time and can make jokes and is mainly 

focused on questions about his pleural effusion


Allergies/Adverse Reactions: 


 





daptomycin Allergy (Verified 10/02/17 21:01)

## 2017-11-28 NOTE — SOAPPROG
SOAP Progress Note


Assessment/Plan: 


Assessment:


1. sp TAVR


2. AFIB


3. Cardiomyopathy. EF 25%


4. Factor V Leiden mutation


5. neurological event/resolved. Non-clinical CVA on imaging.











11/28/17 10:42


Impression:  Improvement in mental status today.  Patient is clearly more 

awake.  Chronic fatigue unchanged.  No evidence of congestive heart failure.  

Tolerating more aggressive medical therapy icluding low dose beta-blocker and 

low dose ace..


Review of patient's rhythm strips, echocardiogram suggests the need for 

biventricular pacer with reduced LV function.  Will plan for this in 30 days as 

he recovers from recent transcutaneous aortic valve replacement.


This was discussed with the patient his wife.


Agree with discharge back to rehabilitation with clinical follow-up within 1 

week.

















11/27/17


Impression:  Stable cardiovascular status post replacement of the aortic valve.

  Persistent cardiomyopathy on good medical therapy.  He appears compensated 

without evidence of congestive heart failure.  He remains weak.  Today 

neurologically he appears to be alert oriented and appropriate.


Current medical therapy.


Considerations for upgrade to biventricular pacing if ejection fraction not 

improving.



































Subjective: 


Patient denies chest pain, shortness of breath.  He is up in a chair.  He has 

been walking without limitations.





Objective: 





Medications











Generic Name Dose Route Start Last Admin





  Trade Name Freq  PRN Reason Stop Dose Admin


 


Rosuvastatin Calcium  10 mg  11/23/17 09:00  11/28/17 10:11





  Crestor  PO  05/22/18 08:59  10 mg





  [facility]   


 


Warfarin Sodium  1 each  11/23/17 09:00  





  [place]  [facility] [place] 08:[place]  





  [facility]   





  Protocol   


 


Carvedilol  3.125 mg  11/27/17 18:00  11/28/17 10:10





  Coreg  PO  05/26/18 17:59  3.125 mg





  [facility]   


 


Digoxin  125 mcg  11/23/17 10:00  11/28/17 10:20





  Lanoxin  PO  05/22/18 09:59  125 mcg





  [email or web address]   


 


Furosemide  20 mg  11/28/17 09:00  11/28/17 10:10





  Lasix  PO  05/27/18 08:59  20 mg





  DAILY [facility]   


 


Lisinopril  2.5 mg  11/28/17 09:00  11/28/17 10:11





  Zestril  PO  05/27/18 08:59  2.5 mg





  [facility]   











 Vital Signs











Temp Pulse Resp BP Pulse Ox


 


 36.6 C   68   16   104/58 L  98 


 


 11/28/17 08:00  11/28/17 08:00  11/28/17 08:00  11/28/17 08:00  11/28/17 08:00








 Microbiology











 11/26/17 19:06 Gram Stain - Final





 Thoracic Fluid - Aspirate 


 


 11/25/17 14:45  - Final





 Sputum, Expectorated Sputum Culture - Final








 Laboratory Results





 11/27/17 04:35 





 11/28/17 04:41 





 











 11/27/17 11/28/17 11/29/17





 05:59 05:59 05:59


 


Intake Total 1180 1500 


 


Output Total 600 1100 


 


Balance 580 400 








 











PT  24.6 SEC (12.0-15.0)  H D 11/28/17  04:41    


 


INR  2.20  (0.83-1.16)  H  11/28/17  04:41    














Physical Exam





- Physical Exam


General Appearance: alert, no apparent distress


EENT: No scleral icterus (R), No scleral icterus (L), No anisocoria


Neck: non-tender, full range of motion


Respiratory: lungs clear


Cardiac/Chest: regular rate, rhythm, No edema, No JVD


Abdomen: normal bowel sounds, non-tender


Back: Normal inspection


Skin: normal color


Lymphatic: no adenopathy


Extremities: No pedal edema, No calf tenderness


Neuro/Psych: no motor/sensory deficits, No aphasia, No cognition abnormalities, 

No speech abnormalities





ICD10 Worksheet


Patient Problems: 


 Problems











Problem Status Onset


 


Coronary artery bypass grafting Active  


 


Nausea and vomiting Active  


 


Non-healing surgical wound Active  


 


Hematuria syndrome Active  


 


Gastrointestinal bleeding, lower Acute  


 


Chronic Disease Management/Transitional Care Program Acute  


 


Acute combined systolic and diastolic CHF, NYHA class 3 Acute  


 


Coronary arteriosclerosis Acute  


 


S/P CABG x 3 Acute  


 


Chest pain Acute  


 


Elevated troponin Acute  


 


Elevated bilirubin Acute  


 


Subtherapeutic international normalized ratio (INR) Acute  


 


Altered mental status Acute  














Review of Systems





- Review of Systems


Constitutional: no symptoms reported, weakness


EENTM: no symptoms reported


Respiratory: no symptoms reported


Cardiac: no symptoms reported


Gastrointestinal/Abdominal: no symptoms reported


Genitourinary: no symptoms


Musculoskelatal: no symptoms


Skin: no symptoms


Neurological: no symptoms


Hematologic/Lymphatic: no symptoms reported

## 2017-11-28 NOTE — HOSPPROG
Hospitalist Progress Note


Assessment/Plan: 





82 yo M w recent TAVR admitted w confusion 








Amnesia - ? CVA vs. metabolic encephalopathy vs. transient global amnesia


   -PCM is MRI compatible device - check MRI brain with and without contrast


   MRI w small cerebellar stroke. this doesnt account for sx





   I believe his sx caused by encephalopathy in high functioning patient


      waxing/waning aspect c/w encephalopathy


      more alert today 11/28





? pneumothorax: present on today's cxr (11/28, interp by me)


   suspect he has some scarring and lung tethered to pleura      


   high flow 02 as tolerated





CVA: small


   continue warfarin





s/p TAVR, h/o previous bioprosthetic AVR





Factor V Leiden with recurrent PE/DVT


   -resume lovenox if INR < 2


   inr 3.2 today





Afib/PCM





Acute on chronic systolic CHF - EF 25% - newly reduced


   -lower EF post TAVR probably a changed conduction effect


   -consider start ACEI/beta-blocker if BP tolerates





CAD/CABG/stent





Loculated right pleural effusion


   -progressive since October


   -s/p thoracentesis





chronic respiratory failure 2L





RA





Metastatic prostate cancer


   -enlarging LAD by CT


   -continue Xtandi





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





h/o melanoma s/p resection











Subjective: case d/w Netta Bernstein and Сергей


Objective: 


 Vital Signs











Temp Pulse Resp BP Pulse Ox


 


 36.5 C   72   20   108/61   97 


 


 11/28/17 12:00  11/28/17 12:00  11/28/17 12:00  11/28/17 14:27  11/28/17 12:00








 Microbiology











 11/27/17 15:00 Mycobacterial Smear (FERNANDO) - Final





 Thoracic Fluid - Aspirate 


 


 11/26/17 19:06 Gram Stain - Final





 Thoracic Fluid - Aspirate 


 


 11/25/17 14:45  - Final





 Sputum, Expectorated Sputum Culture - Final








 Laboratory Results





 11/27/17 04:35 





 11/28/17 04:41 





 











 11/27/17 11/28/17 11/29/17





 05:59 05:59 05:59


 


Intake Total 1180 1500 


 


Output Total 600 1100 


 


Balance 580 400 








 











PT  24.6 SEC (12.0-15.0)  H D 11/28/17  04:41    


 


INR  2.20  (0.83-1.16)  H  11/28/17  04:41    














- Physical Exam


Constitutional: no apparent distress, appears nourished


Eyes: PERRL, anicteric sclera


Ears, Nose, Mouth, Throat: moist mucous membranes, hearing normal


Cardiovascular: regular rate and rhythym, systolic murmur, No no murmur, rub, 

or gallop


Respiratory: no respiratory distress, no rales or rhonchi


Gastrointestinal: normoactive bowel sounds, soft, non-tender abdomen


Genitourinary: no bladder fullness, No loya in urethra


Skin: warm


Musculoskeletal: full muscle strength





ICD10 Worksheet


Patient Problems: 


 Problems











Problem Status Onset


 


Altered mental status Acute  


 


Subtherapeutic international normalized ratio (INR) Acute  


 


Coronary artery bypass grafting Active  


 


Hematuria syndrome Active  


 


Nausea and vomiting Active  


 


Non-healing surgical wound Active  


 


Acute combined systolic and diastolic CHF, NYHA class 3 Acute  


 


Chest pain Acute  


 


Chronic Disease Management/Transitional Care Program Acute  


 


Coronary arteriosclerosis Acute  


 


Elevated bilirubin Acute  


 


Elevated troponin Acute  


 


Gastrointestinal bleeding, lower Acute  


 


S/P CABG x 3 Acute

## 2017-11-28 NOTE — NEUROPROG
Assessment: 


15 minutes total unit time. Pt has recovered back to neurologic baseline. There 

is a small stroke with NIHSS of zero. The ischemia in the left cerebellar 

hemisphere is not likely causing any symptoms. Once cleared medically, he can 

return to rehab. Maintain therapeutic INR. 


Subjective: 


Pt has no complaints this morning and wants to leave


Objective: 





 Vital Signs











Temp Pulse Resp BP Pulse Ox


 


 36.8 C   72   16   104/60   94 


 


 11/28/17 04:50  11/28/17 04:50  11/28/17 04:50  11/28/17 04:50  11/28/17 04:50








 Microbiology











 11/26/17 19:06 Gram Stain - Final





 Thoracic Fluid - Aspirate 


 


 11/25/17 14:45  - Final





 Sputum, Expectorated Sputum Culture - Final








 Laboratory Results





 11/27/17 04:35 





 11/28/17 04:41 





 











 11/27/17 11/28/17 11/29/17





 05:59 05:59 05:59


 


Intake Total 1180 1500 


 


Output Total 600 1100 


 


Balance 580 400 








 











PT  24.6 SEC (12.0-15.0)  H D 11/28/17  04:41    


 


INR  2.20  (0.83-1.16)  H  11/28/17  04:41    








Alert and oriented and seems stable cognitively.


Allergies/Adverse Reactions: 


 





daptomycin Allergy (Verified 10/02/17 21:01)

## 2017-11-29 NOTE — PDINTPN
Intensivist Progress Note


Assessment/Plan: 


Assessment/plan:








83 M with complex medical history that includes remote pulmonary sarcoma of 

left lung requiring lobectomy and XRT, who also has CHF with an EF 0f 25-30% 

associated with AS. He underwent TAVR 11/13/17 and was dc'd to rehab, but re-

admitted 11/23/17 with altered mental status. His work-up was largely negative 

and he has improved. Part of the work-up involved a CXR/CT showing an increase 

in a chronic right sided effusion that has been present in various forms since 

at least 2012. A CT on admission showed a slight increase in the fluid compared 

to 10/2017 so a thoracentesis was performed that ruled out empyema, but did not 

result in re-expansion of the lung. 





* Hydropneumothorax- confirmed on CT, this appears to be well-loculated and 

will eventually fill with fluid once again. There is no evidence of empyema 

with a high pH and negative gram stain and cultures. Cytology remains pending, 

but my suspicion for recurrent sarcoma is very low. No chest tube indicated. 


* Altered MS- returning back to baseline

















Subjective: 





feels well, remains forgetful which is still abnormal per daughter


Objective: 





 Vital Signs











Temp Pulse Resp BP Pulse Ox


 


 36.4 C   70   15   109/62   99 


 


 11/29/17 11:53  11/29/17 11:53  11/29/17 11:53  11/29/17 11:53  11/29/17 11:53








 Microbiology











 11/27/17 15:00 Mycobacterial Smear (FERNANDO) - Final





 Thoracic Fluid - Aspirate 


 


 11/26/17 19:06 Gram Stain - Final





 Thoracic Fluid - Aspirate 








 Laboratory Results





 11/27/17 04:35 





 11/29/17 04:31 





 











 11/28/17 11/29/17 11/30/17





 05:59 05:59 05:59


 


Intake Total 1500 900 


 


Output Total 1100 200 350


 


Balance 400 700 -350








 











PT  24.8 SEC (12.0-15.0)  H  11/29/17  04:31    


 


INR  2.22  (0.83-1.16)  H  11/29/17  04:31    














Physical Exam





- Physical Exam


General Appearance: WD/WN, alert, no apparent distress


EENT: PERRL/EOMI


Neck: supple


Respiratory: lungs clear, normal breath sounds, No respiratory distress


Cardiac/Chest: regular rate, rhythm, No edema


Abdomen: normal bowel sounds, non-tender, soft, No distended


Skin: normal color, warm/dry


Lymphatic: no adenopathy


Extremities: No pedal edema


Neuro/Psych: alert, normal mood/affect, oriented x 3





ICD10 Worksheet


Patient Problems: 


 Problems











Problem Status Onset


 


Altered mental status Acute  


 


Subtherapeutic international normalized ratio (INR) Acute  


 


Coronary artery bypass grafting Active  


 


Hematuria syndrome Active  


 


Nausea and vomiting Active  


 


Non-healing surgical wound Active  


 


Acute combined systolic and diastolic CHF, NYHA class 3 Acute  


 


Chest pain Acute  


 


Chronic Disease Management/Transitional Care Program Acute  


 


Coronary arteriosclerosis Acute  


 


Elevated bilirubin Acute  


 


Elevated troponin Acute  


 


Gastrointestinal bleeding, lower Acute  


 


S/P CABG x 3 Acute

## 2017-11-29 NOTE — HOSPPROG
Hospitalist Progress Note


Assessment/Plan: 





84 yo M w recent TAVR admitted w confusion 





weakness: bp in 80's


   gentle IVF and hold lasix


   check cdiff given diarrhea





DIARRHEA: SOFT ABDOMEN , NRMAL EXAM


   AS ABOVE











Amnesia - ? CVA vs. metabolic encephalopathy vs. transient global amnesia


   -PCM is MRI compatible device - check MRI brain with and without contrast


   MRI w small cerebellar stroke. this doesnt account for sx





   I believe his sx caused by encephalopathy in high functioning patient


      waxing/waning aspect c/w encephalopathy


      more alert today 11/28





? pneumothorax: present on today's cxr (11/28, interp by me)


   suspect he has some scarring and lung tethered to pleura      


   high flow 02 as tolerated





   this is trapped lung


   no further workup





CVA: small


   continue warfarin





s/p TAVR, h/o previous bioprosthetic AVR





Factor V Leiden with recurrent PE/DVT


   -resume lovenox if INR < 2


   inr 3.2 today





Afib/PCM





Acute on chronic systolic CHF - EF 25% - newly reduced


   -lower EF post TAVR probably a changed conduction effect


   -consider start ACEI/beta-blocker if BP tolerates





CAD/CABG/stent





Loculated right pleural effusion


   -progressive since October


   -s/p thoracentesis





chronic respiratory failure 2L





RA





Metastatic prostate cancer


   -enlarging LAD by CT


   -continue Xtandi





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





h/o melanoma s/p resection











Subjective: case d/w dr jacqeus.  CT images reviewed/intep by me- c/w trapped lung


Objective: 


 Vital Signs











Temp Pulse Resp BP Pulse Ox


 


 36.8 C   69   15   83/50 L  95 


 


 11/29/17 15:22  11/29/17 15:22  11/29/17 15:22  11/29/17 15:22  11/29/17 15:22








 Microbiology











 11/26/17 19:06 Gram Stain - Final





 Thoracic Fluid - Aspirate 


 


 11/27/17 15:00 Mycobacterial Smear (FERNANDO) - Final





 Thoracic Fluid - Aspirate 








 Laboratory Results





 11/27/17 04:35 





 11/29/17 04:31 





 











 11/28/17 11/29/17 11/30/17





 05:59 05:59 05:59


 


Intake Total 1500 900 


 


Output Total 1100 200 350


 


Balance 400 700 -350








 











PT  24.8 SEC (12.0-15.0)  H  11/29/17  04:31    


 


INR  2.22  (0.83-1.16)  H  11/29/17  04:31    














- Physical Exam


Constitutional: no apparent distress, appears nourished


Eyes: PERRL, anicteric sclera


Ears, Nose, Mouth, Throat: moist mucous membranes, hearing normal


Cardiovascular: regular rate and rhythym, no murmur, rub, or gallop, systolic 

murmur


Respiratory: no respiratory distress, no rales or rhonchi


Gastrointestinal: normoactive bowel sounds, soft, non-tender abdomen, No 

guarding, No rebound, No distension


Genitourinary: no bladder fullness, No loya in urethra


Skin: warm, normal color


Musculoskeletal: full muscle strength


Neurologic: AAOx3


Psychiatric: interacting appropriately





ICD10 Worksheet


Patient Problems: 


 Problems











Problem Status Onset


 


Altered mental status Acute  


 


Subtherapeutic international normalized ratio (INR) Acute  


 


Coronary artery bypass grafting Active  


 


Hematuria syndrome Active  


 


Nausea and vomiting Active  


 


Non-healing surgical wound Active  


 


Acute combined systolic and diastolic CHF, NYHA class 3 Acute  


 


Chest pain Acute  


 


Chronic Disease Management/Transitional Care Program Acute  


 


Coronary arteriosclerosis Acute  


 


Elevated bilirubin Acute  


 


Elevated troponin Acute  


 


Gastrointestinal bleeding, lower Acute  


 


S/P CABG x 3 Acute

## 2017-11-29 NOTE — GCON
[f rep st]



                                                                    CONSULTATION





PULMONARY CONSULTATION



DATE OF CONSULTATION:  11/28/2017



HISTORY OF PRESENT ILLNESS:  This patient is an 83-year-old male with an extensive medical history, p
rimarily involving cardiac disease with poor ejection fraction.  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  He was admitted on 11/22 with mental status changes and
 has undergone extensive workup that has been largely negative.  He underwent a TAVR on 11/13, which 
was apparently without complications, and seemed to be doing fairly well.  His workup, however, did i
nclude a chest x-ray on 11/25 that showed a possible increase in a chronic pleural effusion.  A CT sc
an did confirm that when compared to October of 2017.  He has apparently had a chronic effusion since
 at least 2012 and possibly before then.  The effusions are not thought to be related to his remote h
istory of sarcoma, and he has been tapped in the past, but those details are uncertain to both the me
dical record, as far as I can tell, as well to the patient.  In any case, his case was discussed with
 Dr. Llanes on the 26th, who known him from outpatient management many years ago, who suggested a thor
acentesis, which was indeed performed.  That fluid did reveal cloudy red fluid, but the pH was 7.5 wi
th 14,000 red cells and primarily lymphocytes on 124 white cells.  The glucose and LDH, as well as pr
otein were all pretty close to normal, and the cytology is still pending at this time.  In any case, 
after the thoracentesis, he said that his breathing did not improve, though he did not experience any
 pain.  A postprocedure chest x-ray suggested a pneumothorax, and a follow-up chest x-ray today shows
 a hydropneumothorax.  The patient is otherwise stable and without complaints of chest pain or dyspne
a.



REVIEW OF SYSTEMS:  Otherwise negative.



PAST MEDICAL HISTORY:  

1.  Congestive heart failure with an ejection fraction of 25% to 30%, as well as diastolic chronic he
art failure.

2.  Aortic stenosis, status post TAVR. 

3.  Aortic regurgitation as seen on his most recent echocardiogram.

4.  Chronic hypoxemia of 2 L/minute.

5.  Atrial fibrillation.

6.  Permanent pacer.

7.  Coronary artery disease.

8.  Remote deep vein thrombosis and pulmonary embolism.

9.  Factor V Leiden deficiency.

10.  Hypothyroidism.

11.  Anxiety.

12.  Hypertension.

13.  Gastroesophageal reflux disease.

14.  Hyperlipidemia.

15.  Chronic kidney disease, stage 3.

16.  Metastatic prostate cancer, which I believe is an active issue at this moment.

17.  Degenerative joint disease.

18.  Rheumatoid arthritis.

19.  Vertigo.

20.  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

21.  Remote cardiac arrest.

22.  Chronic effusion, as described above next.



PAST SURGICAL HISTORY:  

1.  Coronary artery bypass graft.

2.  TAVR. 

3.  Total hip arthroplasty.

4.  Hernia repair.

5.  Toe surgery.

6.  Permanent pacer placement.

7.  Left lower lobe lobectomy in 1998.



SOCIAL HISTORY:  He is a nonsmoker.  No alcohol or IV drug use.



FAMILY HISTORY:  Noncontributory at this time.



MEDICATIONS:  Currently include Tylenol, vitamin C, Coreg, Lanoxin, Lasix, Mucinex, Synthroid, Zestri
l, Zofran, Protonix, Crestor, Flomax, Restoril, warfarin.



PHYSICAL EXAMINATION:  VITAL SIGNS:  He has been afebrile throughout his admission.  His blood pressu
re 123/80, heart rate 63, respiratory rate 14, oxygen saturation 93% on 2 L.  GENERAL:  He was awake 
and alert oriented x3, though he did think his tap was today not yesterday.  Otherwise, in no distres
s and spoke in full sentences without using accessory muscles for breathing.  HEENT:  Pupils equally 
round and reactive to light, nonicteric, and noninjected.  Mucous membranes are moist without erythem
a or exudate.  NECK:  Supple without adenopathy or jugular vein distention.  LUNGS:  Breath sounds we
re remarkably clear to auscultation, pretty equal without wheezes or rhonchi. HEART:  Appeared to hav
e regular rate and rhythm.  ABDOMEN:  Soft, nontender, nondistended without hepatosplenomegaly and no
rmoactive bowel sounds.  EXTREMITIES:  Showed no clubbing, cyanosis, or edema.  NEUROLOGIC:  Nonfocal
, including cranial nerves, deep tendon reflexes.  SKIN:  Warm and dry without evidence of rash.



OBJECTIVE DATA:  A chest x-rays as I described above.  There is no CBC today, but yesterday's white c
ount was 2.8, hematocrit 33, platelets of 125.  INR was 3.2 at 0435 on the 27th prior to the tap.  He
 did get FFP, and his INR was 2.2 at 0440 today.  Basic metabolic panel was unremarkable.  Pleural fl
uid was as described.



ASSESSMENT AND PLAN:  Probable hydropneumothorax.  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  VICK crump has had a chronic effusion apparently since at least 2012 with a slight increase, probably related 
to his underlying heart failure after his recent surgery.  My suspicion for recurrent malignancy some
 20 years later is particularly low.  I think the bloody fluid could easily be just attributed to eit
her his INR or mildly reduced platelets, though the cytology is pending at this time.  I do not feel 
a chest tube is required at this time.  He is really quite stable.  The high-flow oxygen is reasonabl
e, and I think that evaluation with a chest CT scan, noncontrast, tomorrow morning would help better 
evaluate this clinical picture at this time.  My suspicion for other disorders like empyema are very,
 very low, particularly with a normal glucose and if anything high pH, so that further observation is
 warranted at this time.





Job #:  [id number]

## 2017-11-29 NOTE — ASMTCMCOM
CM Note

 

CM Note                       

Notes:

11/29/2017 Case Management Note



Met w/Ruthann from [facility].  Planning for tentative d/c tomorrow.  Faxed updates.



Case Management d/c poc:  To [facility] when medically stable.



Case Management to follow.

 

Date Signed:  11/29/2017 03:21 PM

Electronically Signed By:Juana Beauchamp RN

## 2017-11-30 NOTE — PDINTPN
Intensivist Progress Note


Assessment/Plan: 


Assessment/plan:








83 M with complex medical history that includes remote pulmonary sarcoma of 

left lung requiring lobectomy and XRT, who also has CHF with an EF 0f 25-30% 

associated with AS. He underwent TAVR 11/13/17 and was dc'd to rehab, but re-

admitted 11/23/17 with altered mental status. His work-up was largely negative 

and he has improved. Part of the work-up involved a CXR/CT showing an increase 

in a chronic right sided effusion that has been present in various forms since 

at least 2012. A CT on admission showed a slight increase in the fluid compared 

to 10/2017 so a thoracentesis was performed that ruled out empyema, but did not 

result in re-expansion of the lung. 





* Hydropneumothorax- confirmed on CT, this appears to be well-loculated and 

will eventually fill with fluid once again. There is no evidence of empyema 

with a high pH and negative gram stain and cultures. Cytology remains pending, 

but my suspicion for recurrent sarcoma is very low. No chest tube indicated. He 

remains stable today, so I will sign off. Call prn


* Altered MS- returning back to baseline, but forgetful. He has not remembered 

meeting me despite several visits and lengthy discussions

















11/30/17 15:18





Subjective: 





No complaints, but continues to demonstrate amnestic periods


Objective: 





 Vital Signs











Temp Pulse Resp BP Pulse Ox


 


 36.3 C   69   16   90/59 L  97 


 


 11/30/17 11:49  11/30/17 14:04  11/30/17 11:49  11/30/17 14:04  11/30/17 11:49








 Microbiology











 11/26/17 19:06 Gram Stain - Final





 Thoracic Fluid - Aspirate Body Fluid Culture - Final








 Laboratory Results





 11/27/17 04:35 





 11/30/17 04:00 





 











 11/29/17 11/30/17 12/01/17





 05:59 05:59 05:59


 


Intake Total 900 800 450


 


Output Total 200 575 125


 


Balance 700 225 325








 











PT  21.5 SEC (12.0-15.0)  H  11/30/17  04:00    


 


INR  1.86  (0.83-1.16)  H  11/30/17  04:00    














Physical Exam





- Physical Exam


General Appearance: alert, no apparent distress


EENT: PERRL/EOMI


Neck: supple


Respiratory: lungs clear, normal breath sounds, decreased breath sounds, No 

respiratory distress


Cardiac/Chest: regular rate, rhythm, No edema


Abdomen: normal bowel sounds, non-tender, soft, No distended


Skin: normal color, warm/dry


Lymphatic: no adenopathy


Extremities: No pedal edema


Neuro/Psych: alert, normal mood/affect, cognition abnormalities





ICD10 Worksheet


Patient Problems: 


 Problems











Problem Status Onset


 


Altered mental status Acute  


 


Subtherapeutic international normalized ratio (INR) Acute  


 


Coronary artery bypass grafting Active  


 


Hematuria syndrome Active  


 


Nausea and vomiting Active  


 


Non-healing surgical wound Active  


 


Acute combined systolic and diastolic CHF, NYHA class 3 Acute  


 


Chest pain Acute  


 


Chronic Disease Management/Transitional Care Program Acute  


 


Coronary arteriosclerosis Acute  


 


Elevated bilirubin Acute  


 


Elevated troponin Acute  


 


Gastrointestinal bleeding, lower Acute  


 


S/P CABG x 3 Acute

## 2017-11-30 NOTE — HOSPPROG
Hospitalist Progress Note


Assessment/Plan: 





82 yo M w recent TAVR admitted w confusion 





weakness: improved


   cdiff neg





DIARRHEA: SOFT ABDOMEN , NRMAL EXAM


   AS ABOVE











Amnesia - ? CVA vs. metabolic encephalopathy vs. transient global amnesia


   -PCM is MRI compatible device - check MRI brain with and without contrast


   MRI w small cerebellar stroke. this doesnt account for sx





   I believe his sx caused by encephalopathy in high functioning patient


      waxing/waning aspect c/w encephalopathy


      more alert today 11/28





? pneumothorax: present on today's cxr (11/28, interp by me)


   suspect he has some scarring and lung tethered to pleura      


   high flow 02 as tolerated





   this is trapped lung


   no further workup


   cxr AM 11/30





CVA: small


   continue warfarin





s/p TAVR, h/o previous bioprosthetic AVR





Factor V Leiden with recurrent PE/DVT


   -resume lovenox if INR < 2


   inr 3.2 today





Afib/PCM





Acute on chronic systolic CHF - EF 25% - newly reduced


   -lower EF post TAVR probably a changed conduction effect


   -consider start ACEI/beta-blocker if BP tolerates





CAD/CABG/stent





Loculated right pleural effusion


   -progressive since October


   -s/p thoracentesis





chronic respiratory failure 2L





RA





Metastatic prostate cancer


   -enlarging LAD by CT


   -continue Xtandi





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





h/o melanoma s/p resection











Subjective: case d/w dr huitron.  less somnolent today.  walking w nursing, pt


Objective: 


 Vital Signs











Temp Pulse Resp BP Pulse Ox


 


 36.3 C   69   16   90/59 L  97 


 


 11/30/17 11:49  11/30/17 14:04  11/30/17 11:49  11/30/17 14:04  11/30/17 11:49








 Microbiology











 11/26/17 19:06 Gram Stain - Final





 Thoracic Fluid - Aspirate Body Fluid Culture - Final








 Laboratory Results





 11/27/17 04:35 





 11/30/17 04:00 





 











 11/29/17 11/30/17 12/01/17





 05:59 05:59 05:59


 


Intake Total 900 800 450


 


Output Total 200 575 125


 


Balance 700 225 325








 











PT  21.5 SEC (12.0-15.0)  H  11/30/17  04:00    


 


INR  1.86  (0.83-1.16)  H  11/30/17  04:00    














- Physical Exam


Constitutional: no apparent distress, appears nourished


Eyes: PERRL, anicteric sclera


Ears, Nose, Mouth, Throat: moist mucous membranes, hearing normal


Cardiovascular: regular rate and rhythym, no murmur, rub, or gallop


Respiratory: no respiratory distress, no rales or rhonchi


Gastrointestinal: normoactive bowel sounds, soft, non-tender abdomen


Genitourinary: no bladder fullness, no bladder tenderness


Skin: warm, normal color





ICD10 Worksheet


Patient Problems: 


 Problems











Problem Status Onset


 


Altered mental status Acute  


 


Subtherapeutic international normalized ratio (INR) Acute  


 


Coronary artery bypass grafting Active  


 


Hematuria syndrome Active  


 


Nausea and vomiting Active  


 


Non-healing surgical wound Active  


 


Acute combined systolic and diastolic CHF, NYHA class 3 Acute  


 


Chest pain Acute  


 


Chronic Disease Management/Transitional Care Program Acute  


 


Coronary arteriosclerosis Acute  


 


Elevated bilirubin Acute  


 


Elevated troponin Acute  


 


Gastrointestinal bleeding, lower Acute  


 


S/P CABG x 3 Acute

## 2017-12-01 NOTE — PDIAF
- Diagnosis


Diagnosis: encephalopathy


Code Status: Full Code





- Medication Management


Discharge Medications: 


 Medications to Continue on Transfer





Tamsulosin HCl [Flomax 0.4 MG (*)] 0.4 mg PO DAILY 03/13/12 [Last Taken 11/22/17

]


Levothyroxine [Synthroid 150 mcg (*)] 150 mcg PO DAILY06 12/30/13 [Last Taken 11 /22/17]


Nitroglycerin [Nitrostat 0.4 mg (*)] 0.4 mg SL Q5M PRN 01/24/17 [Last Taken 03/ 24/17]


Enzalutamide [Xtandi] 80 mg PO DAILY 10/26/17 [Last Taken 11/22/17]


Potassium Chloride Po [Potassium Chloride 20 mg/15 ml (*)] 20 meq PO BID  udcup 

10/31/17 [Last Taken 11/22/17]


Ascorbic Acid [Vitamin C 500 mg (*)] 1,000 mg PO DAILY 11/03/17 [Last Taken 11/ 22/17]


Cholecalciferol Vit D3 [Vitamin D3 (*)] 1,000 units PO DAILY 11/03/17 [Last 

Taken 11/22/17]


Rosuvastatin Calcium [Crestor] 10 mg PO DAILY 11/03/17 [Last Taken 11/22/17]


Digoxin [Lanoxin 250 mcg (RX)] 125 mcg PO DAILY10  tab 11/15/17 [Last Taken 11/ 22/17]


Pantoprazole Sodium [Protonix 40mg (*)] 40 mg PO DAILY 11/22/17 [Last Taken 11/ 22/17]


Warfarin Sodium [Coumadin] 10 mg PO DAILY@16 11/22/17 [Last Taken 11/22/17]


Carvedilol [Coreg (*)] 3.125 mg PO BIDMEAL  tab 12/01/17 [Last Taken Unknown]


Furosemide [Lasix 20 MG (*)] 20 mg PO DAILY  tab 12/01/17 [Last Taken Unknown]


Lisinopril [Zestril 2.5 mg (*)] 2.5 mg PO DAILY  tab 12/01/17 [Last Taken 

Unknown]








Discharge Medications: Refer to the Discharge Home Medication list for PRN 

reason.





- Orders


Services needed: Registered Nurse, Certified Nursing Aide, Physical Therapy, 

Occupational Therapy


Diet Recommendation: no restrictions on diet


Diet Texture: Regular Texture Diet, Thin Liquids, Meds Whole w/Liquids





- Labs/Radiology


PT/INR Date: 12/04/17 (mondays and thursdays. home dose is 10, which I have 

continued. may need lower dose)





- Follow Up Care


Current Providers and Referrals: 


Patient,NotPresent [Unknown] - As per Instructions


Brad Seay MD [Medical Doctor] -  (2-3 weeks)

## 2017-12-01 NOTE — ASDISCHSUM
----------------------------------------------

Discharge Information

----------------------------------------------

Plan Status:SNF                                      Medically Cleared to Leave:11/30/2017

Discharge Date:12/01/2017 03:16 PM                    D/C Disposition:Skilled Nursing Facility

ADT D/C Disposition:Skilled Nursing Facility         Projected Discharge Date:12/01/2017 11:00 AM

Transportation at D/C:                               Discharge Delay Reason:

Follow-Up Date:12/01/2017 11:00 AM                   Discharge Slot:

Final Diagnosis:

----------------------------------------------

Placement Information

----------------------------------------------

Referral Type:*Nursing Home/SNF                      Referral ID:[id number]

Provider Name:[facility]

Address 1:[address] [place]                    Phone [phone]

Address 2:                                           Fax [phone]

City:[place]                                      Selection Factors:

State:[place]

 

----------------------------------------------

Patient Contact Information

----------------------------------------------

Contact Name:SHERRIE                           Relationship:Wife

Address:[address]                            Home [phone]

                                                     Work [phone]

City:[place]                                         Alternate Phone:

State/Zip Code:[place] [address]                              Email:

----------------------------------------------

Financial Information

----------------------------------------------

Financial Class:

Primary Plan Desc:MEDICARE INPATIENT                 Primary Plan Number:[id number]

Secondary Plan Desc:[employer]/MDR SUPPLEMENT              Secondary Plan Number:[id number]

 

 

----------------------------------------------

Assessment Information

----------------------------------------------

----------------------------------------------

[facility] CM Progress Note

----------------------------------------------

CM Note

 

CM Note                       

Notes:

Reviewed chart, spoke w/ ESTHELA Bee regarding discharge plan, pt's progress. Per MD notes, pt 

admitted w/ transient confusion, metabolic encephalopathy vs TIA w/ a subtherapeutic INR. Pt is s/p 


TAVR this admission, w/ an extensive cardiac hx. Admitted from [facility]. Met w/ pt and pt's 

wife to discuss discharge plan. Wife reports being very unhappy with the fact her  had a 

subtherapeutic INR resulting in a TIA  while at [facility]. CM listened to pt and family's 

concerns; support provided. Offered wife alternate list of SNF facilities, wife declined, stating 

she would like for her  to return to [facility] upon d/c. PT/OT cont to rec SNF. CM 

will cont to follow.







Current Discharge Plan: [facility]

 

Date Signed:  11/23/2017 03:31 PM

Electronically Signed By:Awilda Peterson RN

 

 

----------------------------------------------

[facility] CM Progress Note

----------------------------------------------

CM Note

 

CM Note                       

Notes:

11/24/2017 Case Management Note



Reviewed chart, spoke w/MD.



Faxed referral to [facility].  Called Florencia, admission coordinator to discuss case.  Per 

Florencia, pt has had several stays at the facility and is well known to staff.  [facility] accepted 

pt and expect his return when he is medically stable.  Florencia plans to have staff meet with wife 

to address her concerns when pt returns to facility.



Case Management d/c poc:  Return to [facility] when medically stable.



Case Management available if needs change.

 

Date Signed:  11/24/2017 12:24 PM

Electronically Signed By:Juana Beauchamp RN

 

 

----------------------------------------------

[facility] CM Progress Note

----------------------------------------------

CM Note

 

CM Note                       

Notes:

11/27/2017 Case Management Note



Faxed updates to [facility].  PT continues to recommend SNF rehab.



Case Management d/c poc:  return to [facility] when medically stable.



Case Management to follow.

 

Date Signed:  11/27/2017 09:45 AM

Electronically Signed By:Juana Beauchamp RN

 

 

----------------------------------------------

[facility] CM Progress Note

----------------------------------------------

CM Note

 

CM Note                       

Notes:

11/29/2017 Case Management Note



Met w/Ruthann from [facility].  Planning for tentative d/c tomorrow.  Faxed updates.



Case Management d/c poc:  To [facility] when medically stable.



Case Management to follow.

 

Date Signed:  11/29/2017 03:21 PM

Electronically Signed By:Juana Beauchamp RN

 

 

----------------------------------------------

Case Management Discharge Plan Note

----------------------------------------------

Case Management Discharge

 

Discharge Order Complete?     Answers:  Yes                                   

Patient to Obtain             Answers:  Other                         Notes:  [facility]

Medications                                                                   

Transportation Arranged       Answers:  Other                         Notes:  [facility] transport

Transport will Pick (Date     12/01/2017 03:00 PM

& Time)                       

[facility]AL[facility] Complete               Answers:  No                                    

Case Management Transport     Answers:  Yes                                   

Form Complete                                                                 

Family Notified               Answers:  Yes                                   

Discharge Comments            

Notes:

Pt is being discharged today back to [facility]. Inga from [facility] is arranging 

transportation. CM provided ESTHELA Leonard w/ phone number to give report. CM faxed over d/c 

orders. CM available for changes.



Plan: [facility] 

 

Date Signed:  12/01/2017 12:20 PM

Electronically Signed By:DEION Rey

 

 

----------------------------------------------

Intervention Information

----------------------------------------------

Intervention Type:*IM-Signed                         Date of Service:12/01/2017 11:50 AM

Patient Type:Inpatient                               Staff Member:Liliam Castanon

Hours:                                               Discipline:

Severity:                                            Comment:

## 2017-12-01 NOTE — HOSPPROG
Hospitalist Progress Note


Assessment/Plan: 





82 yo M w recent TAVR admitted w confusion 





weakness: improved


   cdiff neg





DIARRHEA: SOFT ABDOMEN , NRMAL EXAM


   AS ABOVE











Amnesia - ? CVA vs. metabolic encephalopathy vs. transient global amnesia


   -PCM is MRI compatible device - check MRI brain with and without contrast


   MRI w small cerebellar stroke. this doesnt account for sx





   I believe his sx caused by encephalopathy in high functioning patient


      waxing/waning aspect c/w encephalopathy


      more alert today 11/28





? pneumothorax: resolved





CVA: small


   continue warfarin





s/p TAVR, h/o previous bioprosthetic AVR





Factor V Leiden with recurrent PE/DVT


   -resume lovenox if INR < 2


   inr 3.2 today





Afib/PCM





Acute on chronic systolic CHF - EF 25% - newly reduced


   -lower EF post TAVR probably a changed conduction effect


   -consider start ACEI/beta-blocker if BP tolerates





CAD/CABG/stent





Loculated right pleural effusion


   -progressive since October


   -s/p thoracentesis





chronic respiratory failure 2L





RA





Metastatic prostate cancer


   -enlarging LAD by CT


   -continue Xtandi





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





h/o melanoma s/p resection





dc to SNF today


> 30 minutes











Subjective: more alert.  ready for SNF.  cxr w resolved hydropneumothorax- 

nowrecurrent pleural effusion


Objective: 


 Vital Signs











Temp Pulse Resp BP Pulse Ox


 


 36.4 C   70   12   92/52 L  97 


 


 12/01/17 07:38  12/01/17 11:09  12/01/17 07:38  12/01/17 07:38  12/01/17 07:38








 Microbiology











 11/26/17 19:06 Gram Stain - Final





 Thoracic Fluid - Aspirate Body Fluid Culture - Final








 Laboratory Results





 11/27/17 04:35 





 11/30/17 04:00 





 











 11/30/17 12/01/17 12/02/17





 05:59 05:59 05:59


 


Intake Total 800 1250 


 


Output Total 575 450 100


 


Balance 225 800 -100








 











PT  24.7 SEC (12.0-15.0)  H  12/01/17  03:52    


 


INR  2.23  (0.83-1.16)  H  12/01/17  03:52    














- Physical Exam


Constitutional: no apparent distress, appears nourished


Eyes: PERRL, anicteric sclera


Ears, Nose, Mouth, Throat: moist mucous membranes, hearing normal


Cardiovascular: regular rate and rhythym, systolic murmur, No no murmur, rub, 

or gallop


Respiratory: no respiratory distress, no rales or rhonchi


Gastrointestinal: normoactive bowel sounds, soft, non-tender abdomen


Genitourinary: no bladder fullness, No loya in urethra


Skin: warm, normal color


Musculoskeletal: full muscle strength


Neurologic: AAOx3





ICD10 Worksheet


Patient Problems: 


 Problems











Problem Status Onset


 


Altered mental status Acute  


 


Subtherapeutic international normalized ratio (INR) Acute  


 


Coronary artery bypass grafting Active  


 


Hematuria syndrome Active  


 


Nausea and vomiting Active  


 


Non-healing surgical wound Active  


 


Acute combined systolic and diastolic CHF, NYHA class 3 Acute  


 


Chest pain Acute  


 


Chronic Disease Management/Transitional Care Program Acute  


 


Coronary arteriosclerosis Acute  


 


Elevated bilirubin Acute  


 


Elevated troponin Acute  


 


Gastrointestinal bleeding, lower Acute  


 


S/P CABG x 3 Acute

## 2018-02-12 NOTE — CPEKG
Heart Rate: 70

RR Interval: 857

P-R Interval: 236

QRSD Interval: 136

QT Interval: 432

QTC Interval: 467

P Axis: 27

QRS Axis: -69

T Wave Axis: 42

EKG Severity - ABNORMAL ECG -

EKG Impression: V-paced with most likely underlying AF

Electronically Signed By: Markus Haney 12-Feb-2018 12:36:32

## 2018-02-12 NOTE — CPEKG
Heart Rate: 65

RR Interval: 923

P-R Interval: 146

QRSD Interval: 130

QT Interval: 472

QTC Interval: 491

P Axis: 0

QRS Axis: 244

T Wave Axis: -12

EKG Severity - ABNORMAL ECG -

EKG Impression: A-V DUAL-PACED RHYTHM

Electronically Signed By: Jake Vasquez 12-Feb-2018 14:45:22

## 2018-02-12 NOTE — PDGENHP
History & Physical


Chief Complaint: dyspnea


History of Present Illness: dyspnea, fatigue


Pertinent Past, Social, Family History: ischemic cmp, cabg.  tavr


Relevant Physical Exam: s1s2 rrr sm.  cta.  ao3


Cardiorespiratory Assessment: permanent afib.  chb, sp pacemaker.  to place LV 

lead to promote ventricular synchrony in patient with chf

## 2018-02-13 NOTE — GDS
[f 
rep st]



                                                             DISCHARGE SUMMARY





DISCHARGE DIAGNOSIS:  

1.  Dual chamber pacemaker upgrade to Bi ventricular pacemaker

2. CAD

3.CHF

4. Cardiomyopathy

5. s/p TAVR

6. PAF 



BRIEF HISTORY:  This is an 83-year-old man with a history of CABG and long-
standing PAF who is status post recent TAVR.  He had mild improvement of 
symptoms after his TAVR, however, continues to experience fatigue and exercise 
intolerance.  He is pacemaker dependent with 100% RV pacing and ejection 
fraction of less than 35%.



HOSPITAL COURSE:  Dr. Vasquez upgraded his dual-chamber pacemaker to biventricular 
pacemaker.  This is a [email or web address] 8 HF-T and a new LV lead that is a [employer].  The patient has done well overnight.  He denies any 
significant discomfort at the pacemaker site.  He denies any chest pain, 
pressure, tightness, or shortness of breath.  The pacemaker is programmed DDD 
with CLS, base rate of 60 beats per minute.  On the morning of discharge, 
pacemaker interrogation demonstrated P waves of 2.2 mV, RA threshold of 1.1 V 
at 0.4 msec, RA lead impedance is 487 ohms; RV R-wave sensing is 7.7 mV, 
threshold is 0.8 V at 0.4 msec, lead impedance is 624 ohms; LV R-wave sensing 
is 9.6 mV, threshold is 0.8 V at 0.4 msec, lead impedance 624 ohms with 100% 
biventricular pacing.



TESTING DONE:  12-lead EKG demonstrated AV pacing.  Chest x-ray demonstrates no 
pneumothorax and stable lead placement.  There remains a persistent left lower 
lobe effusion.



LAB WORK:  WBC is 5.75, hemoglobin 11.1, hematocrit 34.7, platelets 134.  
Sodium is 139, potassium 3.7, chloride 100, bicarb 29, BUN 17, creatinine 1.0, 
glucose 85.



PHYSICAL EXAM:  VITAL SIGNS:  Blood pressure is 104/50, pulse is 65, 
respirations 20, temperature 36.7. O2 sat 94% GENERAL:  Patient is alert and 
oriented, sitting up in bed, eating breakfast, in no acute distress.  HEENT:  
Pacemaker site is covered with dry OpSite and gauze dressing.  No blood on 
dressing.   There is mild amount of swelling at the pocket site.  No warmth or 
tenderness.  LUNGS:  There are decreased breath sounds in the left lower lobe.  
CARDIAC:  Regular rate and rhythm with a 1/6 systolic ejection murmur at the 
left sternal border.  ABDOMEN:  Soft and nontender.  EXTREMITIES:  Warm.  No 
discoloration.  No lower extremity edema.



DISCHARGE INSTRUCTIONS:  Post pacemaker activity restrictions were reviewed 
verbally and he was given written instructions at the time of discharge.



DISCHARGE MEDICATIONS:  Please see discharge medication reconciliation.  Of note
, he will restart his warfarin at usual dose tonight.



FOLLOWUP:  He has a pacemaker check and wound check scheduled February 20th at 2
:30 p.m., and to follow up with Dr. Vasquez March 14th at 3:45 p.m.





Job #:  [id number]

MTDD

## 2018-02-13 NOTE — ASMTLACE
LACE

 

Length of stay for            Answers:  Less than 1 day                       

current admission                                                             

Acuity / Level of             Answers:  No                                    

Care: Did the patient                                                         

have an inpatient                                                             

admission?                                                                    

Comorbidities - select        Answers:  Congestive heart failure              

all that apply                                                                

                                        Opioid dependence                     

                                        / Chronic pain                        

                                        Peripheral vascular                   

                                        disease                               

# of Emergency department     Answers:  3-4                                   

visits in the last 6                                                          

months                                                                        

Score: 10

 

Date Signed:  02/13/2018 01:14 PM

Electronically Signed By:Juana Beauchamp RN

## 2018-02-13 NOTE — CPEKG
Heart Rate: 68

RR Interval: 882

P-R Interval: 172

QRSD Interval: 130

QT Interval: 464

QTC Interval: 494

P Axis: 0

QRS Axis: 244

T Wave Axis: 15

EKG Severity - ABNORMAL ECG -

EKG Impression: V-paced rhythm with most likely underlying AF

Electronically Signed By: Markus Haney 13-Feb-2018 11:51:14

## 2018-02-13 NOTE — ASDISCHSUM
----------------------------------------------

Discharge Information

----------------------------------------------

Plan Status:Home with No Needs                       Medically Cleared to Leave:02/12/2018

Discharge Date:02/13/2018 02:04 PM                   CM D/C Disposition:Home, Routine, Self-Care

ADT D/C Disposition:Home, Routine, Self-Care         Projected Discharge Date:02/13/2018 02:04 PM

Transportation at D/C:                               Discharge Delay Reason:

Follow-Up Date:02/13/2018 02:04 PM                   Discharge Slot:

Final Diagnosis:

----------------------------------------------

Placement Information

----------------------------------------------

----------------------------------------------

Patient Contact Information

----------------------------------------------

Contact Name:SHERRIE                           Relationship:Wife

Address:[address]                                [place] [phone]

                                                     Work [phone]

City:[place]                                         Alternate Phone:

State/Zip Code:[place] [address]                              Email:

----------------------------------------------

Financial Information

----------------------------------------------

Financial Class:Medicare

Primary Plan Desc:MEDICARE OUTPATIENT                Primary Plan Number:[id number]

Secondary Plan Desc:AARMYRTLE/MDR SUPPLEMENT              Secondary Plan Number:[id number]

 

 

----------------------------------------------

Assessment Information

----------------------------------------------

----------------------------------------------

LACE

----------------------------------------------

LACE

 

Length of stay for            Answers:  Less than 1 day                       

current admission                                                             

Acuity / Level of             Answers:  No                                    

Care: Did the patient                                                         

have an inpatient                                                             

admission?                                                                    

Comorbidities - select        Answers:  Congestive heart failure              

all that apply                                                                

                                        Opioid dependence                     

                                        / Chronic pain                        

                                        Peripheral vascular                   

                                        disease                               

# of Emergency department     Answers:  3-4                                   

visits in the last 6                                                          

months                                                                        

Score: 10

 

Date Signed:  02/13/2018 01:14 PM

Electronically Signed By:Juana Beauchamp RN

 

 

----------------------------------------------

Case Management Discharge Plan Note

----------------------------------------------

Case Management Discharge

 

Discharge Order Complete?     Answers:  Yes                                   

Patient to Obtain             Answers:  via Family                            

Medications                                                                   

Transportation Arranged       Answers:  Family/Friends                        

Discharge Comments            

Notes:

2/13/2018 Case Management Note



Pt to d/c with family support and follow up as directed by Dr. Haney.  Family to transport 

home.  There are no case management d/c needs identfied.

 

Date Signed:  02/13/2018 01:13 PM

Electronically Signed By:Juana Beauchamp RN

 

 

----------------------------------------------

Intervention Information

----------------------------------------------

Intervention Type:*BUSBY-Signed                       Date of Service:02/13/2018 10:04 AM

Patient Type:Observation                             Staff Member:Liliam Castanon

Hours:                                               Discipline:

Severity:                                            Comment:

## 2018-09-17 NOTE — EDPHY
H & P


Time Seen by Provider: 09/17/18 20:18


HPI/ROS: 





HPI


Head injury, on Coumadin.





83-year-old male by private vehicle.  This patient was returning from a flight 

at the airport today.  He was supposed to get a wheelchair to get him back to 

his transport back up to [place].  There was no wheelchair available so he 

decided to walk.  He was on 1 of the motorized walk ways when he got to the end 

of it and tripped on the interface between the walkway and the regular floor.  

He fell and hit the left side of his head.  He denies significant headache but 

because he is on Coumadin he was instructed by EMTs to go to the emergency 

department to get a CT scan.  That is the reason why he is here.  He complains 

of some mild knee pain and some mild wrist pain on the right side as well.  He 

has not had any nausea or vomiting.  No confusion.  Denies significant headache.





ROS:





Constitutional:  No fever, no chills.  No weakness.


Eyes:  No discharge.  No changes in vision.


Respiratory:  No cough.  No shortness of breath.


Cardiac:  No chest pain, no palpitations.


Genitourinary:  No hematuria.  No dysuria or increased frequency with urination.


Musculoskeletal:  No back pain.  No neck pain.  As above.


Skin:  No lacerations or abrasions.


Neurological:  No headache.  No focal weakness or altered sensation.





Past medical history:  Bone cancer, hip surgery, left lung removed, triple 

bypass in 2012, CHF, COPD, MI, multiple stents, PE, factor 5 Leiden deficiency, 

pacemaker, aortic valve replacement, on Coumadin, INR last week 2.9.  

Cardiologist is Dr. Rushing.  He is on oxygen 24-7.





Social history:  Former smoker.  .  Lives in the [facility] just outside 

of [place].  No alcohol.





Physical Exam:





General Appearance:  Alert, no distress.  This patient is responding to 

questions appropriately and in full sentences.  This patient appears well-

hydrated and well-nourished.


Head:  Normocephalic atraumatic except for a scalp hematoma left mid parietal.  

No bony step-off or deformity noted on palpation of this area..


Face:  Facial bones are stable on palpation.


Eyes:  Pupils equal and round and reactive to light, no pallor or injection.  

No lid erythema or edema.


ENT, Mouth:  Mucous membranes moist.  Dentition is intact.  No malocclusion of 

the jaw.  No tongue lacerations or abrasions.  Pharynx is clear.  The bilateral 

nasal canals are clear.  No septal hematoma.


Respiratory:  There are no retractions, lungs are clear to auscultation with 

good air movement bilaterally.  Chest wall is stable to AP and lateral 

palpation.


Cardiovascular:  Regular rate and rhythm.  No murmur.


Gastrointestinal:  Abdomen is soft and nontender, no masses, bowel sounds 

normal.


Neurological:  Motor sensory function is intact.  Cranial nerves are normal.  

Cerebellar function intact.


Skin:  Warm and dry, no rashes.  No lacerations, abrasions or contusions.


Musculoskeletal:  Neck is supple and nontender.  The trachea is midline.  No 

midline cervical, thoracic, lumbar or sacral tenderness on palpation.  No flank 

tenderness on palpation.


Both knees range without significant pain or impingement.  No effusions.  Both 

knees are stable to valgus and varus stress testing as well as anterior and 

posterior drawer testing.  Extremities are symmetrical, full range of motion.  

All joints in the bilateral upper and bilateral lower extremities range without 

pain or impingement.  No tenderness on palpation of the long bones in the 

bilateral upper and bilateral lower extremities.


Psychiatric:  No agitation.  No depression.





Database:





EKG:





Imaging:





CT head without contrast:  Negative for any acute pathology.  Results were 

discussed with staff radiologist Dr. Antoni Hay.





Procedures:





Emergency department course:





Triage vital signs reviewed.  He is mildly hypertensive.  Vital signs are 

otherwise normal.  Patient sent for CT imaging of his brain.





10:15 p.m., patient re-evaluated, resting comfortably at this time.  He feels 

comfortable going home.  His wife is present in the room.  I discussed the 

results of his CT as noted above.  I feel he is safe for discharge.  Follow-up 

and return to emergency department precautions were reviewed with him.  All of 

his questions were answered.  He was discharged from the emergency department 

in good condition.





Differential Diagnosis:





The differential diagnosis on this patient includes but is not limited to scalp 

hematoma, head injury.  Cervical spine injury, extremity traumatic injury, 

traumatic subarachnoid hemorrhage, subdural hematoma, epidural hematoma unlikely

, other significant traumatic injury unlikely.  This represents a partial list 

of diagnoses considered.  These considerations are based on history, physical 

exam, past history, reassessment and diagnostic testing.


Smoking Status: Never smoked


Constitutional: 


 Initial Vital Signs











Temperature (C)  36.4 C   09/17/18 19:55


 


Heart Rate  81   09/17/18 19:55


 


Respiratory Rate  18   09/17/18 19:55


 


Blood Pressure  141/97 H  09/17/18 19:55


 


O2 Sat (%)  97   09/17/18 19:55








 











O2 Delivery Mode               Nasal Cannula


 


O2 (L/minute)                  2














Allergies/Adverse Reactions: 


 





daptomycin Allergy (Verified 10/02/17 21:01)


 








Home Medications: 














 Medication  Instructions  Recorded


 


Tamsulosin HCl [Flomax 0.4 MG (*)] 0.4 mg PO DAILY 03/13/12


 


Levothyroxine [Synthroid 150 mcg 150 mcg PO DAILY06 12/30/13





(*)]  


 


Nitroglycerin [Nitrostat 0.4 mg 0.4 mg SL Q5M PRN 01/24/17





(*)]  


 


Enzalutamide [Xtandi] 80 mg PO DAILY 10/26/17


 


Ascorbic Acid [Vitamin C 500 mg 1,000 mg PO DAILY 11/03/17





(*)]  


 


Cholecalciferol Vit D3 [Vitamin D3 1,000 units PO DAILY 11/03/17





(*)]  


 


Rosuvastatin Calcium [Crestor] 10 mg PO DAILY 11/03/17


 


Digoxin [Lanoxin 250 mcg (RX)] 125 mcg PO DAILY10  tab 11/15/17


 


Pantoprazole Sodium [Protonix 40mg 40 mg PO BID 11/22/17





(*)]  


 


Carvedilol [Coreg (*)] 3.125 mg PO BIDMEAL  tab 12/01/17


 


Lisinopril [Zestril 2.5 mg (*)] 2.5 mg PO DAILY  tab 12/01/17


 


Calcium Carbonate [Tums 500MG (*)] 500 mg PO DAILY PRN 02/12/18


 


Cholecalciferol Vit D3 [Vitamin D3 1,000 units PO DAILY 02/12/18





(*)]  


 


Ferrous Sulfate [Ferrous Sulf 325 325 mg PO DAILY 02/12/18





MG (*)]  


 


Furosemide [Lasix 40 MG (*)] 40 mg PO BID 02/12/18


 


Herbals/Supplements -Info Only 1 ea PO DAILY 02/12/18


 


Loperamide HCl [Imodium 2 mg (*)] 2 mg PO PRN PRN 02/12/18


 


Temazepam 7.5 mg PO HS PRN 02/12/18


 


Warfarin Sodium [Coumadin 3MG (*)] 6 mg PO DAILY16 02/12/18














Departure





- Departure


Disposition: Home, Routine, Self-Care


Clinical Impression: 


 Head injury





Condition: Good


Instructions:  Head Injury (ED)


Additional Instructions: 


Read and follow provided instructions.





Follow-up with your primary care physician in 1-2 days for re-evaluation as 

needed.





Take your medication as prescribed.





Return to the emergency department for worsening headache, nausea and vomiting, 

confusion or other serious concerns.


Referrals: 


Michael Prado MD [Primary Care Provider] - As per Instructions

## 2018-11-12 ENCOUNTER — HOSPITAL ENCOUNTER (INPATIENT)
Dept: HOSPITAL 80 - FED | Age: 83
LOS: 7 days | Discharge: HOME HEALTH | DRG: 872 | End: 2018-11-19
Attending: SURGERY | Admitting: SURGERY
Payer: COMMERCIAL

## 2018-11-12 DIAGNOSIS — E03.9: ICD-10-CM

## 2018-11-12 DIAGNOSIS — Z95.3: ICD-10-CM

## 2018-11-12 DIAGNOSIS — A41.9: Primary | ICD-10-CM

## 2018-11-12 DIAGNOSIS — K35.80: ICD-10-CM

## 2018-11-12 DIAGNOSIS — Z79.01: ICD-10-CM

## 2018-11-12 DIAGNOSIS — I25.10: ICD-10-CM

## 2018-11-12 DIAGNOSIS — Z85.46: ICD-10-CM

## 2018-11-12 DIAGNOSIS — Z95.1: ICD-10-CM

## 2018-11-12 DIAGNOSIS — J96.11: ICD-10-CM

## 2018-11-12 DIAGNOSIS — E86.9: ICD-10-CM

## 2018-11-12 DIAGNOSIS — Z86.711: ICD-10-CM

## 2018-11-12 DIAGNOSIS — Z86.718: ICD-10-CM

## 2018-11-12 DIAGNOSIS — R65.20: ICD-10-CM

## 2018-11-12 LAB
INR PPP: 3.04 (ref 0.83–1.16)
PLATELET # BLD: 120 10^3/UL (ref 150–400)
PROTHROMBIN TIME: 31.3 SEC (ref 12–15)

## 2018-11-12 NOTE — PDGENHP
History and Physical





- Chief Complaint


abdominal pain, fever, nause





- History of Present Illness


85 yo M with extensive PMH that includes CAD s/p CABG, VHD sp TAVR, Factor V 

Leiden with recurrent PE/DVT on chronic AC, chronic systolic/diastolic HF with 

EF of 20-30% as well as chronic respiratory failures as well as several 

different cancers (prostate, lung and sarcoma) presenting with abdominal pain, 

nausea and fever. He notes the pain was severe yesterday and today and located 

below his umbilicus. He has not had similar pain in the past. He notes that his 

temperature was up to 100.5. He did have one loose BM today as well. He 

currently states he overall feels better and that the pain is largely resolved. 

He has had low BP while in the ER and notes that his BP always runs on the low 

end due to his CHF, generally with SBP of .





History Information





- Allergies/Home Medication List


Allergies/Adverse Reactions: 








daptomycin Allergy (Verified 11/12/18 15:25)


 





Home Medications: 








Tamsulosin HCl [Flomax 0.4 MG (*)] 0.4 mg PO DAILY 03/13/12 [Last Taken 11/12/ 18 08:00]


Levothyroxine [Synthroid 150 mcg (*)] 150 mcg PO DAILY06 12/30/13 [Last Taken 11 /12/18 08:00]


Nitroglycerin [Nitrostat 0.4 mg (*)] 0.4 mg SL Q5M PRN 01/24/17 [Last Taken 03/ 24/17]


Enzalutamide [Xtandi] 80 mg PO DAILY 10/26/17 [Last Taken 11/12/18 08:00]


Ascorbic Acid [Vitamin C 500 mg (*)] 1,000 mg PO DAILY 11/03/17 [Last Taken 11/ 12/18 08:00]


Cholecalciferol Vit D3 [Vitamin D3 (*)] 1,000 units PO DAILY 11/03/17 [Last 

Taken 11/12/18 08:00]


Rosuvastatin Calcium [Crestor] 10 mg PO DAILY 11/03/17 [Last Taken 11/12/18 08:

00]


Pantoprazole Sodium [Protonix 40mg (*)] 40 mg PO DAILY 11/22/17 [Last Taken 11/ 12/18 08:00]


Ferrous Sulfate [Ferrous Sulf 325 MG (*)] 325 mg PO DAILY 02/12/18 [Last Taken 

11/12/18 08:00]


Furosemide [Lasix 40 MG (*)] 20 - 40 mg PO DAILY 02/12/18 [Last Taken 11/12/18 

08:00]


Temazepam 7.5 mg PO HS PRN 02/12/18 [Last Taken 02/11/18]


Warfarin Sodium [Coumadin 1MG (*)] 0.5 mg PO SUMOTUTHFR@08 11/12/18 [Last Taken 

11/12/18]


Warfarin Sodium [Coumadin 1MG (*)] 1 mg PO WESA@08 11/12/18 [Last Taken 11/10/18

]


Warfarin Sodium [Coumadin 5MG (*)] 5 mg PO DAILY@08 11/12/18 [Last Taken 11/12/ 18]





I have personally reviewed and updated: family history, medical history, social 

history, surgical history





- Past Medical History


atrial fibrillation, coronary artery disease, cancer (prostate, lung, sarcoma), 

CHF (systolic with EF of 20-30% and diastolic), DVT, GI bleed, hypertension, 

hyperlipidemia, myocardial infarction, pulmonary embolism


Additional medical history: Asystolic cardiac arrest 1/2017, s/p BiV PPM.  

Coronary artery disease status post CABG.  chronic respiratory failure on 2L at 

baseline.  Lungs sarcoma, status post partial resection of left lung along with 

radiation 20 years ago.  Factor 5 Leiden mutation with 3 pulmonary emboli in 

the past.  VHD--moderate MR, hx of TAVR





- Surgical History


Reports: angioplasty, coronary bypass surgery, coronary stent


Additional surgical history: TAVR.  LLL lung resection.  PPM.  hernia





- Family History


Positive for: CAD





- Social History


Smoking Status: Never smoked


Alcohol Use: None


Drug Use: None


Additional social history: Lives with wife, walks independently





Review of Systems


Review of Systems: 





ROS: 10pt was reviewed & negative except for what was stated in HPI & below





Physical Exam


Physical Exam: 

















Temp Pulse Resp BP Pulse Ox


 


 36.9 C   88   28 H  97/51 L  99 


 


 11/12/18 21:40  11/12/18 21:40  11/12/18 21:40  11/12/18 21:40  11/12/18 21:40




















O2 (L/minute)                  2














Constitutional: no apparent distress, appears nourished


Eyes: PERRL, anicteric sclera


Ears, Nose, Mouth, Throat: moist mucous membranes, hearing normal


Cardiovascular: regular rate and rhythym, systolic murmur, pulses symmetric 

bilaterally, No edema


Respiratory: no respiratory distress, no rales or rhonchi


Gastrointestinal: tenderness, No normoactive bowel sounds, No guarding, No 

rebound


Genitourinary: no bladder tenderness


Skin: warm, normal color


Musculoskeletal: full muscle strength


Neurologic: AAOx3


Psychiatric: interacting appropriately, not anxious, not encephalopathic





Lab Data & Imaging Review





 11/12/18 16:05





 11/12/18 16:05














WBC  16.18 10^3/uL (3.80-9.50)  H  11/12/18  16:05    


 


RBC  4.20 10^6/uL (4.40-6.38)  L  11/12/18  16:05    


 


Hgb  13.6 g/dL (13.7-17.5)  L  11/12/18  16:05    


 


Hct  40.9 % (40.0-51.0)   11/12/18  16:05    


 


MCV  97.4 fL (81.5-99.8)   11/12/18  16:05    


 


MCH  32.4 pg (27.9-34.1)   11/12/18  16:05    


 


MCHC  33.3 g/dL (32.4-36.7)   11/12/18  16:05    


 


RDW  14.8 % (11.5-15.2)   11/12/18  16:05    


 


Plt Count  120 10^3/uL (150-400)  L  11/12/18  16:05    


 


MPV  10.8 fL (8.7-11.7)   11/12/18  16:05    


 


Neut % (Auto)  Not Reported   11/12/18  16:05    


 


Lymph % (Auto)  Not Reported   11/12/18  16:05    


 


Mono % (Auto)  Not Reported   11/12/18  16:05    


 


Eos % (Auto)  Not Reported   11/12/18  16:05    


 


Baso % (Auto)  Not Reported   11/12/18  16:05    


 


Nucleat RBC Rel Count  Not Reported   11/12/18  16:05    


 


Absolute Neuts (auto)  Not Reported   11/12/18  16:05    


 


Absolute Lymphs (auto)  Not Reported   11/12/18  16:05    


 


Absolute Monos (auto)  Not Reported   11/12/18  16:05    


 


Absolute Eos (auto)  Not Reported   11/12/18  16:05    


 


Absolute Basos (auto)  Not Reported   11/12/18  16:05    


 


Absolute Nucleated RBC  Not Reported   11/12/18  16:05    


 


Immature Gran %  Not Reported   11/12/18  16:05    


 


Seg Neutrophils %  70.0 %  11/12/18  16:05    


 


Band Neutrophils %  4.0 %  11/12/18  16:05    


 


Lymphocytes %  1.0 %  11/12/18  16:05    


 


Monocytes %  25.0 %  11/12/18  16:05    


 


Eosinophils %  0.0 %  11/12/18  16:05    


 


Basophils %  0.0 %  11/12/18  16:05    


 


Metamyelocytes %  0.0 %  11/12/18  16:05    


 


Myelocytes %  0.0 %  11/12/18  16:05    


 


Promyelocytes %  0.0 %  11/12/18  16:05    


 


Blast Cells %  0.0 %  11/12/18  16:05    


 


Immature Gran #  Not Reported   11/12/18  16:05    


 


Absolute Seg Neuts  11.33 10^3/uL (1.70-6.50)  H  11/12/18  16:05    


 


Absolute Band Neuts  0.65 10^3/uL (0.00-0.70)   11/12/18  16:05    


 


Absolute Lymphocytes  0.16 10^3/uL (1.00-3.00)  L  11/12/18  16:05    


 


Absolute Monocytes  4.05 10^3/uL (0.30-0.80)  H  11/12/18  16:05    


 


Absolute Eosinophils  0.00 10^3/uL (0.03-0.40)  L  11/12/18  16:05    


 


Absolute Basophils  0.00 10^3/uL (0.02-0.10)  L  11/12/18  16:05    


 


Absolute Metamyelocyte  0.00 10^3/mL (0.00-0.00)   11/12/18  16:05    


 


Absolute Myelocytes  0.00 10^3/mL (0.00-0.00)   11/12/18  16:05    


 


Absolute Promyelocytes  0.00 10^3/uL (0.00-0.00)   11/12/18  16:05    


 


Absolute Plasma Cells  0.00 10^3/uL (0.00-0.00)   11/12/18  16:05    


 


Nucleated RBCs  0 /100 WBC (0-0)   11/12/18  16:05    


 


Absolute Blast Cells  0.00 10^3/uL (0.00-0.00)   11/12/18  16:05    


 


Plasma Cells %  0.0 %  11/12/18  16:05    


 


Platelet Estimate  DECREASED  (ADEQ)  L  11/12/18  16:05    


 


Oval Macrocytes  1+  H  11/12/18  16:05    


 


PT  31.3 SEC (12.0-15.0)  H  11/12/18  16:05    


 


INR  3.04  (0.83-1.16)  H  11/12/18  16:05    


 


APTT  56.5 SEC (23.0-38.0)  H  11/12/18  16:05    


 


VBG Lactic Acid  1.4 mmol/L (0.7-2.1)   11/12/18  16:05    


 


Sodium  136 mEq/L (135-145)   11/12/18  16:05    


 


Potassium  4.2 mEq/L (3.3-5.0)   11/12/18  16:05    


 


Chloride  94 mEq/L ()  L  11/12/18  16:05    


 


Carbon Dioxide  33 mEq/l (22-31)  H  11/12/18  16:05    


 


Anion Gap  9 mEq/L (6-14)   11/12/18  16:05    


 


BUN  19 mg/dL (7-23)   11/12/18  16:05    


 


Creatinine  1.0 mg/dL (0.7-1.3)   11/12/18  16:05    


 


Estimated GFR  > 60   11/12/18  16:05    


 


Glucose  109 mg/dL ()  H  11/12/18  16:05    


 


Calcium  9.1 mg/dL (8.5-10.4)   11/12/18  16:05    


 


Total Bilirubin  1.9 mg/dL (0.1-1.4)  H  11/12/18  16:05    


 


Conjugated Bilirubin  0.2 mg/dL (0.0-0.5)   11/12/18  16:05    


 


Unconjugated Bilirubin  1.7 mg/dL (0.0-1.1)  H  11/12/18  16:05    


 


AST  24 IU/L (17-59)   11/12/18  16:05    


 


ALT  21 IU/L (21-72)   11/12/18  16:05    


 


Alkaline Phosphatase  68 IU/L ()   11/12/18  16:05    


 


Total Protein  6.3 g/dL (6.3-8.2)   11/12/18  16:05    


 


Albumin  3.5 g/dL (3.5-5.0)   11/12/18  16:05    


 


Lipase  22 IU/L ()  L  11/12/18  16:05    


 


Urine Color  YELLOW   11/12/18  17:00    


 


Urine Appearance  CLEAR   11/12/18  17:00    


 


Urine pH  5.0  (5.0-7.5)   11/12/18  17:00    


 


Ur Specific Gravity  1.005  (1.002-1.030)   11/12/18  17:00    


 


Urine Protein  NEGATIVE  (NEGATIVE)   11/12/18  17:00    


 


Urine Ketones  NEGATIVE  (NEGATIVE)   11/12/18  17:00    


 


Urine Blood  2+  (NEGATIVE)  H  11/12/18  17:00    


 


Urine Nitrate  NEGATIVE  (NEGATIVE)   11/12/18  17:00    


 


Urine Bilirubin  NEGATIVE  (NEGATIVE)   11/12/18  17:00    


 


Urine Urobilinogen  NEGATIVE EU (0.2-1.0)   11/12/18  17:00    


 


Ur Leukocyte Esterase  NEGATIVE  (NEGATIVE)   11/12/18  17:00    


 


Urine RBC  3-5 /hpf (0-3)  H  11/12/18  17:00    


 


Urine WBC  0-1 /hpf (0-3)   11/12/18  17:00    


 


Ur Epithelial Cells  NONE SEEN /lpf (NONE-1+)   11/12/18  17:00    


 


Urine Mucus  TRACE /lpf (NONE-1+)   11/12/18  17:00    


 


Urine Glucose  NEGATIVE  (NEGATIVE)   11/12/18  17:00    








Visualized and Interpreted Chest x-ray results: Yes


Chest X-Ray results: other (minimal interstitial edema, mild bilateral pleural 

effusoin)


Visualized and Interpreted imaging results: Yes


Interpretation: abd CT: appendicitis with microperforation





Assessment & Plan


Assessment: 








Acute appendicitis (Acute)


Septic shock (Acute)





85 yo M with MMI presenting with acute appendicitis with microperforation and 

severe sepsis





# acute appendicitis: with evidence of microperforation, patient initially 

admitted to gen surg but decision made to proceed with non operative mgmt and 

given multiple medical issues will be admitted to medicine. Started empirically 

on ctx/flagyl, will monitor in SDU. Cultures pending.


# severe sepsis: with associated hypotension that is at least in part baseline 

for him, lactate of 1.4, bili elevated above baseline at 1.9 but renal function 

at baseline. Monitoring in SDU. 


# factor V Leiden with recurrent PE/DVT: patient on chronic AC, given acute 

appendicitis will hold coumadin for now in case surgery becomes neccessary and 

allow INR to trend down, will resume as soon as it is clear that surgery not 

needed


# chronic systolic/diastolic heart failure: appears euvolemic currently, 

holding BB, digoxin and lasix given hypotension


# CAD: with hx of CABG, no c/o chest pain currently, will monitor on tele


# chronic respiratory failure: at  his baseline o2 requirement


# prostate cancer: holding oral chemotherapeutic med for now


# hx of lung cancer/sarcoma: s/p surgical intervention and not reportedly 

issues currently


# IP status, will need to watch in SDU given significant hypotension


Patient new to my care. Old records reviewed and summarized as above. Care plan 

reviewed with Dr. Solomon and ER doctor. 


> 35 min critical care time spent in care of patient in review of labs/imaging 

and review of care plan with other doctors

## 2018-11-12 NOTE — EDPHY
H & P


Stated Complaint: abd pain, weakness


Time Seen by Provider: 11/12/18 15:47


HPI/ROS: 





CHIEF COMPLAINT:  Suprapubic pain, fever, vomiting





HISTORY OF PRESENT ILLNESS:  The patient is an 84-year-old man with history of 

coronary artery disease status post CABG, CHF, severe aortic stenosis status 

post TAVR, pacemaker, factor 5 Leiden deficiency and PE on Coumadin as well as 

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 

of lung cancer with removal of a lobe of his left lung.  He reports beginning 

to have pain yesterday.  He does complain of some dysuria yesterday.  His wife 

reports that he had a temperature of 100.5 degrees.  He is now afebrile but is 

hypotensive.  He states that his blood pressure is always slightly low but 

usually around 100 systolic.  He did have 1 episode of loose stool today 

nonbloody.  No history of abdominal surgeries.


Severity:  Severe


Modifying factors:  None





REVIEW OF SYSTEMS:


Constitutional:  denies: chills, fever, recent illness, recent injury


EENTM: denies: blurred vision, double vision, nose congestion


Respiratory: denies: cough, shortness of breath


Cardiac: denies: chest pain, irregular heart rate, lightheadedness, palpitations


Gastrointestinal/Abdominal: denies: abdominal pain, diarrhea, nausea, vomiting, 

blood streaked stools


Genitourinary: denies: dysuria, frequency, hematuria, pain


Musculoskeletal: denies: joint pain, muscle pain


Skin: denies: lesions, rash, jaundice, bruising


Neurological: denies: headache, numbness, paresthesia, tingling, dizziness, 

weakness


Hematologic/Lymphatic: denies: blood clots, easy bleeding, easy bruising


Immunologic/allergic: denies: HIV/AIDS, transplant


 10 systems reviewed and negative except as noted





EXAM:


GENERAL:  Well-appearing, well-nourished and in no acute distress.


HEAD:  Atraumatic, normocephalic.


EYES:  Pupils equal round and reactive to light, extraocular movements intact, 

sclera anicteric, conjunctiva are normal.


ENT:  TMs normal, nares patent, oropharynx clear without exudates.  Moist 

mucous membranes.


NECK:  Normal range of motion, supple without lymphadenopathy or JVD.


LUNGS:  Breath sounds clear to auscultation bilaterally and equal.  No wheezes 

rales or rhonchi.


HEART:  Regular rate and rhythm without murmurs, rubs or gallops.


ABDOMEN:  Suprapubic tenderness, mild right lower quadrant tenderness, 

normoactive bowel sounds.  No guarding, no rebound.  No masses appreciated. 


BACK:  No CVA tenderness, no spinal tenderness, step-offs or deformities


EXTREMITIES:  Normal range of motion, no pitting or edema.  No clubbing or 

cyanosis.


NEUROLOGICAL:  Cranial nerves II through XII grossly intact.  Normal speech, 

normal gait.  5/5 strength, normal movement in all extremities, normal sensation

, normal reflexes


PSYCH:  Normal mood, normal affect.


SKIN:  Warm, dry, normal turgor, no visible rashes or lesions.








Source: Patient


Exam Limitations: No limitations





- Personal History


Current Tetanus/Diphtheria Vaccine: Unsure


Current Tetanus Diphtheria and Acellular Pertussis (TDAP): Unsure


Tetanus Vaccine Date: 2007





- Medical/Surgical History


Hx Asthma: No


Hx Chronic Respiratory Disease: Yes


Hx Diabetes: No


Hx Cardiac Disease: Yes


Hx Renal Disease: No


Hx Cirrhosis: No


Hx Alcoholism: No


Hx HIV/AIDS: No


Hx Splenectomy or Spleen Trauma: No


Other PMH: L LUNG CA REMOVAL, HIP SURG.  TRIPLE BYPASS 2012. Prostate CA Mets/

bone, CHF, COPD, stents 2014, PE, Factor V, pacemaker 1/17, Aortic stenosis, 

Aortic valve replacement (TAVR)





- Family History


Significant Family History: No pertinent family hx





- Social History


Smoking Status: Never smoked


Alcohol Use: None


Drug Use: None


Constitutional: 


 Initial Vital Signs











Temperature (C)  37.4 C   11/12/18 15:25


 


Heart Rate  88   11/12/18 15:25


 


Respiratory Rate  16   11/12/18 15:25


 


Blood Pressure  80/50 L  11/12/18 15:25


 


O2 Sat (%)  94   11/12/18 15:25








 











O2 Delivery Mode               Nasal Cannula


 


O2 (L/minute)                  2














Allergies/Adverse Reactions: 


 





daptomycin Allergy (Verified 11/12/18 15:25)


 








Home Medications: 














 Medication  Instructions  Recorded


 


Tamsulosin HCl [Flomax 0.4 MG (*)] 0.4 mg PO DAILY 03/13/12


 


Levothyroxine [Synthroid 150 mcg 150 mcg PO DAILY06 12/30/13





(*)]  


 


Nitroglycerin [Nitrostat 0.4 mg 0.4 mg SL Q5M PRN 01/24/17





(*)]  


 


Enzalutamide [Xtandi] 80 mg PO DAILY 10/26/17


 


Ascorbic Acid [Vitamin C 500 mg 1,000 mg PO DAILY 11/03/17





(*)]  


 


Cholecalciferol Vit D3 [Vitamin D3 1,000 units PO DAILY 11/03/17





(*)]  


 


Rosuvastatin Calcium [Crestor] 10 mg PO DAILY 11/03/17


 


Digoxin [Lanoxin 250 mcg (RX)] 125 mcg PO DAILY10  tab 11/15/17


 


Pantoprazole Sodium [Protonix 40mg 40 mg PO DAILY 11/22/17





(*)]  


 


Carvedilol [Coreg (*)] 3.125 mg PO BIDMEAL  tab 12/01/17


 


Ferrous Sulfate [Ferrous Sulf 325 325 mg PO DAILY 02/12/18





MG (*)]  


 


Furosemide [Lasix 40 MG (*)] 20 - 40 mg PO DAILY 02/12/18


 


Temazepam 7.5 mg PO HS PRN 02/12/18


 


Warfarin Sodium [Coumadin 1MG (*)] 0.5 mg PO SUMOTUTHFR@08 11/12/18


 


Warfarin Sodium [Coumadin 1MG (*)] 1 mg PO WESA@08 11/12/18


 


Warfarin Sodium [Coumadin 5MG (*)] 5 mg PO DAILY@08 11/12/18














Medical Decision Making





- Diagnostics


Imaging Results: 


 Imaging Impressions





Chest X-Ray  11/12/18 15:58


Impression:


1.  Minimal interstitial edema versus viral pneumonitis.


2.  Bibasilar atelectasis and trace bilateral pleural effusions.








Abdomen CT  11/12/18 17:34


Impression: 


1. Appendicitis with microperforation. No abscess or pneumoperitoneum.


2. Extensive diverticulosis. No acute diverticulitis.


3. Cholelithiasis. No biliary obstruction or choledocholithiasis.


4. Chronic small bilateral pleural effusions, minimally improved on the right 

since October 2017.


5. Sclerotic bone metastasis unchanged. No new bone lesion.


 


Findings discussed with Emergency Department physician, Didier Silver on 11/12 /2018, 18:45.


 


 











Imaging: Discussed imaging studies w/ On call Radiologist


ED Course/Re-evaluation: 





5:00 p.m. the patient's qualifies for severe sepsis.  He remains slightly 

hypotensive with a map of 61.  His lactate is not elevated.  He has been given 

a L of fluid and his blood pressures has improved.  We will give the 2nd L of 

the sepsis bolus over a longer period of time because of his history of CHF and 

valvular disease.  No shortness of breath currently.  The patient just now 

gives a urine sample.





5:30 p.m. the patient's urinalysis is not revealing.  I will order CT scan to 

evaluate his suprapubic pain.





6:30 p.m. the patient's blood pressure is improving.  After 2 L she now has a 

map greater than 65.  We will continue to observe and give fluid slowly.  CT 

pending.





6:45 p.m. I spoke with Dr. Jim Solomon who will come to evaluate and will 

admit.  He requested we had Flagyl.  He is hoping to manage medically.  I then 

received a call back from Radiology stating the patient does have a small 

perforation with some extraluminal bubbles.





8:20 p.m. I spoke with Dr. Lozano who will admit to medical service Dr. Solomon 

will consult and is evaluating the patient currently.


Differential Diagnosis: 





Partial list of the Differential diagnosis considered include but were not 

limited to;  urinary tract infection, septic shock, appendicitis and although 

unlikely based on the history and physical exam, I also considered aneurysm, 

kidney stone, diverticulitis.  


Critical Care Time: 





Critical care time spent by me, Dr. Silver exclusive with this patient was 45 

minutes, exclusive of the PA time exclusive of procedures.  The organ system 

that was at risk was cardiovascular and I gave IV fluids, antibiotics, 

diagnostics, consultation and admission to prevent worsening of the patient's 

condition





- Data Points


Laboratory Results: 


 Laboratory Results





 11/12/18 16:05 





 11/12/18 16:05 





 











  11/12/18 11/12/18 11/12/18





  17:00 16:05 16:05


 


WBC      





    


 


RBC      





    


 


Hgb      





    


 


Hct      





    


 


MCV      





    


 


MCH      





    


 


MCHC      





    


 


RDW      





    


 


Plt Count      





    


 


MPV      





    


 


Neut % (Auto)      





    


 


Lymph % (Auto)      





    


 


Mono % (Auto)      





    


 


Eos % (Auto)      





    


 


Baso % (Auto)      





    


 


Nucleat RBC Rel Count      





    


 


Absolute Neuts (auto)      





    


 


Absolute Lymphs (auto)      





    


 


Absolute Monos (auto)      





    


 


Absolute Eos (auto)      





    


 


Absolute Basos (auto)      





    


 


Absolute Nucleated RBC      





    


 


Immature Gran %      





    


 


Seg Neutrophils %      





    


 


Band Neutrophils %      





    


 


Lymphocytes %      





    


 


Monocytes %      





    


 


Eosinophils %      





    


 


Basophils %      





    


 


Metamyelocytes %      





    


 


Myelocytes %      





    


 


Promyelocytes %      





    


 


Blast Cells %      





    


 


Immature Gran #      





    


 


Absolute Seg Neuts      





    


 


Absolute Band Neuts      





    


 


Absolute Lymphocytes      





    


 


Absolute Monocytes      





    


 


Absolute Eosinophils      





    


 


Absolute Basophils      





    


 


Absolute Metamyelocyte      





    


 


Absolute Myelocytes      





    


 


Absolute Promyelocytes      





    


 


Absolute Plasma Cells      





    


 


Nucleated RBCs      





    


 


Absolute Blast Cells      





    


 


Plasma Cells %      





    


 


Platelet Estimate      





    


 


Oval Macrocytes      





    


 


PT    31.3 SEC H SEC  





    (12.0-15.0)  


 


INR    3.04  H   





    (0.83-1.16)  


 


APTT    56.5 SEC H SEC  





    (23.0-38.0)  


 


VBG Lactic Acid      1.4 mmol/L mmol/L





     (0.7-2.1) 


 


Sodium      





    


 


Potassium      





    


 


Chloride      





    


 


Carbon Dioxide      





    


 


Anion Gap      





    


 


BUN      





    


 


Creatinine      





    


 


Estimated GFR      





    


 


Glucose      





    


 


Calcium      





    


 


Total Bilirubin      





    


 


Conjugated Bilirubin      





    


 


Unconjugated Bilirubin      





    


 


AST      





    


 


ALT      





    


 


Alkaline Phosphatase      





    


 


Total Protein      





    


 


Albumin      





    


 


Lipase      





    


 


Urine Color  YELLOW     





    


 


Urine Appearance  CLEAR     





    


 


Urine pH  5.0     





   (5.0-7.5)   


 


Ur Specific Gravity  1.005     





   (1.002-1.030)   


 


Urine Protein  NEGATIVE     





   (NEGATIVE)   


 


Urine Ketones  NEGATIVE     





   (NEGATIVE)   


 


Urine Blood  2+  H     





   (NEGATIVE)   


 


Urine Nitrate  NEGATIVE     





   (NEGATIVE)   


 


Urine Bilirubin  NEGATIVE     





   (NEGATIVE)   


 


Urine Urobilinogen  NEGATIVE EU EU    





   (0.2-1.0)   


 


Ur Leukocyte Esterase  NEGATIVE     





   (NEGATIVE)   


 


Urine RBC  3-5 /hpf H /hpf    





   (0-3)   


 


Urine WBC  0-1 /hpf /hpf    





   (0-3)   


 


Ur Epithelial Cells  NONE SEEN /lpf /lpf    





   (NONE-1+)   


 


Urine Mucus  TRACE /lpf /lpf    





   (NONE-1+)   


 


Urine Glucose  NEGATIVE     





   (NEGATIVE)   














  11/12/18 11/12/18





  16:05 16:05


 


WBC    16.18 10^3/uL H 10^3/uL





    (3.80-9.50) 


 


RBC    4.20 10^6/uL L 10^6/uL





    (4.40-6.38) 


 


Hgb    13.6 g/dL L g/dL





    (13.7-17.5) 


 


Hct    40.9 % %





    (40.0-51.0) 


 


MCV    97.4 fL fL





    (81.5-99.8) 


 


MCH    32.4 pg pg





    (27.9-34.1) 


 


MCHC    33.3 g/dL g/dL





    (32.4-36.7) 


 


RDW    14.8 % %





    (11.5-15.2) 


 


Plt Count    120 10^3/uL L 10^3/uL





    (150-400) 


 


MPV    10.8 fL fL





    (8.7-11.7) 


 


Neut % (Auto)    Not Reported 





   


 


Lymph % (Auto)    Not Reported 





   


 


Mono % (Auto)    Not Reported 





   


 


Eos % (Auto)    Not Reported 





   


 


Baso % (Auto)    Not Reported 





   


 


Nucleat RBC Rel Count    Not Reported 





   


 


Absolute Neuts (auto)    Not Reported 





   


 


Absolute Lymphs (auto)    Not Reported 





   


 


Absolute Monos (auto)    Not Reported 





   


 


Absolute Eos (auto)    Not Reported 





   


 


Absolute Basos (auto)    Not Reported 





   


 


Absolute Nucleated RBC    Not Reported 





   


 


Immature Gran %    Not Reported 





   


 


Seg Neutrophils %    70.0 % %





   


 


Band Neutrophils %    4.0 % %





   


 


Lymphocytes %    1.0 % %





   


 


Monocytes %    25.0 % %





   


 


Eosinophils %    0.0 % %





   


 


Basophils %    0.0 % %





   


 


Metamyelocytes %    0.0 % %





   


 


Myelocytes %    0.0 % %





   


 


Promyelocytes %    0.0 % %





   


 


Blast Cells %    0.0 % %





   


 


Immature Gran #    Not Reported 





   


 


Absolute Seg Neuts    11.33 10^3/uL H 10^3/uL





    (1.70-6.50) 


 


Absolute Band Neuts    0.65 10^3/uL 10^3/uL





    (0.00-0.70) 


 


Absolute Lymphocytes    0.16 10^3/uL L 10^3/uL





    (1.00-3.00) 


 


Absolute Monocytes    4.05 10^3/uL H 10^3/uL





    (0.30-0.80) 


 


Absolute Eosinophils    0.00 10^3/uL L 10^3/uL





    (0.03-0.40) 


 


Absolute Basophils    0.00 10^3/uL L 10^3/uL





    (0.02-0.10) 


 


Absolute Metamyelocyte    0.00 10^3/mL 10^3/mL





    (0.00-0.00) 


 


Absolute Myelocytes    0.00 10^3/mL 10^3/mL





    (0.00-0.00) 


 


Absolute Promyelocytes    0.00 10^3/uL 10^3/uL





    (0.00-0.00) 


 


Absolute Plasma Cells    0.00 10^3/uL 10^3/uL





    (0.00-0.00) 


 


Nucleated RBCs    0 /100 WBC /100 WBC





    (0-0) 


 


Absolute Blast Cells    0.00 10^3/uL 10^3/uL





    (0.00-0.00) 


 


Plasma Cells %    0.0 % %





   


 


Platelet Estimate    DECREASED  L 





    (ADEQ) 


 


Oval Macrocytes    1+  H 





   


 


PT    





   


 


INR    





   


 


APTT    





   


 


VBG Lactic Acid    





   


 


Sodium  136 mEq/L mEq/L  





   (135-145)  


 


Potassium  4.2 mEq/L mEq/L  





   (3.3-5.0)  


 


Chloride  94 mEq/L L mEq/L  





   ()  


 


Carbon Dioxide  33 mEq/l H mEq/l  





   (22-31)  


 


Anion Gap  9 mEq/L mEq/L  





   (6-14)  


 


BUN  19 mg/dL mg/dL  





   (7-23)  


 


Creatinine  1.0 mg/dL mg/dL  





   (0.7-1.3)  


 


Estimated GFR  > 60   





   


 


Glucose  109 mg/dL H mg/dL  





   ()  


 


Calcium  9.1 mg/dL mg/dL  





   (8.5-10.4)  


 


Total Bilirubin  1.9 mg/dL H mg/dL  





   (0.1-1.4)  


 


Conjugated Bilirubin  0.2 mg/dL mg/dL  





   (0.0-0.5)  


 


Unconjugated Bilirubin  1.7 mg/dL H mg/dL  





   (0.0-1.1)  


 


AST  24 IU/L IU/L  





   (17-59)  


 


ALT  21 IU/L IU/L  





   (21-72)  


 


Alkaline Phosphatase  68 IU/L IU/L  





   ()  


 


Total Protein  6.3 g/dL g/dL  





   (6.3-8.2)  


 


Albumin  3.5 g/dL g/dL  





   (3.5-5.0)  


 


Lipase  22 IU/L L IU/L  





   ()  


 


Urine Color    





   


 


Urine Appearance    





   


 


Urine pH    





   


 


Ur Specific Gravity    





   


 


Urine Protein    





   


 


Urine Ketones    





   


 


Urine Blood    





   


 


Urine Nitrate    





   


 


Urine Bilirubin    





   


 


Urine Urobilinogen    





   


 


Ur Leukocyte Esterase    





   


 


Urine RBC    





   


 


Urine WBC    





   


 


Ur Epithelial Cells    





   


 


Urine Mucus    





   


 


Urine Glucose    





   











Medications Given: 


 





Sodium Chloride (Ns)  2,000 mls @ 333.3333 mls/hr 30 ml/kg infuse over 6 hr (

2000 ml) IV EDNOW ONE


   PRN Reason: Protocol


   Stop: 11/12/18 23:00


   Last Admin: 11/12/18 17:21 Dose:  2,000 mls





Discontinued Medications





Sodium Chloride (Ns)  1,000 mls @ 0 mls/hr IV EDNOW ONE; Wide Open


   PRN Reason: Protocol


   Stop: 11/12/18 15:56


   Last Admin: 11/12/18 16:25 Dose:  1,000 mls


Ceftriaxone Sodium/Dextrose (Rocephin 1 Gm (Premix))  50 mls @ 100 mls/hr IV 

EDNOW ONE


   PRN Reason: Protocol


   Stop: 11/12/18 17:30


   Last Admin: 11/12/18 17:22 Dose:  50 mls


Metronidazole/Sodium Chloride (Flagyl 500 Mg (Premix))  100 mls @ 100 mls/hr IV 

EDNOW ONE


   PRN Reason: Protocol


   Stop: 11/12/18 19:41


   Last Admin: 11/12/18 18:56 Dose:  100 mls


Sodium Chloride (Ns)  1,000 mls @ 0 mls/hr IV ONCE ONE


   PRN Reason: Wide Open


   Stop: 11/12/18 19:30


   Last Admin: 11/12/18 19:30 Dose:  1,000 mls


Ondansetron HCl (Zofran)  4 mg IVP EDNOW ONE


   Stop: 11/12/18 15:56


   Last Admin: 11/12/18 16:25 Dose:  4 mg








Departure





- Departure


Disposition: [facility]


Clinical Impression: 


 Septic shock





Acute appendicitis


Qualifiers:


 Acute appendicitis type: unspecified acute appendicitis type Qualified Code(s)

: K35.80 - Unspecified acute appendicitis





Condition: Critical

## 2018-11-13 LAB
INR PPP: 4.15 (ref 0.83–1.16)
PLATELET # BLD: 96 10^3/UL (ref 150–400)
PROTHROMBIN TIME: 39.7 SEC (ref 12–15)

## 2018-11-13 RX ADMIN — ACETAMINOPHEN PRN MG: 325 TABLET ORAL at 16:29

## 2018-11-13 RX ADMIN — LEVOTHYROXINE SODIUM SCH MCG: 150 TABLET ORAL at 06:36

## 2018-11-13 RX ADMIN — PANTOPRAZOLE SODIUM SCH MG: 40 TABLET, DELAYED RELEASE ORAL at 08:47

## 2018-11-13 RX ADMIN — IRON SUPPLEMENT SCH MG: 325 TABLET ORAL at 08:47

## 2018-11-13 RX ADMIN — ACETAMINOPHEN PRN MG: 325 TABLET ORAL at 03:58

## 2018-11-13 RX ADMIN — OXYCODONE HYDROCHLORIDE AND ACETAMINOPHEN SCH MG: 500 TABLET ORAL at 08:46

## 2018-11-13 RX ADMIN — ROSUVASTATIN CALCIUM SCH MG: 10 TABLET, FILM COATED ORAL at 08:46

## 2018-11-13 RX ADMIN — FUROSEMIDE SCH MG: 40 TABLET ORAL at 12:11

## 2018-11-13 NOTE — SOAPPROG
SOAP Progress Note


Assessment/Plan: 


Assessment:no overnight concerns. pain markedly better.  no further nausea.  

afebrile. bp 120's.  up in chair, moving easily.  comfortable.  abd soft, mild 

dist, min tender - notably less than last violet.  WBC 9.  INR 4.  acute 

appendicitis with microperf - clinically improved with IV fluids and ABX 

overnight.  hoping for non-op mgmnt.  cont present ABX mgmnt.  regular diet. 

cont to hold coumadin.  apprec hospitalist group assist.   


























Plan:





11/13/18 16:41





Objective: 





 Vital Signs











Temp Pulse Resp BP Pulse Ox


 


 37.8 C   80   22 H  111/59 L  99 


 


 11/13/18 15:52  11/13/18 15:52  11/13/18 15:52  11/13/18 15:52  11/13/18 15:52








 Laboratory Results





 11/13/18 04:00 





 11/13/18 04:00 





 











 11/12/18 11/13/18 11/14/18





 05:59 05:59 05:59


 


Intake Total  272 600


 


Output Total  300 175


 


Balance  -28 425








 











PT  39.7 SEC (12.0-15.0)  H  11/13/18  04:00    


 


INR  4.15  (0.83-1.16)  H  11/13/18  04:00    














ICD10 Worksheet


Patient Problems: 


 Problems











Problem Status Onset


 


Acute appendicitis Acute  


 


Septic shock Acute  


 


Coronary artery bypass grafting Active  


 


Hematuria syndrome Active  


 


Nausea and vomiting Active  


 


Non-healing surgical wound Active  


 


Acute combined systolic and diastolic CHF, NYHA class 3 Acute  


 


Altered mental status Acute  


 


Chest pain Acute  


 


Chronic Disease Management/Transitional Care Program Acute  


 


Coronary arteriosclerosis Acute  


 


Elevated bilirubin Acute  


 


Elevated troponin Acute  


 


Gastrointestinal bleeding, lower Acute  


 


S/P CABG x 3 Acute  


 


Subtherapeutic international normalized ratio (INR) Acute

## 2018-11-13 NOTE — ASMTCMCOM
CM Note

 

CM Note                       

Notes:

83yo male admitted for Appendicitis, Septic shock. He has a Hx of CAD s/p CABG, VHD s/p 

TAVR, HTN, HLD, MI, Recurrent PE/DVT, EF 20-30%, Chronic respiratory fail, Prostate, lung and 

sarcoma CA. Lives with his wife in [place].

Therapies to eval for discharge needs. CM to follow.

 

Date Signed:  11/13/2018 09:16 AM

Electronically Signed By:Cyn King LCSW

## 2018-11-13 NOTE — ASMTLACE
LACE

 

Comorbidities - select        Answers:  Any tumor (including                  

all that apply                          lymphoma or leukemia)                 

                                        Chronic pulmonary disease             

                                        Congestive heart failure              

                                        Coronary Artery Disease               

                                        Opioid dependence                     

                                        / Chronic pain                        

                                        Previous myocardial                   

                                        infarction                            

                                        Other                         Notes:  Factor 5 Leiden; Hx of 


                                                                              PE/DVT; HTN

# of Emergency department     Answers:  1-2                                   

visits in the last 6                                                          

months                                                                        

Score: 15

 

Date Signed:  11/13/2018 08:07 AM

Electronically Signed By:Liliam Castanon

## 2018-11-13 NOTE — PDMN
Medical Necessity


Medical necessity: Pt meets IP criteria per MD and MCG M-160; est los > 2 mn 

for ongoing tx and management of sepsis with significant hypotension secondary 

to appendicitis with microperforation; requiring SDU care, further monitoring 

and workup.

## 2018-11-13 NOTE — HOSPPROG
Hospitalist Progress Note


Assessment/Plan: 





Acute appendicitis (Acute)


Septic shock (Acute)





83 yo M with MMI presenting with acute appendicitis with microperforation and 

severe sepsis





# Acute appendicitis: microperforation on CT (pers reviewed/interp), medical 

management per surgery consultation


   -cont ceftriaxone/flagyl


   -surgery following





# severe sepsis: improved, pt has baseline sbp in the 90's, lactate of 1.4.  





# factor V Leiden with recurrent PE/DVT: INR still elevated at 4.1


   -cont to hold coumadin and let INR drift down in the event he needs surgery


   -once it's clear he will not need operative intervention, will resume





# chronic systolic/diastolic heart failure: appears euvolemic currently


   -cont lower dose lasix, BB with hold parameters


   -cont digoxin, send level





# CAD: with hx of CABG.  Stable, CP free


   -cont statin, bb currently held as above





# chronic respiratory failure: at  his baseline o2 requirement





# prostate cancer: holding oral chemotherapeutic med for now with acute 

infection





# hx of lung cancer/sarcoma: s/p surgical intervention, currently stable





# hypothyroidism: cont levothyroxine





# dispo: cont inpt, transfer to pcu





Patient new to my care. Discussed with pulm and ICU care team on 

multidisciplinary rounds.


Subjective: Pt feels better.  Less abdominal pain.  No N/V/D.  No fevers.  

Tolerating clears.  No CP, SOB, orthopnea, or LE edema


Objective: 


 Vital Signs











Temp Pulse Resp BP Pulse Ox


 


 37.1 C   78   27 H  85/61 L  98 


 


 11/13/18 07:43  11/13/18 07:43  11/13/18 07:43  11/13/18 07:43  11/13/18 07:43








 Laboratory Results





 11/13/18 04:00 





 11/13/18 04:00 





 











 11/12/18 11/13/18 11/14/18





 05:59 05:59 05:59


 


Intake Total  272 


 


Output Total  300 125


 


Balance  -28 -125








 











PT  39.7 SEC (12.0-15.0)  H  11/13/18  04:00    


 


INR  4.15  (0.83-1.16)  H  11/13/18  04:00    














- Physical Exam


Constitutional: no apparent distress


Eyes: PERRL


Ears, Nose, Mouth, Throat: moist mucous membranes


Cardiovascular: regular rate and rhythym


Respiratory: no respiratory distress, reduced air movement, other (a few 

bibasilar crackles)


Gastrointestinal: normoactive bowel sounds, soft, non-tender abdomen


Skin: warm


Musculoskeletal: full muscle strength


Neurologic: AAOx3


Psychiatric: interacting appropriately





ICD10 Worksheet


Patient Problems: 


 Problems











Problem Status Onset


 


Acute appendicitis Acute  


 


Septic shock Acute  


 


Coronary artery bypass grafting Active  


 


Hematuria syndrome Active  


 


Nausea and vomiting Active  


 


Non-healing surgical wound Active  


 


Acute combined systolic and diastolic CHF, NYHA class 3 Acute  


 


Altered mental status Acute  


 


Chest pain Acute  


 


Chronic Disease Management/Transitional Care Program Acute  


 


Coronary arteriosclerosis Acute  


 


Elevated bilirubin Acute  


 


Elevated troponin Acute  


 


Gastrointestinal bleeding, lower Acute  


 


S/P CABG x 3 Acute  


 


Subtherapeutic international normalized ratio (INR) Acute

## 2018-11-13 NOTE — GCON
DATE OF CONSULTATION:  11/12/2018



REFERRING PHYSICIAN:  Didier Silver MD



REASON FOR CONSULTATION:  Appendicitis.



HISTORY OF PRESENT ILLNESS:  84-year-old male with multiple significant 
comorbidities including valvular heart disease, coronary artery disease status 
post CABG, congestive heart failure with an ejection fraction of 25%, critical 
aortic stenosis status post TAVR, pacemaker placement, factor 5 Leiden 
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 
prostate cancer managed by Dr. Laureano, and a prior history of lung cancer 
status post left lobectomy.  He presents to the emergency room today with a 1-
day history of crampy abdominal pain starting last evening with associated 
nausea and vomiting.  Because of progressive symptoms, he presented to the 
emergency room this afternoon at the discretion of his wife.  No prior history 
of similar complaints.  Notable chronic history of constipation.  No blood per 
rectum.  No voiding complaints.  At the present time, the patient reports that 
his pain is notably improved with immediate management.  CT imaging disclosed 
evidence of appendicitis with a possible small microperforation.  Surgeon has 
been requested for further recommendations.



PAST MEDICAL HISTORY:  Coronary artery disease, congestive heart failure, 
aortic stenosis, factor 5 deficiency, history of DVT and PE, metastatic 
prostate cancer, lung cancer.



PAST SURGICAL HISTORY:  CABG, TAVR, pacemaker placement, multiple inguinal 
hernia repairs x4, left lobectomy.



MEDICATIONS:  Flomax, Synthroid, Nitrostat, Xtandi, vitamin C, vitamin D, 
Crestor, Lanoxin, Protonix, Coreg, Zestril, Tums, vitamin D, iron sulfate, Lasix
, Coumadin, temazepam p.r.n., Imodium p.r.n.



ALLERGIES:  Daptomycin.



SOCIAL HISTORY:  No current alcohol or tobacco.  He is .  He is a 
retired  and prior .



PHYSICAL EXAMINATION:  VITAL SIGNS:  Temperature 37.4, blood pressure on 
admission 80/50, pulse 88, respirations 16.  Current vital signs 90/50, heart 
rate 70, respirations 14, temperature 36.8.  GENERAL:  Patient is alert, 
appropriate, comfortable at present time.  HEENT:  Anicteric.  NECK:  No 
cervical or supraclavicular lymphadenopathy.  No jugular venous distention.  
HEART:  Regular.  LUNGS:  Clear.  ABDOMEN:  Distended, soft.  Mild right lower 
quadrant tenderness without rebound or guarding.  Well-healed inguinal hernia 
incisions.  No abdominal erythema.  EXTREMITIES:  Without edema.  NEUROLOGIC:  
Exam alert and appropriate.  SKIN:  Without rashes.



DATA REVIEWED:  White count 16, hemoglobin 14, platelets of 120.  INR of 3.  
Lactate 1.4.  Sodium 136, potassium 4.2, chloride 94, CO2 is 33, BUN 19, 
creatinine 1, glucose 109, total bilirubin 1.9 with an unconjugated fraction 1.7
, AST 24, ALT 21, alkaline phosphatase 68, lipase 22.  



CT images were directly reviewed on PACS with the on-call radiologist:  
Appendicitis with a microperforation and distal appendiceal tip appendicolith.  
Extensive diverticulosis without diverticulitis.  Cholelithiasis without 
cholecystitis.  Small bilateral effusions, improved from 2017.  Chronic osseous 
bone metastases from a known diagnosis of prostate cancer.



IMPRESSIONS:  

1.  Early appendicitis with possible microperforation.

2.  Mild hypotension, leukocytosis consistent with above.

3.  History of hypercoagulability on Coumadin for factor 5 deficiency, history 
of deep vein thrombosis and PE.

4.  Metastatic prostate cancer.

5.  Valvular heart disease, status post Transcatheter Aortic Valve Replacement.

6.  Congestive heart failure, ejection fraction of 25%.



PLAN:  The patient is currently comfortable and hemodynamically improved with 
fluid resuscitation.  Rocephin and Flagyl have been given in the emergency room 
preemptively.  Will plan to proceed with hopeful nonoperative management of 
appendicitis in this high-risk octogenarian with multiple comorbidities.  Will 
withhold Coumadin in the interim time.  If the patient clinically decompensates 
consideration for a laparoscopic exploration will be entertained at that time.  
Findings, recommendations, and possible therapeutic outcomes were discussed in 
detail with the patient who is in complete agreement.





Job #:  [id number]

MTDD

## 2018-11-14 LAB
INR PPP: 3.09 (ref 0.83–1.16)
PLATELET # BLD: 84 10^3/UL (ref 150–400)
PROTHROMBIN TIME: 31.7 SEC (ref 12–15)

## 2018-11-14 RX ADMIN — ACETAMINOPHEN PRN MG: 325 TABLET ORAL at 03:21

## 2018-11-14 RX ADMIN — PANTOPRAZOLE SODIUM SCH MG: 40 TABLET, DELAYED RELEASE ORAL at 11:39

## 2018-11-14 RX ADMIN — ROSUVASTATIN CALCIUM SCH MG: 10 TABLET, FILM COATED ORAL at 11:39

## 2018-11-14 RX ADMIN — LEVOTHYROXINE SODIUM SCH MCG: 150 TABLET ORAL at 06:03

## 2018-11-14 RX ADMIN — FUROSEMIDE SCH: 40 TABLET ORAL at 11:41

## 2018-11-14 RX ADMIN — OXYCODONE HYDROCHLORIDE AND ACETAMINOPHEN SCH MG: 500 TABLET ORAL at 11:39

## 2018-11-14 RX ADMIN — IRON SUPPLEMENT SCH MG: 325 TABLET ORAL at 11:39

## 2018-11-14 RX ADMIN — FUROSEMIDE SCH MG: 40 TABLET ORAL at 11:40

## 2018-11-14 NOTE — HOSPPROG
Hospitalist Progress Note


Assessment/Plan: 





Acute appendicitis (Acute)





83 yo M with MMI presenting with acute appendicitis with microperforation and 

severe sepsis





# Acute appendicitis: microperforation on CT (pers reviewed/interp), medical 

management per surgery consultation


   -cont ceftriaxone/flagyl


   -surgery following





# severe sepsis: resolved, pt has baseline sbp in the 90's, lactate was 1.4.  





# dyspnea: send bnp, repeat cxr, increase lasix





# chronic systolic/diastolic heart failure: appears slightly volume up today


   -increase lasix back to home dose, 40 mg daily


   -cont digoxin, level pending





# factor V Leiden with recurrent PE/DVT: INR trending back down, still 3


   -cont to hold coumadin and let INR drift down in the event he needs surgery


   -once it's clear he will not need operative intervention, will resume





# thrombocytopenia - plts 120 --> 84, no e/o bleeding, possibly due to infection


   -cont to monitor, holding coumadin





# CAD: with hx of CABG.  Stable, CP free


   -cont statin


   -BB held today for hypotension (BP 80's/30's)





# chronic respiratory failure: at baseline o2 requirement of 2 LPM





# prostate cancer: resume oral med.  discussed with onc, xtandi does not cause 

BM suppression





# hx of lung cancer/sarcoma: s/p surgical intervention, currently stable





# hypothyroidism: cont levothyroxine





# dispo: cont inpt, transfer to pcu





Subjective: Had some nausea last night, feels SOB this am, endorses orthopnea.  

Denies CP.  Still some abdominal discomfort, no n/v/d.  No fevers/chills.


Objective: 


 Vital Signs











Temp Pulse Resp BP Pulse Ox


 


 36.9 C   75   19   96/54 L  100 


 


 11/14/18 08:00  11/14/18 08:00  11/14/18 08:00  11/14/18 08:00  11/14/18 08:00








 Laboratory Results





 11/14/18 06:00 





 11/13/18 04:00 





 











 11/13/18 11/14/18 11/15/18





 05:59 05:59 05:59


 


Intake Total 272 1720 


 


Output Total 300 500 


 


Balance -28 1220 








 











PT  31.7 SEC (12.0-15.0)  H  11/14/18  06:00    


 


INR  3.09  (0.83-1.16)  H  11/14/18  06:00    














- Physical Exam


Constitutional: no apparent distress


Eyes: PERRL


Ears, Nose, Mouth, Throat: moist mucous membranes


Cardiovascular: regular rate and rhythym


Respiratory: no respiratory distress, other (diminised LLL (s/p lobectomy), 

slight crackles RLL)


Gastrointestinal: normoactive bowel sounds, other (soft, mild distention, +RLQ 

TTP, no r/r/g, )


Skin: warm


Musculoskeletal: full muscle strength


Neurologic: AAOx3


Psychiatric: interacting appropriately





ICD10 Worksheet


Patient Problems: 


 Problems











Problem Status Onset


 


Acute appendicitis Acute  


 


Septic shock Acute  


 


Coronary artery bypass grafting Active  


 


Hematuria syndrome Active  


 


Nausea and vomiting Active  


 


Non-healing surgical wound Active  


 


Acute combined systolic and diastolic CHF, NYHA class 3 Acute  


 


Altered mental status Acute  


 


Chest pain Acute  


 


Chronic Disease Management/Transitional Care Program Acute  


 


Coronary arteriosclerosis Acute  


 


Elevated bilirubin Acute  


 


Elevated troponin Acute  


 


Gastrointestinal bleeding, lower Acute  


 


S/P CABG x 3 Acute  


 


Subtherapeutic international normalized ratio (INR) Acute

## 2018-11-14 NOTE — SOAPPROG
SOAP Progress Note


Assessment/Plan: 


Assessment/Plan:


Feels slightly worse today- had one episode of nausea last night and now mild 

LLQ pain. WBC decreased to 5, INR 3. Systolic BPs in 90s. Will continue with IV 

antibiotics today. Consider repeat CT abdomen if he continues to clinically 

worsen. Encouraged continued PO hydration. Patient seen and evaluated with Dr. Solomon. 








11/14/18 17:10





Subjective: 


Patient reports having nausea last night after having chicken broth- this was 

relieved with antiemetics. Tolerating water well. His RLQ pain has largely 

resolved, but now has mild LLQ pain. LLQ improves with passing gas. Last bowel 

movement normal yesterday, no hematochezia. No urinary complaints. Having 

flatus. Also complains of mild shortness of breath today, no chest pain.





Objective: 





 Vital Signs











Temp Pulse Resp BP Pulse Ox


 


 36.9 C   75   19   96/54 L  100 


 


 11/14/18 08:00  11/14/18 08:00  11/14/18 08:00  11/14/18 08:00  11/14/18 08:00








 Laboratory Results





 11/14/18 06:00 





 11/13/18 04:00 





 











 11/13/18 11/14/18 11/15/18





 05:59 05:59 05:59


 


Intake Total 272 1720 


 


Output Total 300 500 


 


Balance -28 1220 








 











PT  31.7 SEC (12.0-15.0)  H  11/14/18  06:00    


 


INR  3.09  (0.83-1.16)  H  11/14/18  06:00    











Physical Exam:


   Gen: A&O x3, afebrile


   HEENT: anicteric


   Skin: normal


   Heart: RRR


   Lungs: CTA right, left side decreased apical lung sounds and faint basilar 

crackles


   Abdomen: soft, mild distension, minimal LLQ tenderness, no RLQ tenderness


   Extremities: no edema





ICD10 Worksheet


Patient Problems: 


 Problems











Problem Status Onset


 


Acute appendicitis Acute  


 


Septic shock Acute  


 


Coronary artery bypass grafting Active  


 


Hematuria syndrome Active  


 


Nausea and vomiting Active  


 


Non-healing surgical wound Active  


 


Acute combined systolic and diastolic CHF, NYHA class 3 Acute  


 


Altered mental status Acute  


 


Chest pain Acute  


 


Chronic Disease Management/Transitional Care Program Acute  


 


Coronary arteriosclerosis Acute  


 


Elevated bilirubin Acute  


 


Elevated troponin Acute  


 


Gastrointestinal bleeding, lower Acute  


 


S/P CABG x 3 Acute  


 


Subtherapeutic international normalized ratio (INR) Acute

## 2018-11-14 NOTE — ASMTCMCOM
CM Note

 

CM Note                       

Notes:

83yo male admitted for Appendicitis, Septic shock, to be managed medically per surgeon. He has a Hx 


of CAD s/p CABG, VHD s/p TAVR, HTN, HLD, MI, Recurrent PE/DVT, EF 20-30%, Chronic respiratory 

fail, Prostate, lung and sarcoma CA. 



Lives with his wife in [place]. Therapies are recommending home health care. Pt states he has used 

[facility] in the past and would be happy to utilize them again. Referral sent.  CM to 


follow.

D/C Plan: Home with home health



 

  

 

Date Signed:  11/14/2018 11:57 AM

Electronically Signed By:Rebekah Martinez

## 2018-11-15 LAB
INR PPP: 2.08 (ref 0.83–1.16)
PLATELET # BLD: 94 10^3/UL (ref 150–400)
PROTHROMBIN TIME: 23.4 SEC (ref 12–15)

## 2018-11-15 RX ADMIN — ROSUVASTATIN CALCIUM SCH MG: 10 TABLET, FILM COATED ORAL at 09:05

## 2018-11-15 RX ADMIN — OXYCODONE HYDROCHLORIDE AND ACETAMINOPHEN SCH MG: 500 TABLET ORAL at 09:06

## 2018-11-15 RX ADMIN — ACETAMINOPHEN PRN MG: 325 TABLET ORAL at 03:41

## 2018-11-15 RX ADMIN — POLYETHYLENE GLYCOL 3350 SCH GM: 17 POWDER, FOR SOLUTION ORAL at 21:19

## 2018-11-15 RX ADMIN — LEVOTHYROXINE SODIUM SCH MCG: 150 TABLET ORAL at 06:17

## 2018-11-15 RX ADMIN — PANTOPRAZOLE SODIUM SCH MG: 40 TABLET, DELAYED RELEASE ORAL at 09:07

## 2018-11-15 RX ADMIN — IRON SUPPLEMENT SCH MG: 325 TABLET ORAL at 09:05

## 2018-11-15 RX ADMIN — FUROSEMIDE SCH MG: 40 TABLET ORAL at 09:03

## 2018-11-15 NOTE — SOAPPROG
SOAP Progress Note


Assessment/Plan: 


Assessment/Plan:


Doing well. No further nausea or LLQ pain. RLQ improving. WBC decreased to 4, 

INR 2.  He will restart his coumadin tomorrow morning to keep him in 

therapeutic range. Will continue with IV antibiotics and plan for PO antibiotic 

regimen upon discharge. Patient seen and evaluated with Dr. Solomon. 








11/15/18 17:43





Subjective: 


Feeling better today. No overnight concerns. No further episodes of nausea. 

Tolerating regular diet well. Minimal RLQ pain, continually improving. No 

further LLQ pain. Continues to feel short of breath. No other concerns.


Objective: 





 Vital Signs











Temp Pulse Resp BP Pulse Ox


 


 36.6 C   70   26 H  117/65   95 


 


 11/15/18 07:54  11/15/18 03:27  11/15/18 07:54  11/15/18 03:27  11/15/18 07:54








 Laboratory Results





 11/15/18 03:29 





 11/13/18 04:00 





 











 11/14/18 11/15/18 11/16/18





 05:59 05:59 05:59


 


Intake Total 1720 880 


 


Output Total 500 1050 400


 


Balance 1220 -170 -400








 











PT  23.4 SEC (12.0-15.0)  H  11/15/18  03:29    


 


INR  2.08  (0.83-1.16)  H  11/15/18  03:29    











Physical Exam:


   General: A&O x3, sitting in recliner, afebrile


   HEENT: anicteric


   Skin: normal


   Heart: regular rate


   Lungs: bibasilar crackles, good air entry


   Abdomen: soft, mild distension, mild RLQ tenderness








ICD10 Worksheet


Patient Problems: 


 Problems











Problem Status Onset


 


Acute appendicitis Acute  


 


Septic shock Acute  


 


Coronary artery bypass grafting Active  


 


Hematuria syndrome Active  


 


Nausea and vomiting Active  


 


Non-healing surgical wound Active  


 


Acute combined systolic and diastolic CHF, NYHA class 3 Acute  


 


Altered mental status Acute  


 


Chest pain Acute  


 


Chronic Disease Management/Transitional Care Program Acute  


 


Coronary arteriosclerosis Acute  


 


Elevated bilirubin Acute  


 


Elevated troponin Acute  


 


Gastrointestinal bleeding, lower Acute  


 


S/P CABG x 3 Acute  


 


Subtherapeutic international normalized ratio (INR) Acute

## 2018-11-15 NOTE — ASMTCMCOM
CM Note

 

CM Note                       

Notes:

11/15/2018 Case Management Note



Pt open with [facility].  Faxed updates.



Case Management d/c poc:  [facility]



Case Management to follow.

 

Date Signed:  11/15/2018 03:08 PM

Electronically Signed By:Juana Beauchamp RN

## 2018-11-15 NOTE — HOSPPROG
Hospitalist Progress Note


Assessment/Plan: 





DIAGNOSES: 


83 yo M with MMI presenting with acute appendicitis with microperforation and 

severe sepsis





# Acute appendicitis: microperforation on CT (pers reviewed/interp), medical 

management per surgery consultation


   -cont ceftriaxone/flagyl


   -hopefully can manage non operatively, surgery following


# severe sepsis: resolved, pt has baseline sbp in the 90's, lactate was 1.4.  


# dyspnea: send bnp, repeat cxr, increase lasix


# chronic systolic/diastolic heart failure: appears slightly volume up today


   -increase lasix back to home dose, 40 mg daily


   -cont digoxin, level pending


# factor V Leiden with recurrent PE/DVT: INR trending back down, still 3


   -resume anticoagulation today and follow INR closely (Coumadin was ordered 

by the surgery team)


# thrombocytopenia - plts 120 --> 84, no e/o bleeding, possibly due to infection


   -cont to monitor, holding coumadin


# CAD: with hx of CABG.  Stable, CP free


   -cont statin, beta-blocker


# chronic respiratory failure: at baseline o2 requirement of 2 LPM though lives 

at 7000 ft


# prostate cancer: resume oral med.  discussed with onc, xtandi does not cause 

BM suppression


# hx of lung cancer/sarcoma: s/p surgical intervention, currently stable








SUBJECTIVE:


States he still feels somewhat dyspneic today


Little abdominal pain, but poor appetite no bowel movement for 2 days





OBJECTIVE


Vitals reviewed:  Intermittently tachypneic, using 1 L of oxygen which he uses 

at home though his home was at 7000 feet


Cardiac Monitor, my review:  Sinus





Exam:


alert oriented relaxed but looks tired


skin warm dry color ok


resps not labored


lungs diminished but otherwise clear BSs


heart regular


abd soft nondistended nontender, bowel sounds present


limbs warm, 1+ bilateral pitting edema of the pretibial and calf areas 





iv site ok





Imaging:


I reviewed the images from yesterday's chest x-ray.  There is no free air under 

the abdomen.  There is still elevation of the left hemidiaphragm.  There is 

some atelectasis, hard to determine whether there is actually pulmonary edema 

or not from the study.





Laboratory data:


Stable CBC with rising hemoglobin and platelets


INR 2.0 today








Objective: 


 Vital Signs











Temp Pulse Resp BP Pulse Ox


 


 36.6 C   58 L  20   119/60   98 


 


 11/15/18 15:48  11/15/18 15:48  11/15/18 15:48  11/15/18 15:48  11/15/18 15:48








 Laboratory Results





 11/15/18 03:29 





 11/13/18 04:00 





 











 11/14/18 11/15/18 11/16/18





 06:59 06:59 06:59


 


Intake Total 1720 880 700


 


Output Total 500 1050 865


 


Balance 1220 -170 -165








 











PT  23.4 SEC (12.0-15.0)  H  11/15/18  03:29    


 


INR  2.08  (0.83-1.16)  H  11/15/18  03:29    














- Time Spent With Patient


Time Spent with Patient: greater than 35 minutes


Time Spent with Patient: Greater than 35 minutes spent on this patients care, 

greater than 50% of time spent counseling, educating, and coordinating care 

regarding the above mentioned plan.





ICD10 Worksheet


Patient Problems: 


 Problems











Problem Status Onset


 


Acute appendicitis Acute  


 


Septic shock Acute  


 


Coronary artery bypass grafting Active  


 


Hematuria syndrome Active  


 


Nausea and vomiting Active  


 


Non-healing surgical wound Active  


 


Acute combined systolic and diastolic CHF, NYHA class 3 Acute  


 


Altered mental status Acute  


 


Chest pain Acute  


 


Chronic Disease Management/Transitional Care Program Acute  


 


Coronary arteriosclerosis Acute  


 


Elevated bilirubin Acute  


 


Elevated troponin Acute  


 


Gastrointestinal bleeding, lower Acute  


 


S/P CABG x 3 Acute  


 


Subtherapeutic international normalized ratio (INR) Acute

## 2018-11-16 LAB
INR PPP: 1.65 (ref 0.83–1.16)
PROTHROMBIN TIME: 19.6 SEC (ref 12–15)

## 2018-11-16 RX ADMIN — LEVOTHYROXINE SODIUM SCH MCG: 150 TABLET ORAL at 05:10

## 2018-11-16 RX ADMIN — TEMAZEPAM PRN MG: 15 CAPSULE ORAL at 22:31

## 2018-11-16 RX ADMIN — ROSUVASTATIN CALCIUM SCH MG: 10 TABLET, FILM COATED ORAL at 09:43

## 2018-11-16 RX ADMIN — OXYCODONE HYDROCHLORIDE AND ACETAMINOPHEN SCH MG: 500 TABLET ORAL at 09:43

## 2018-11-16 RX ADMIN — WARFARIN SODIUM SCH MG: 1 TABLET ORAL at 09:44

## 2018-11-16 RX ADMIN — CARVEDILOL SCH MG: 3.12 TABLET, FILM COATED ORAL at 17:45

## 2018-11-16 RX ADMIN — WARFARIN SODIUM SCH MG: 5 TABLET ORAL at 09:44

## 2018-11-16 RX ADMIN — POLYETHYLENE GLYCOL 3350 SCH GM: 17 POWDER, FOR SOLUTION ORAL at 09:46

## 2018-11-16 RX ADMIN — PANTOPRAZOLE SODIUM SCH MG: 40 TABLET, DELAYED RELEASE ORAL at 09:46

## 2018-11-16 RX ADMIN — AMOXICILLIN AND CLAVULANATE POTASSIUM SCH MG: 875; 125 TABLET, FILM COATED ORAL at 20:18

## 2018-11-16 RX ADMIN — FUROSEMIDE SCH MG: 40 TABLET ORAL at 09:46

## 2018-11-16 RX ADMIN — DOCUSATE SODIUM AND SENNOSIDES SCH TAB: 50; 8.6 TABLET ORAL at 17:45

## 2018-11-16 RX ADMIN — IRON SUPPLEMENT SCH MG: 325 TABLET ORAL at 17:45

## 2018-11-16 RX ADMIN — ACETAMINOPHEN PRN MG: 325 TABLET ORAL at 03:59

## 2018-11-16 NOTE — GHP
DATE OF ADMISSION:  11/12/2018



Patient of Dr. Solomon, surgeon.



PRIMARY CARE PHYSICIAN:  Dr. Prado



CARDIOLOGIST:  Dr. Rushing



CHIEF COMPLAINT:  Shortness of breath.



HPI:  This is a very pleasant 84-year-old gentleman, who has been followed in our practice by Dr. Chaz connell.  He has a cardiac history of atrial fibrillation with pacemaker placement.  He has been on chr
onic warfarin for Factor 5 Leiden deficiency and recurrent DVTs.  He has been well maintained.  He ha
s a history of valvular heart disease, status post TAVR aortic valve replacement approximately a year
 ago.  His last echo in June of this year showed EF of 51% with normal functioning valves, with mild 
pulmonary hypertension.  There was moderate tricuspid insufficiency, moderate mitral insufficiency.  
Patient was recently admitted for acute appendicitis with sepsis.  He has been treated with conservat
almaz medical management and doing well.  He is complaining of some increasing shortness of breath.  In
 looking at his I's and O's, his weight has increased approximately 2 kg since admission.  His home L
asix and Coreg were initially discontinued.  His BNP was 5000.  Otherwise, his blood pressure has bee
n stable with a stable heart rate.  He is seen today resting comfortably.  He is up in the halls and 
active.  He is being switched from IV to oral antibiotics.  He apparently is starting to eat and feel
 better at this time.



PAST MEDICAL HISTORY:  

1.  Status post CABG.

2.  Status post to TAVR 1 year ago. 

3.  History of factor 5 Leiden deficiency on chronic Coumadin. 

4.  History of chronic systolic, diastolic heart failure. 

5.  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  He has a daptomycin allergy.



MEDICATIONS:  See reconciliation form.



REVIEW OF SYSTEMS:  At this point, he has no active GI or  bleeding.  He has no angina.  No syncope
.  He complains of shortness of breath.  See HPI.  He denies any ongoing fevers at this time.



EXAM:  VITALS:  Blood pressure is 125/80.  His heart rate is in the 60s and 70s.  GENERAL:  He is an 
elderly male seen sitting in a chair.  Alert and oriented.  MOUTH:  Oropharynx is moist.  NECK:  Supp
le.  LUNGS:  Were somewhat dull in the bases.  CARDIOVASCULAR:  He had a soft systolic murmur with an
 irregular pulse.  No peripheral edema.  ABDOMEN:  Soft, nontender.



ASSESSMENT:  

1.  Shortness of breath.  I suspect multifactorial.  From a cardiac standpoint, he probably has some 
volume overload issues.  I do not think these appear extreme.  He has recently been started back on h
is 40 p.o. Lasix.  We will restart his Coreg at 3.125 as well.  If need be, IV Lasix can be given, bu
t we will see the response to this medicine change at this time.  His BNP is 5000.  Echocardiogram wi
ll be ordered to compare with the echo done in June 2016, that showed an EF of 51% with moderate mitr
al and tricuspid insufficiency, mild pulmonary hypertension, and normal functioning TAVR, aortic valv
e replacement.  

2.  Factor 5 Leiden.  Patient will continue Coumadin. 

3.  History of CAD.  Patient without ongoing anginal symptoms at this time.  The patient has a pacema
ker with a Bi-V pacemaker placement with normal function on last evaluation in September 2018.



PLAN:  

1.  Lasix 40 mg p.o. daily, adjust as needed. 

2.  Restart Coreg 3.125 mg p.o. daily. 

3.  Obtain echocardiogram.  

4.  We will continue to follow throughout hospitalization.





Job #:  [id number]

## 2018-11-16 NOTE — SOAPPROG
SOAP Progress Note


Assessment/Plan: 


Assessment/Plan:


Doing well from a surgical standpoint. No abdominal pain today. Will transition 

IV antibiotics to PO augmentin. INR >2, restarted Coumadin this morning. 

Encouraged continued oral fluid intake. Patient seen and evaluated with Dr. Solomon. 








11/16/18 09:12





Subjective: 


Doing well. Feels bloated and constipated today, otherwise abdominal complaints 

greatly improved. No abdominal pain. No nausea. Having flatus. States he is 

taking stool softener and prune juice to help constipation. Tolerating regular 

diet and fluids. No fever. Continues to complain of shortness of breath.


Objective: 





 Vital Signs











Temp Pulse Resp BP Pulse Ox


 


 36.7 C   66   18   125/70 H  97 


 


 11/16/18 08:00  11/16/18 08:00  11/16/18 08:00  11/16/18 08:00  11/16/18 08:00








 Laboratory Results





 11/15/18 03:29 





 11/13/18 04:00 





 











 11/15/18 11/16/18 11/17/18





 05:59 05:59 05:59


 


Intake Total 880 1000 


 


Output Total 1050 1165 


 


Balance -170 -165 








 











PT  19.6 SEC (12.0-15.0)  H  11/16/18  04:18    


 


INR  1.65  (0.83-1.16)  H  11/16/18  04:18    











Physical exam:


   Gen: A&O x3, appears comfortable, afebrile


   HEENT: anicteric


   Skin: normal


   Abdomen: soft, distended, nontender. No rebound or guarding.


   Extremities: no edema bilateral





ICD10 Worksheet


Patient Problems: 


 Problems











Problem Status Onset


 


Acute appendicitis Acute  


 


Septic shock Acute  


 


Coronary artery bypass grafting Active  


 


Hematuria syndrome Active  


 


Nausea and vomiting Active  


 


Non-healing surgical wound Active  


 


Acute combined systolic and diastolic CHF, NYHA class 3 Acute  


 


Altered mental status Acute  


 


Chest pain Acute  


 


Chronic Disease Management/Transitional Care Program Acute  


 


Coronary arteriosclerosis Acute  


 


Elevated bilirubin Acute  


 


Elevated troponin Acute  


 


Gastrointestinal bleeding, lower Acute  


 


S/P CABG x 3 Acute  


 


Subtherapeutic international normalized ratio (INR) Acute

## 2018-11-16 NOTE — ECHO
[email or web address]





[address] [address] 

Main: [phone] 



Fax: 



Transthoracic Echocardiogram 

Name:             FAINA EDWARDS                      MR#:

[id number]

Study Date:       2018                            Study Time:

12:34 PM

YOB: 1934                            Age:

84 year(s)

Height:             ( )                                 Weight:

( )

BSA:                                                    Gender:

Male

Examination:      Echo                                  Indication:

Shortness of breath, Hx of TAVR, Pacemaker

Image Quality:                                          Contrast: 

Requested by:     Everardo Montgomery                          BP:

102 mmHg/58 mmHg

Heart Rate:                                             Rhythm:

Pacemaker rhythm

Indication:       Shortness of breath, Hx of TAVR, Pacemaker 



Procedure Staff 

Ultrasound Technician:   Sancho Garcia RDCS 

Reading Physician:       Everardo Montgomery MD 

Requesting Provider: 



Conclusions:           Mild concentric LV hypertrophy.  

Low normal left ventricular systolic function.  

The ejection fraction is visually estimated to be 50 %.  

There is a pacemaker lead noted in the right ventricle.  

The left atrium is moderately to severely dilated.  

The right atrium is moderately dilated.  

The mean MV gradient is 3 mmhg..  

There is a Core Valve in the aortic position, There is no impingement

of the mitral valve and normal

gradients. There is mild AI. The peak Ao Vmax is 1.4 m/s.  

The tricuspid valve appears normal.  

Right ventricular systolic pressure measures 46mmHg.  

Compared to echo of 2017, today's echo is unchanged. 



Measurements: 

Chambers                     Valvular Assessment AV/MV

Valvular Assessment TV/PV



Normal                                    Normal

Normal

Name         Value     Range              Name         Value Range

Name           Value Range

IVSd (2D):   1.8 cm (0.6 cm-1.1               AV Vmax:     1.42 m/s (1

m/s-1.7        TR Vmax:       3.22 mm/s ( - )



cm)                                   m/s)             TR PGmax:

41 mmHg ( - )

LVDd (2D):   4.0 cm    (4.2 cm-5.9            AV maxP mmHg ( -

)           syst. PAP: 46 mmHg  ( - )



cm)                AV meanP mmHg ( - )           PV Vmax:

0.62 m/s (0.6 m/s-0.9

LVDs (2D):   3.0 cm    (2.1 cm-4              LVOT Vmax:   0.76 m/s

(0.7 m/s-1.1                          m/s)



cm)                                   m/s)             PV PGmax:

2  mmHg ( - )

LVPWd (2D):  1.4 cm    (0.6 cm-1              MERI (Vmax):  1.7 cm2 ( -

)



cm)                MERI (VTI):   1.9 cm ( - )  

LVOTd        2.0 cm    2.0 cm mm              MV E Vmax:   1.27 m/s (

- )

LVEF (2D):   51        (>=54 %)           MV A Vmax:   0.55 m/s ( - )



Visual EF:   50 %                             MV E/A:      2.31  ( - )





MV meanP mmHg ( - )  

MVA (Vmax):  1.6 m/s ( - )  



Patient: FAINA EDWARDS                     MRN: [id number]

Study Date: 2018   Page 1 of 2

12:34 PM 









Continued Measurements: 

Chambers                     Valvular Assessment AV/MV

Valvular Assessment TV/PV



Name                      Value           Name

Value     Name                     Value

LADs Lon.9 cm               MV E' Septal:

0.02  m/s   CVP (est.):             5 mmHg

LA Area:                 20.0 cm2         MV E/E' Septal:

62.00

LA Volume:               60 ml            MV E/E' Lateral:

43.50



MV VTI:                  28.90 cm    



Findings:              Left Ventricle: 

Mild concentric LV hypertrophy. Low normal left ventricular systolic

function. The ejection fraction is

visually estimated to be 50 %.  

Right Ventricle: 

Mildly dilated right ventricle. Normal RV function. There is a

pacemaker lead noted in the right

ventricle.  

Left Atrium: 

The left atrium is moderately to severely dilated.  

Right Atrium: 

The right atrium is moderately dilated.  

Mitral Valve: 

Mild mitral valve leaflet calcification is present. No mitral stenosis

is present. The mean MV gradient

is 3 mmhg..  

Aortic Valve: 

There is a Core Valve in the aortic position, There is no impingement

of the mitral valve and normal

gradients. There is mild AI. The peak Ao Vmax is 1.4 m/s.  

Tricuspid Valve: 

The tricuspid valve appears normal. Mild to moderate tricuspid valve

regurgitation. Right ventricular

systolic pressure measures 46mmHg.  

Pulmonic Valve: 

The pulmonic valve is normal in appearance and function. Trivial to

mild pulmonic valve

regurgitation.  

Aorta: 

The aorta is normal.  

Pericardium: 

No pericardial effusion.  

Exam Comments: 

Compared to the previous exam of 18 there has been no significant

change..







Electronically signed by Everardo Montgomery MD on 2018 at 03:41 PM 

(No Signature Object) 



Patient: FAINA EDWARDS                     MRN: [id number]

Study Date: 2018   Page 2 of 2

12:34 PM 







[id number]

## 2018-11-16 NOTE — HOSPPROG
Hospitalist Progress Note


Assessment/Plan: 





DIAGNOSES: 


# Acute appendicitis: microperforation on CT (pers reviewed/interp)


   -cont ceftriaxone/flagyl


   -hopefully can continue to manage non operatively, this is going well so 

far.  surgery following


# severe sepsis due to above: resolved 


# acute hypoxemic respiratory failure with chronic hypoxemic respiratory 

failure and home O2 use:  Multifactorial with underlying heart disease, sepsis, 

atelectasis, abdominal distension and pain etc


   -Coreg restarted at this time, was initially held due to sepsis with 

hypotension


   -continue Lasix diuresis


   -echocardiogram ordered and result pending


# chronic systolic/diastolic heart failure, history of CABG: appears slightly 

volume up today


   -continue Lasix, beta-blocker resumed


   -cont digoxin, level in range 0.8


# factor V Leiden with recurrent PE/DVT: INR trending back down, still 3


   -continue anticoagulation and follow INR closely (Coumadin ordered by the 

surgery team)


# thrombocytopenia - likely due to infection, improving


   -will recheck tomorrow for stability


# chronic respiratory failure: at baseline o2 requirement of 2 LPM though lives 

at 7000 ft


# prostate cancer: resume oral med.  discussed with onc, xtandi does not cause 

BM suppression


# hx of lung cancer/sarcoma: s/p surgical intervention, currently stable





Reviewed today in detail with Dr. Everardo Montgomery cardiology





SUBJECTIVE:


Still some dyspnea today not change from yesterday


No abdominal pain eating better still no bowel movement for last 2 days.


No fever symptoms nausea vomiting or bloating.


No angina or palpitations





OBJECTIVE


Vitals reviewed:  Intermittently tachypneic, using 1 L of oxygen which he uses 

at home though his home was at 7000 feet


Cardiac Monitor, my review:  Sinus





Exam:


alert oriented relaxed but looks tired


skin warm dry color ok


resps not labored


lungs diminished but otherwise clear BSs


heart regular


abd soft nondistended nontender, bowel sounds present


limbs warm, edema of legs is improved since yesterday





iv site ok





Laboratory data:


INR 1.65





Objective: 


 Vital Signs











Temp Pulse Resp BP Pulse Ox


 


 36.9 C   78   19   102/58 L  96 


 


 11/16/18 11:51  11/16/18 11:51  11/16/18 11:51  11/16/18 11:51  11/16/18 11:51








 Laboratory Results





 11/15/18 03:29 





 11/13/18 04:00 





 











 11/15/18 11/16/18 11/17/18





 06:59 06:59 06:59


 


Intake Total 880 1000 


 


Output Total 1050 1165 


 


Balance -170 -165 








 











PT  19.6 SEC (12.0-15.0)  H  11/16/18  04:18    


 


INR  1.65  (0.83-1.16)  H  11/16/18  04:18    














- Time Spent With Patient


Time Spent with Patient: greater than 35 minutes


Time Spent with Patient: Greater than 35 minutes spent on this patients care, 

greater than 50% of time spent counseling, educating, and coordinating care 

regarding the above mentioned plan.





ICD10 Worksheet


Patient Problems: 


 Problems











Problem Status Onset


 


Acute appendicitis Acute  


 


Septic shock Acute  


 


Coronary artery bypass grafting Active  


 


Hematuria syndrome Active  


 


Nausea and vomiting Active  


 


Non-healing surgical wound Active  


 


Acute combined systolic and diastolic CHF, NYHA class 3 Acute  


 


Altered mental status Acute  


 


Chest pain Acute  


 


Chronic Disease Management/Transitional Care Program Acute  


 


Coronary arteriosclerosis Acute  


 


Elevated bilirubin Acute  


 


Elevated troponin Acute  


 


Gastrointestinal bleeding, lower Acute  


 


S/P CABG x 3 Acute  


 


Subtherapeutic international normalized ratio (INR) Acute

## 2018-11-17 LAB — PLATELET # BLD: 113 10^3/UL (ref 150–400)

## 2018-11-17 RX ADMIN — DOCUSATE SODIUM AND SENNOSIDES SCH TAB: 50; 8.6 TABLET ORAL at 20:48

## 2018-11-17 RX ADMIN — CARVEDILOL SCH MG: 3.12 TABLET, FILM COATED ORAL at 19:11

## 2018-11-17 RX ADMIN — PANTOPRAZOLE SODIUM SCH MG: 40 TABLET, DELAYED RELEASE ORAL at 12:13

## 2018-11-17 RX ADMIN — DOCUSATE SODIUM AND SENNOSIDES SCH TAB: 50; 8.6 TABLET ORAL at 12:12

## 2018-11-17 RX ADMIN — ROSUVASTATIN CALCIUM SCH MG: 10 TABLET, FILM COATED ORAL at 12:13

## 2018-11-17 RX ADMIN — POLYETHYLENE GLYCOL 3350 SCH: 17 POWDER, FOR SOLUTION ORAL at 12:14

## 2018-11-17 RX ADMIN — AMOXICILLIN AND CLAVULANATE POTASSIUM SCH MG: 875; 125 TABLET, FILM COATED ORAL at 20:48

## 2018-11-17 RX ADMIN — IRON SUPPLEMENT SCH MG: 325 TABLET ORAL at 12:13

## 2018-11-17 RX ADMIN — LEVOTHYROXINE SODIUM SCH MCG: 150 TABLET ORAL at 06:11

## 2018-11-17 RX ADMIN — TAMSULOSIN HYDROCHLORIDE SCH MG: 0.4 CAPSULE ORAL at 15:00

## 2018-11-17 RX ADMIN — OXYCODONE HYDROCHLORIDE AND ACETAMINOPHEN SCH MG: 500 TABLET ORAL at 12:13

## 2018-11-17 RX ADMIN — AMOXICILLIN AND CLAVULANATE POTASSIUM SCH MG: 875; 125 TABLET, FILM COATED ORAL at 12:23

## 2018-11-17 RX ADMIN — CARVEDILOL SCH MG: 3.12 TABLET, FILM COATED ORAL at 12:13

## 2018-11-17 RX ADMIN — FUROSEMIDE SCH MG: 40 TABLET ORAL at 12:12

## 2018-11-17 RX ADMIN — POLYETHYLENE GLYCOL 3350 SCH GM: 17 POWDER, FOR SOLUTION ORAL at 12:13

## 2018-11-17 NOTE — SOAPPROG
SOAP Progress Note


Assessment/Plan: 


Assessment:


1.  Coronary disease status post bypass grafting


2. Status post transcutaneous aortic valve replacement


3. Biventricular pacer


4. Acute appendicitis


5.  Mild acute on chronic heart failure with preserved LV systolic function


Impression:  Feeling significantly better back on beta-blocker and diuretic 

therapy.  No significant abdominal pain.  Continue current medical therapy with 

clinical follow-up  











11/17/18 13:36





Subjective: 


Sitting in chair.  No chest pain, shortness of breath.  No PND orthopnea.  Had 

a bowel movement.  Overall improved.  No fever or chills.





Objective: 





Medications











Generic Name Dose Route Start Last Admin





  Trade Name Freq  PRN Reason Stop Dose Admin


 


Carvedilol  3.125 mg  11/16/18 18:00  11/17/18 12:13





  Coreg  PO  05/15/19 17:59  3.125 mg





  BIDMEAL [facility]   


 


Ferrous Sulfate  325 mg  11/13/18 09:00  11/17/18 12:13





  Ferrous Sulfate  PO  05/12/19 08:59  325 mg





  DAILY [facility]   


 


Furosemide  40 mg  11/14/18 09:57  11/17/18 12:12





  Lasix  PO  05/12/19 11:29  40 mg





  DAILY [facility]   


 


Levothyroxine Sodium  150 mcg  11/13/18 06:00  11/17/18 06:11





  Synthroid  PO  05/12/19 05:59  150 mcg





  DAILY06 [facility]   


 


Rosuvastatin Calcium  10 mg  11/13/18 09:00  11/17/18 12:13





  Crestor  PO  05/12/19 08:59  10 mg





  DAILY [facility]   


 


Warfarin Sodium  5 mg  11/16/18 08:00  11/16/18 09:44





  Coumadin  PO  05/15/19 07:59  5 mg





  SuMoTuThFr@0800 [facility]   











 Vital Signs











Temp Pulse Resp BP Pulse Ox


 


 36.7 C   83   18   96/58 L  95 


 


 11/17/18 11:50  11/17/18 11:50  11/17/18 11:50  11/17/18 11:50  11/17/18 11:50








 Laboratory Results





 11/17/18 03:26 





 11/13/18 04:00 





 











 11/16/18 11/17/18 11/18/18





 05:59 05:59 05:59


 


Intake Total 1000 50 


 


Output Total 1165 750 


 


Balance -165 -700 








 











PT  19.6 SEC (12.0-15.0)  H  11/16/18  04:18    


 


INR  1.65  (0.83-1.16)  H  11/16/18  04:18    











 Laboratory Tests











  11/13/18 11/14/18





  04:00 12:10


 


Creatinine  0.9 


 


NT-Pro-B Natriuret Pep   5380 H














Physical Exam





- Physical Exam


General Appearance: WD/WN, alert, no apparent distress


EENT: No scleral icterus (R), No scleral icterus (L)


Neck: non-tender


Respiratory: lungs clear


Cardiac/Chest: irregularly irregular


Abdomen: soft


Skin: warm/dry, No rash


Extremities: No calf tenderness


Neuro/Psych: alert, No facial droop





ICD10 Worksheet


Patient Problems: 


 Problems











Problem Status Onset


 


Coronary artery bypass grafting Active  


 


Nausea and vomiting Active  


 


Non-healing surgical wound Active  


 


Hematuria syndrome Active  


 


Gastrointestinal bleeding, lower Acute  


 


Chronic Disease Management/Transitional Care Program Acute  


 


Acute combined systolic and diastolic CHF, NYHA class 3 Acute  


 


Coronary arteriosclerosis Acute  


 


S/P CABG x 3 Acute  


 


Chest pain Acute  


 


Elevated troponin Acute  


 


Elevated bilirubin Acute  


 


Subtherapeutic international normalized ratio (INR) Acute  


 


Altered mental status Acute  


 


Acute appendicitis Acute  


 


Septic shock Acute  














Review of Systems





- Review of Systems


Constitutional: denies: chills, fever


Respiratory: no symptoms reported


Cardiac: no symptoms reported


Genitourinary: no symptoms

## 2018-11-17 NOTE — SOAPPROG
SOAP Progress Note


Assessment/Plan: 


Assessment:  no overnight concerns. no pain.  breathing better.  afebrile.  

walking in [place] with PT.  abd soft.  continues to do well.  will stop ABX 1-2 

days - will not need repeat imaging.  will need continued PT assist prior to 

retuning home.  hopeful dc in 1-2 days. does not want to go to rehab.  apprec 

hospitalist and cards group assist.   


























Plan:





11/13/18 16:41





11/17/18 09:43





Objective: 





 Vital Signs











Temp Pulse Resp BP Pulse Ox


 


 36.6 C   72   1 L  103/54 L  97 


 


 11/17/18 08:00  11/17/18 08:00  11/17/18 08:00  11/17/18 08:00  11/17/18 08:00








 Laboratory Results





 11/17/18 03:26 





 11/13/18 04:00 





 











 11/16/18 11/17/18 11/18/18





 05:59 05:59 05:59


 


Intake Total 1000 50 


 


Output Total 1165 750 


 


Balance -165 -700 








 











PT  19.6 SEC (12.0-15.0)  H  11/16/18  04:18    


 


INR  1.65  (0.83-1.16)  H  11/16/18  04:18    














ICD10 Worksheet


Patient Problems: 


 Problems











Problem Status Onset


 


Acute appendicitis Acute  


 


Septic shock Acute  


 


Coronary artery bypass grafting Active  


 


Hematuria syndrome Active  


 


Nausea and vomiting Active  


 


Non-healing surgical wound Active  


 


Acute combined systolic and diastolic CHF, NYHA class 3 Acute  


 


Altered mental status Acute  


 


Chest pain Acute  


 


Chronic Disease Management/Transitional Care Program Acute  


 


Coronary arteriosclerosis Acute  


 


Elevated bilirubin Acute  


 


Elevated troponin Acute  


 


Gastrointestinal bleeding, lower Acute  


 


S/P CABG x 3 Acute  


 


Subtherapeutic international normalized ratio (INR) Acute

## 2018-11-17 NOTE — HOSPPROG
Hospitalist Progress Note


Assessment/Plan: 





DIAGNOSES: 


# Acute appendicitis: microperforation on CT (pers reviewed/interp)


   -cont ceftriaxone/flagyl


   -hopefully can continue to manage non operatively, this is going well so 

far.  surgery following


# severe sepsis due to above: resolved 


# acute hypoxemic respiratory failure with chronic hypoxemic respiratory 

failure and home O2 use:  Multifactorial with underlying heart disease, sepsis, 

atelectasis, abdominal distension and pain etc


   -Coreg restarted at this time, was initially held due to sepsis with 

hypotension


   -continue Lasix diuresis


   -echocardiogram ordered and result pending


# chronic systolic/diastolic heart failure, history of CABG: appears slightly 

volume up today


   -continue Lasix, beta-blocker resumed


   -cont digoxin, level in range 0.8


# factor V Leiden with recurrent PE/DVT: INR trending back down, still 3


   -continue anticoagulation and follow INR closely (Coumadin ordered by the 

surgery team)


# thrombocytopenia - likely due to infection, improving


   -will recheck tomorrow for stability


# chronic respiratory failure: at baseline o2 requirement of 2 LPM though lives 

at 7000 ft


# prostate cancer: resume oral med.  discussed with onc, xtandi does not cause 

BM suppression


# hx of lung cancer/sarcoma: s/p surgical intervention, currently stable











SUBJECTIVE:


Feeling better overall today, with less dyspnea, increased ambulation distance 

and speed


Eating well did have bowel movement


No nausea or vomiting


No fever symptoms





OBJECTIVE


Vitals reviewed:  Heart rates much better, using 1 L of oxygen which he uses at 

home though his home was at 7000 feet


Cardiac Monitor, my review:  Sinus





Exam:


alert oriented relaxed but looks tired


skin warm dry color ok


resps not labored


lungs diminished but otherwise clear BSs


heart regular


abd soft nondistended nontender, bowel sounds present


limbs warm, edema of legs is improved since yesterday





iv site ok





Laboratory data:


INR 1.65


Chem panel stable





Objective: 


 Vital Signs











Temp Pulse Resp BP Pulse Ox


 


 36.4 C   77   32 H  100/61   96 


 


 11/17/18 16:00  11/17/18 16:00  11/17/18 16:00  11/17/18 16:00  11/17/18 16:00








 Laboratory Results





 11/17/18 03:26 





 11/13/18 04:00 





 











 11/16/18 11/17/18 11/18/18





 06:59 06:59 06:59


 


Intake Total 1000 50 


 


Output Total 1165 750 


 


Balance -165 -700 








 











PT  19.6 SEC (12.0-15.0)  H  11/16/18  04:18    


 


INR  1.65  (0.83-1.16)  H  11/16/18  04:18    














- Time Spent With Patient


Time Spent with Patient: greater than 35 minutes


Time Spent with Patient: Greater than 35 minutes spent on this patients care, 

greater than 50% of time spent counseling, educating, and coordinating care 

regarding the above mentioned plan.





ICD10 Worksheet


Patient Problems: 


 Problems











Problem Status Onset


 


Acute appendicitis Acute  


 


Septic shock Acute  


 


Coronary artery bypass grafting Active  


 


Hematuria syndrome Active  


 


Nausea and vomiting Active  


 


Non-healing surgical wound Active  


 


Acute combined systolic and diastolic CHF, NYHA class 3 Acute  


 


Altered mental status Acute  


 


Chest pain Acute  


 


Chronic Disease Management/Transitional Care Program Acute  


 


Coronary arteriosclerosis Acute  


 


Elevated bilirubin Acute  


 


Elevated troponin Acute  


 


Gastrointestinal bleeding, lower Acute  


 


S/P CABG x 3 Acute  


 


Subtherapeutic international normalized ratio (INR) Acute

## 2018-11-17 NOTE — ASMTCMCOM
CM Note

 

CM Note                       

Notes:

Per surgeon, patient may discharge in 1-2 days. I updated Luz at [facility]. Case 

Management will follow. 

 

Date Signed:  11/17/2018 01:49 PM

Electronically Signed By:Luisa Luong RN

## 2018-11-18 LAB
INR PPP: 1.55 (ref 0.83–1.16)
PROTHROMBIN TIME: 18.7 SEC (ref 12–15)

## 2018-11-18 RX ADMIN — ACETAMINOPHEN PRN MG: 325 TABLET ORAL at 03:37

## 2018-11-18 RX ADMIN — TAMSULOSIN HYDROCHLORIDE SCH MG: 0.4 CAPSULE ORAL at 11:47

## 2018-11-18 RX ADMIN — DIGOXIN SCH MCG: 125 TABLET ORAL at 11:47

## 2018-11-18 RX ADMIN — DOCUSATE SODIUM AND SENNOSIDES SCH: 50; 8.6 TABLET ORAL at 22:01

## 2018-11-18 RX ADMIN — FUROSEMIDE SCH MG: 40 TABLET ORAL at 11:46

## 2018-11-18 RX ADMIN — AMOXICILLIN AND CLAVULANATE POTASSIUM SCH MG: 875; 125 TABLET, FILM COATED ORAL at 11:46

## 2018-11-18 RX ADMIN — CARVEDILOL SCH MG: 3.12 TABLET, FILM COATED ORAL at 18:14

## 2018-11-18 RX ADMIN — WARFARIN SODIUM SCH MG: 1 TABLET ORAL at 11:46

## 2018-11-18 RX ADMIN — OXYCODONE HYDROCHLORIDE AND ACETAMINOPHEN SCH MG: 500 TABLET ORAL at 11:47

## 2018-11-18 RX ADMIN — CARVEDILOL SCH MG: 3.12 TABLET, FILM COATED ORAL at 11:47

## 2018-11-18 RX ADMIN — VITAMIN D, TAB 1000IU (100/BT) SCH UNITS: 25 TAB at 11:47

## 2018-11-18 RX ADMIN — TEMAZEPAM PRN MG: 15 CAPSULE ORAL at 23:25

## 2018-11-18 RX ADMIN — AMOXICILLIN AND CLAVULANATE POTASSIUM SCH MG: 875; 125 TABLET, FILM COATED ORAL at 21:58

## 2018-11-18 RX ADMIN — LEVOTHYROXINE SODIUM SCH MCG: 150 TABLET ORAL at 06:52

## 2018-11-18 RX ADMIN — PANTOPRAZOLE SODIUM SCH MG: 40 TABLET, DELAYED RELEASE ORAL at 11:47

## 2018-11-18 RX ADMIN — PSYLLIUM HUSK SCH: 3.4 GRANULE ORAL at 12:11

## 2018-11-18 RX ADMIN — IRON SUPPLEMENT SCH MG: 325 TABLET ORAL at 11:47

## 2018-11-18 RX ADMIN — POLYETHYLENE GLYCOL 3350 SCH: 17 POWDER, FOR SOLUTION ORAL at 12:11

## 2018-11-18 RX ADMIN — ROSUVASTATIN CALCIUM SCH MG: 10 TABLET, FILM COATED ORAL at 11:47

## 2018-11-18 RX ADMIN — WARFARIN SODIUM SCH MG: 5 TABLET ORAL at 11:46

## 2018-11-18 RX ADMIN — DOCUSATE SODIUM AND SENNOSIDES SCH TAB: 50; 8.6 TABLET ORAL at 11:47

## 2018-11-18 NOTE — HOSPPROG
Hospitalist Progress Note


Assessment/Plan: 





DIAGNOSES: 


# Acute appendicitis: microperforation on CT (pers reviewed/interp)


   -continue Augmentin, stop that at discharge


   -it appears he will be able to resolve this non operatively


# severe sepsis due to above: resolved 


# acute hypoxemic respiratory failure with chronic hypoxemic respiratory 

failure and home O2 use:  Multifactorial with underlying heart disease, sepsis,


   atelectasis, abdominal distension and pain etc; history of heart failure and 

did have some edema here, resolving with Lasix at his home dose


   -Coreg restarted at this time, was initially held due to sepsis with 

hypotension


   -continue Lasix diuresis at his usual home dose


   -home dose of digoxin has been resumed


# factor V Leiden with recurrent PE/DVT: 


   -Coumadin had been stopped due to potential need for surgery and high INR at 

admission but now restarted


   -INRs order daily, a bit low now and will need monitoring until in range


# thrombocytopenia - likely due to infection, improving


# chronic respiratory failure: at baseline o2 requirement of 2 LPM though lives 

at 7000 ft


# prostate cancer: resume oral med.  discussed with onc, xtandi does not cause 

BM suppression


# hx of lung cancer/sarcoma: s/p surgical intervention, currently stable


# DISPOSITION:  At this point I believe the patient will be probably able to 

get home in the next wound a 2 days.  This will depend largely on his 

ambulation and his breathing.  We should consider that he does live at 7000 ft.

  The patient is not willing to consider skilled nursing facility





** today the patient mentioned to me that he has been having intermittent black 

stools for 5-6 months.  He discussed this with Dr. Cacnhola PCP in October.

  At that time there was a decision made not to test the stools or pursue any 

investigation as he did not have microcytosis or concerning anemia and he takes 

regular iron and frequent charcoal which returned stools black.  Today he we 

expressed his concern about the black stools and noted that there was a dark 

stool today.  Looking at his picture I notice that his MCV is actually now 

higher than it was in October and considering the severity of his infectious 

illness he is not concerningly anemic compared to what was seen in October.  

The patient has requested that we test his stool here for blood, and I have 

agreed to do so though I have cautioned him that it is very possible he could 

have some minor gastric ulceration related to his severe acute illness and in 

the setting of taking anticoagulant it would not be surprising if there were a 

trickle of blood.  If there is evidence of blood, would probably recommend 

allowing him to recover more completely from his appendiceal perforation before 

considering endoscopic workup.  Would let Dr. Prado arrange that if 

indicated





The patient had many other questions today about his illness, his recovery, and 

his discharge planning.  These were all answered to his satisfaction.





SUBJECTIVE:


Feeling better overall today, with less dyspnea, increased ambulation distance 

and speed


However after taking laxatives today he had 3 large loose stools without pain 

bleeding or cramping or fever, and feels tired after these bowel movements


No nausea or vomiting, eating okay


No fever symptoms





OBJECTIVE


Vitals reviewed:  Heart rates much better, using 1-2 L of oxygen which he uses 

at home though his home was at 7000 feet


Cardiac Monitor, my review:  Sinus





Exam:


alert oriented relaxed but looks tired


skin warm dry color ok


resps not labored


lungs diminished but otherwise clear BSs


heart regular


abd soft nondistended nontender, bowel sounds present


limbs warm, edema of legs is largely resolved





iv site ok





Laboratory data:


INR 1.54








Objective: 


 Vital Signs











Temp Pulse Resp BP Pulse Ox


 


 36.8 C   83   24 H  121/74 H  93 


 


 11/18/18 15:45  11/18/18 15:45  11/18/18 15:45  11/18/18 15:45  11/18/18 15:45








 Laboratory Results





 11/17/18 03:26 





 11/13/18 04:00 





 











 11/17/18 11/18/18 11/19/18





 06:59 06:59 06:59


 


Intake Total 50 700 


 


Output Total 750 1225 500


 


Balance -700 -525 -500








 











PT  18.7 SEC (12.0-15.0)  H  11/18/18  03:30    


 


INR  1.55  (0.83-1.16)  H  11/18/18  03:30    














- Time Spent With Patient


Time Spent with Patient: greater than 35 minutes


Time Spent with Patient: Greater than 35 minutes spent on this patients care, 

greater than 50% of time spent counseling, educating, and coordinating care 

regarding the above mentioned plan.





ICD10 Worksheet


Patient Problems: 


 Problems











Problem Status Onset


 


Acute appendicitis Acute  


 


Septic shock Acute  


 


Coronary artery bypass grafting Active  


 


Hematuria syndrome Active  


 


Nausea and vomiting Active  


 


Non-healing surgical wound Active  


 


Acute combined systolic and diastolic CHF, NYHA class 3 Acute  


 


Altered mental status Acute  


 


Chest pain Acute  


 


Chronic Disease Management/Transitional Care Program Acute  


 


Coronary arteriosclerosis Acute  


 


Elevated bilirubin Acute  


 


Elevated troponin Acute  


 


Gastrointestinal bleeding, lower Acute  


 


S/P CABG x 3 Acute  


 


Subtherapeutic international normalized ratio (INR) Acute

## 2018-11-18 NOTE — SOAPPROG
SOAP Progress Note


Assessment/Plan: 


Assessment:  good night.  noted mild RLQ pain last violet improved after BM (as 

did his LLQ pain).  no nausea.  no current pain concerns.  breathing better.  

did well with PT yesterday. AVSS.  comfortable.  abd nontender.  acute 

appendicitis with microperf - improved with nonop mgmnt.  cont PT.  on usual 

home cardiac meds with improvement in breathing.  stop abx after tomorrow (

arbitrary 1 week).  f/u office 1 week.  discussed possibility for recurrence (

espec given presence of appendicolith).  add fiber supplement for constipation (

chronic).  patient very pleased with his progress.  
































Plan:





11/13/18 16:41





11/17/18 09:43





11/18/18 10:21





11/18/18 10:24





Objective: 





 Vital Signs











Temp Pulse Resp BP Pulse Ox


 


 36.6 C   68   23 H  102/64   98 


 


 11/18/18 08:00  11/18/18 08:00  11/18/18 08:00  11/18/18 08:00  11/18/18 08:00








 Laboratory Results





 11/17/18 03:26 





 11/13/18 04:00 





 











 11/17/18 11/18/18 11/19/18





 05:59 05:59 05:59


 


Intake Total 50 700 


 


Output Total 750 1225 300


 


Balance -700 -525 -300








 











PT  18.7 SEC (12.0-15.0)  H  11/18/18  03:30    


 


INR  1.55  (0.83-1.16)  H  11/18/18  03:30    














ICD10 Worksheet


Patient Problems: 


 Problems











Problem Status Onset


 


Acute appendicitis Acute  


 


Septic shock Acute  


 


Coronary artery bypass grafting Active  


 


Hematuria syndrome Active  


 


Nausea and vomiting Active  


 


Non-healing surgical wound Active  


 


Acute combined systolic and diastolic CHF, NYHA class 3 Acute  


 


Altered mental status Acute  


 


Chest pain Acute  


 


Chronic Disease Management/Transitional Care Program Acute  


 


Coronary arteriosclerosis Acute  


 


Elevated bilirubin Acute  


 


Elevated troponin Acute  


 


Gastrointestinal bleeding, lower Acute  


 


S/P CABG x 3 Acute  


 


Subtherapeutic international normalized ratio (INR) Acute

## 2018-11-19 VITALS — SYSTOLIC BLOOD PRESSURE: 99 MMHG | DIASTOLIC BLOOD PRESSURE: 54 MMHG

## 2018-11-19 LAB
INR PPP: 1.89 (ref 0.83–1.16)
PROTHROMBIN TIME: 21.8 SEC (ref 12–15)

## 2018-11-19 RX ADMIN — DIGOXIN SCH MCG: 125 TABLET ORAL at 09:02

## 2018-11-19 RX ADMIN — LEVOTHYROXINE SODIUM SCH MCG: 150 TABLET ORAL at 05:19

## 2018-11-19 RX ADMIN — ROSUVASTATIN CALCIUM SCH MG: 10 TABLET, FILM COATED ORAL at 09:03

## 2018-11-19 RX ADMIN — WARFARIN SODIUM SCH MG: 5 TABLET ORAL at 09:04

## 2018-11-19 RX ADMIN — TAMSULOSIN HYDROCHLORIDE SCH MG: 0.4 CAPSULE ORAL at 09:01

## 2018-11-19 RX ADMIN — PSYLLIUM HUSK SCH: 3.4 GRANULE ORAL at 09:05

## 2018-11-19 RX ADMIN — POLYETHYLENE GLYCOL 3350 SCH: 17 POWDER, FOR SOLUTION ORAL at 09:05

## 2018-11-19 RX ADMIN — OXYCODONE HYDROCHLORIDE AND ACETAMINOPHEN SCH MG: 500 TABLET ORAL at 09:03

## 2018-11-19 RX ADMIN — IRON SUPPLEMENT SCH MG: 325 TABLET ORAL at 09:02

## 2018-11-19 RX ADMIN — DOCUSATE SODIUM AND SENNOSIDES SCH TAB: 50; 8.6 TABLET ORAL at 09:03

## 2018-11-19 RX ADMIN — ACETAMINOPHEN PRN MG: 325 TABLET ORAL at 05:19

## 2018-11-19 RX ADMIN — FUROSEMIDE SCH MG: 40 TABLET ORAL at 09:03

## 2018-11-19 RX ADMIN — CARVEDILOL SCH MG: 3.12 TABLET, FILM COATED ORAL at 09:02

## 2018-11-19 RX ADMIN — WARFARIN SODIUM SCH MG: 1 TABLET ORAL at 09:02

## 2018-11-19 RX ADMIN — VITAMIN D, TAB 1000IU (100/BT) SCH UNITS: 25 TAB at 09:03

## 2018-11-19 RX ADMIN — AMOXICILLIN AND CLAVULANATE POTASSIUM SCH MG: 875; 125 TABLET, FILM COATED ORAL at 09:03

## 2018-11-19 RX ADMIN — PANTOPRAZOLE SODIUM SCH MG: 40 TABLET, DELAYED RELEASE ORAL at 09:03

## 2018-11-19 NOTE — ASMTDCNOTE
Case Management Discharge

 

Discharge Order Complete?     Answers:  Yes                                   

Patient to Obtain             Answers:  via Family                            

Medications                                                                   

Transportation Arranged       Answers:  Family/Friends                        

Faxed Final Orders            Answers:  Yes                                   

Agency/Facility Transfer      Answers:  Yes                                   

Report Printed & Faxed to                                                     

Receiving Agency                                                              

Discharge Comments            

Notes:

CM discussed homecare choice with pt and agreed on continuing with [facility] 

RN/PT. Left message for [facility] to alert on discharge. Orders sent. Wife to transfer back 

home. 

 

Date Signed:  11/19/2018 02:42 PM

Electronically Signed By:Megha Holden RN

## 2018-11-19 NOTE — PDIAF
- Diagnosis


Diagnosis: Appendicitis


Code Status: Full Code





- Medication Management


Discharge Medications: electronically signed and located in the Home Medication 

List.





- Orders


Services needed: Registered Nurse, Physical Therapy


Isolation Type: Chemotherapy Isolation


Diet Recommendation: cardiac -low fat low salt


Diet Texture: Regular Texture Diet


Zafar: Not applicable





- Follow Up Care


Current Providers and Referrals: 


Jim Solomon MD [Medical Doctor] - follow up in 1 week (follow-up 1 week 

after discharge)


Michael Prado MD [Primary Care Provider] - As per Instructions

## 2018-11-19 NOTE — SOAPPROG
SOAP Progress Note


Assessment/Plan: 


Assessment/Plan:





83 yo admitted for acute appendicitis.  treated non operatively form multiple 

comorbid issues.  Back to baseline except mild constipation and dark stool (on 

Fe)





AA&O


Abd soft, NT ND,  no rebound


Minimal edema/venous stasis change





Doing well


Check hemocult stool


D/C from surgery okay.


F/u 1 week in office








11/19/18 13:01





Objective: 





 Vital Signs











Temp Pulse Resp BP Pulse Ox


 


 36.6 C   73   24 H  85/48 L  95 


 


 11/19/18 11:59  11/19/18 11:59  11/19/18 11:59  11/19/18 11:59  11/19/18 11:59








 Laboratory Results





 11/17/18 03:26 





 11/13/18 04:00 





 











 11/18/18 11/19/18 11/20/18





 05:59 05:59 05:59


 


Intake Total 700 250 163


 


Output Total 1225 850 425


 


Balance -525 -600 -262








 











PT  21.8 SEC (12.0-15.0)  H  11/19/18  03:14    


 


INR  1.89  (0.83-1.16)  H  11/19/18  03:14    














ICD10 Worksheet


Patient Problems: 


 Problems











Problem Status Onset


 


Acute appendicitis Acute  


 


Septic shock Acute  


 


Coronary artery bypass grafting Active  


 


Hematuria syndrome Active  


 


Nausea and vomiting Active  


 


Non-healing surgical wound Active  


 


Acute combined systolic and diastolic CHF, NYHA class 3 Acute  


 


Altered mental status Acute  


 


Chest pain Acute  


 


Chronic Disease Management/Transitional Care Program Acute  


 


Coronary arteriosclerosis Acute  


 


Elevated bilirubin Acute  


 


Elevated troponin Acute  


 


Gastrointestinal bleeding, lower Acute  


 


S/P CABG x 3 Acute  


 


Subtherapeutic international normalized ratio (INR) Acute

## 2018-11-19 NOTE — ASMTLACE
LACE

 

Length of stay for            Answers:  4-6 days                              

current admission                                                             

Acuity / Level of             Answers:  Yes                                   

Care: Did the patient                                                         

have an inpatient                                                             

admission?                                                                    

Comorbidities - select        Answers:  Any tumor (including                  

all that apply                          lymphoma or leukemia)                 

                                        Chronic pulmonary disease             

                                        Congestive heart failure              

                                        Coronary Artery Disease               

                                        Opioid dependence                     

                                        / Chronic pain                        

                                        Previous myocardial                   

                                        infarction                            

                                        Other                         Notes:  Factor 5 Leiden; Hx of 


                                                                              PE/DVT; HTN

# of Emergency department     Answers:  1-2                                   

visits in the last 6                                                          

months                                                                        

Score: 22

 

Date Signed:  11/19/2018 02:43 PM

Electronically Signed By:Megha Holden RN

## 2019-04-26 ENCOUNTER — HOSPITAL ENCOUNTER (OUTPATIENT)
Dept: HOSPITAL 80 - FIMAGING | Age: 84
End: 2019-04-26
Attending: INTERNAL MEDICINE
Payer: COMMERCIAL

## 2019-04-26 DIAGNOSIS — J44.9: ICD-10-CM

## 2019-04-26 DIAGNOSIS — R04.2: Primary | ICD-10-CM

## 2019-04-26 DIAGNOSIS — K80.20: ICD-10-CM

## 2019-12-18 ENCOUNTER — APPOINTMENT (RX ONLY)
Dept: URBAN - METROPOLITAN AREA CLINIC 100 | Facility: CLINIC | Age: 84
Setting detail: DERMATOLOGY
End: 2019-12-18

## 2019-12-18 DIAGNOSIS — L85.3 XEROSIS CUTIS: ICD-10-CM

## 2019-12-18 DIAGNOSIS — L57.0 ACTINIC KERATOSIS: ICD-10-CM

## 2019-12-18 DIAGNOSIS — D69.2 OTHER NONTHROMBOCYTOPENIC PURPURA: ICD-10-CM

## 2019-12-18 DIAGNOSIS — Z85.820 PERSONAL HISTORY OF MALIGNANT MELANOMA OF SKIN: ICD-10-CM

## 2019-12-18 DIAGNOSIS — L81.8 OTHER SPECIFIED DISORDERS OF PIGMENTATION: ICD-10-CM

## 2019-12-18 DIAGNOSIS — D18.0 HEMANGIOMA: ICD-10-CM

## 2019-12-18 DIAGNOSIS — L82.1 OTHER SEBORRHEIC KERATOSIS: ICD-10-CM

## 2019-12-18 PROBLEM — D18.01 HEMANGIOMA OF SKIN AND SUBCUTANEOUS TISSUE: Status: ACTIVE | Noted: 2019-12-18

## 2019-12-18 PROBLEM — D48.5 NEOPLASM OF UNCERTAIN BEHAVIOR OF SKIN: Status: ACTIVE | Noted: 2019-12-18

## 2019-12-18 PROCEDURE — ? BIOPSY BY SHAVE METHOD

## 2019-12-18 PROCEDURE — 99203 OFFICE O/P NEW LOW 30 MIN: CPT | Mod: 25

## 2019-12-18 PROCEDURE — 11102 TANGNTL BX SKIN SINGLE LES: CPT

## 2019-12-18 PROCEDURE — 17003 DESTRUCT PREMALG LES 2-14: CPT

## 2019-12-18 PROCEDURE — 11103 TANGNTL BX SKIN EA SEP/ADDL: CPT

## 2019-12-18 PROCEDURE — ? FULL BODY SKIN EXAM

## 2019-12-18 PROCEDURE — ? LIQUID NITROGEN

## 2019-12-18 PROCEDURE — 17000 DESTRUCT PREMALG LESION: CPT | Mod: 59

## 2019-12-18 PROCEDURE — ? COUNSELING

## 2019-12-18 ASSESSMENT — LOCATION DETAILED DESCRIPTION DERM
LOCATION DETAILED: LEFT SUPERIOR UPPER BACK
LOCATION DETAILED: EPIGASTRIC SKIN
LOCATION DETAILED: RIGHT INFERIOR UPPER BACK
LOCATION DETAILED: RIGHT MEDIAL INFERIOR CHEST
LOCATION DETAILED: RIGHT CLAVICULAR SKIN
LOCATION DETAILED: LEFT VENTRAL PROXIMAL FOREARM
LOCATION DETAILED: LEFT SUPERIOR MEDIAL MIDBACK
LOCATION DETAILED: LEFT PROXIMAL POSTERIOR UPPER ARM
LOCATION DETAILED: LEFT MEDIAL INFERIOR CHEST
LOCATION DETAILED: LEFT MEDIAL SUPERIOR CHEST
LOCATION DETAILED: RIGHT SUPERIOR UPPER BACK
LOCATION DETAILED: RIGHT INFERIOR MEDIAL MIDBACK
LOCATION DETAILED: XIPHOID
LOCATION DETAILED: RIGHT SUPERIOR LATERAL UPPER BACK

## 2019-12-18 ASSESSMENT — LOCATION ZONE DERM
LOCATION ZONE: ARM
LOCATION ZONE: TRUNK

## 2019-12-18 ASSESSMENT — LOCATION SIMPLE DESCRIPTION DERM
LOCATION SIMPLE: RIGHT CLAVICULAR SKIN
LOCATION SIMPLE: RIGHT UPPER BACK
LOCATION SIMPLE: CHEST
LOCATION SIMPLE: LEFT UPPER ARM
LOCATION SIMPLE: RIGHT LOWER BACK
LOCATION SIMPLE: LEFT LOWER BACK
LOCATION SIMPLE: LEFT FOREARM
LOCATION SIMPLE: LEFT UPPER BACK
LOCATION SIMPLE: ABDOMEN

## 2019-12-18 NOTE — PROCEDURE: BIOPSY BY SHAVE METHOD
Anesthesia Volume In Cc (Will Not Render If 0): 0.5
Type Of Destruction Used: Curettage
Electrodesiccation And Curettage Text: The wound bed was treated with electrodesiccation and curettage after the biopsy was performed.
Dressing: bandage
Hide Topical Anesthesia?: No
Was A Bandage Applied: Yes
Hemostasis: Drysol
Silver Nitrate Text: The wound bed was treated with silver nitrate after the biopsy was performed.
Detail Level: Detailed
Biopsy Method: Dermablade
Billing Type: Third-Party Bill
Lab: [id number]
Curettage Text: The wound bed was treated with curettage after the biopsy was performed.
Post-Care Instructions: I reviewed with the patient in detail post-care instructions. Patient is to keep the biopsy site dry overnight, and then apply bacitracin twice daily until healed. Patient may apply hydrogen peroxide soaks to remove any crusting.
Anesthesia Type: 1% lidocaine with epinephrine and a 1:10 solution of 8.4% sodium bicarbonate
Cryotherapy Text: The wound bed was treated with cryotherapy after the biopsy was performed.
Size Of Lesion In Cm: 0
Wound Care: Petrolatum
Lab Facility: [id number]
Depth Of Biopsy: dermis
Notification Instructions: Patient will be notified of biopsy results. However, patient instructed to call the office if not contacted within 2 weeks.
Electrodesiccation Text: The wound bed was treated with electrodesiccation after the biopsy was performed.
Consent: Written consent was obtained and risks were reviewed including but not limited to scarring, infection, bleeding, scabbing, incomplete removal, nerve damage and allergy to anesthesia.
Biopsy Type: H and E

## 2019-12-18 NOTE — HPI: EVALUATION OF SKIN LESION(S)
What Type Of Note Output Would You Prefer (Optional)?: Bullet Format
Hpi Title: Evaluation of Skin Lesions
How Severe Are Your Spot(S)?: mild
Have Your Spot(S) Been Treated In The Past?: has not been treated
Location: Left arm
Year Removed: 25 years ago

## 2019-12-23 ENCOUNTER — APPOINTMENT (RX ONLY)
Dept: URBAN - METROPOLITAN AREA CLINIC 100 | Facility: CLINIC | Age: 84
Setting detail: DERMATOLOGY
End: 2019-12-23

## 2019-12-23 DIAGNOSIS — B96.5 PSEUDOMONAS (AERUGINOSA) (MALLEI) (PSEUDOMALLEI) AS THE CAUSE OF DISEASES CLASSIFIED ELSEWHERE: ICD-10-CM

## 2019-12-23 DIAGNOSIS — Z48.817 ENCOUNTER FOR SURGICAL AFTERCARE FOLLOWING SURGERY ON THE SKIN AND SUBCUTANEOUS TISSUE: ICD-10-CM

## 2019-12-23 PROBLEM — D48.5 NEOPLASM OF UNCERTAIN BEHAVIOR OF SKIN: Status: ACTIVE | Noted: 2019-12-23

## 2019-12-23 PROBLEM — C44.519 BASAL CELL CARCINOMA OF SKIN OF OTHER PART OF TRUNK: Status: ACTIVE | Noted: 2019-12-23

## 2019-12-23 PROCEDURE — ? PRESCRIPTION

## 2019-12-23 PROCEDURE — ? BIOPSY BY SHAVE METHOD

## 2019-12-23 PROCEDURE — ? CURETTAGE AND DESTRUCTION

## 2019-12-23 PROCEDURE — 17262 DSTRJ MAL LES T/A/L 1.1-2.0: CPT | Mod: 79

## 2019-12-23 PROCEDURE — ? POST-OP WOUND CHECK

## 2019-12-23 PROCEDURE — 11102 TANGNTL BX SKIN SINGLE LES: CPT | Mod: 59,79

## 2019-12-23 PROCEDURE — ? DEFER

## 2019-12-23 PROCEDURE — 99024 POSTOP FOLLOW-UP VISIT: CPT

## 2019-12-23 RX ORDER — MUPIROCIN 20 MG/G
OINTMENT TOPICAL
Qty: 1 | Refills: 0 | Status: ERX | COMMUNITY
Start: 2019-12-23

## 2019-12-23 RX ADMIN — MUPIROCIN: 20 OINTMENT TOPICAL at 00:00

## 2019-12-23 ASSESSMENT — LOCATION ZONE DERM: LOCATION ZONE: ARM

## 2019-12-23 ASSESSMENT — LOCATION SIMPLE DESCRIPTION DERM: LOCATION SIMPLE: RIGHT UPPER ARM

## 2019-12-23 ASSESSMENT — LOCATION DETAILED DESCRIPTION DERM: LOCATION DETAILED: RIGHT LATERAL PROXIMAL UPPER ARM

## 2019-12-23 NOTE — PROCEDURE: CURETTAGE AND DESTRUCTION
Biopsy Photograph Reviewed: Yes
Post-Care Instructions: I reviewed with the patient in detail post-care instructions. Patient is to keep the area dry for 48 hours, and not to engage in any swimming until the area is healed. Should the patient develop any fevers, chills, bleeding, severe pain patient will contact the office immediately.
Cautery Type: electrocautery (heat cautery)
Additional Information: (Optional): The wound was cleaned, and a pressure dressing was applied.  The patient received detailed post-op instructions.
Hide Accession Number?: No
Concentration (Mg/Ml Or Millions Of Plaque Forming Units/Cc): 0.01
Number Of Curettages: 3
Consent was obtained from the patient. The risks, benefits and alternatives to therapy were discussed in detail. Specifically, the risks of infection, scarring, bleeding, prolonged wound healing, nerve injury, incomplete removal, allergy to anesthesia and recurrence were addressed. Alternatives to ED&C, such as: surgical removal and XRT were also discussed.  Prior to the procedure, the treatment site was clearly identified and confirmed by the patient. All components of Universal Protocol/PAUSE Rule completed.
Anesthesia Type: 1% lidocaine with epinephrine and a 1:10 solution of 8.4% sodium bicarbonate
Size Of Lesion After Curettage: 1.4
Anesthesia Volume In Cc: 2
Total Volume (Ccs): 1
Bill As A Line Item Or As Units: Line Item
What Was Performed First?: Curettage
Detail Level: Detailed

## 2019-12-23 NOTE — PROCEDURE: BIOPSY BY SHAVE METHOD
Lab: [id number]
Billing Type: Third-Party Bill
Curettage Text: The wound bed was treated with curettage after the biopsy was performed.
Hide Additional Size Dimension?: No
Detail Level: Detailed
Biopsy Method: Dermablade
Hemostasis: Drysol
Was A Bandage Applied: Yes
Silver Nitrate Text: The wound bed was treated with silver nitrate after the biopsy was performed.
Dressing: bandage
Electrodesiccation And Curettage Text: The wound bed was treated with electrodesiccation and curettage after the biopsy was performed.
Anesthesia Volume In Cc (Will Not Render If 0): 0.5
Consent: Written consent was obtained and risks were reviewed including but not limited to scarring, infection, bleeding, scabbing, incomplete removal, nerve damage and allergy to anesthesia.
Type Of Destruction Used: Curettage
Biopsy Type: H and E
X Size Of Lesion In Cm: 0
Electrodesiccation Text: The wound bed was treated with electrodesiccation after the biopsy was performed.
Notification Instructions: Patient will be notified of biopsy results. However, patient instructed to call the office if not contacted within 2 weeks.
Wound Care: Petrolatum
Depth Of Biopsy: dermis
Cryotherapy Text: The wound bed was treated with cryotherapy after the biopsy was performed.
Post-Care Instructions: I reviewed with the patient in detail post-care instructions. Patient is to keep the biopsy site dry overnight, and then apply bacitracin twice daily until healed. Patient may apply hydrogen peroxide soaks to remove any crusting.
Anesthesia Type: 1% lidocaine with epinephrine and a 1:10 solution of 8.4% sodium bicarbonate

## 2019-12-23 NOTE — PROCEDURE: POST-OP WOUND CHECK
Detail Level: Detailed
Wound Assessment Override (Optional): green drainage
Add 05890 Cpt? (Important Note: In 2017 The Use Of 51011 Is Being Tracked By Cms To Determine Future Global Period Reimbursement For Global Periods): yes

## 2019-12-27 ENCOUNTER — APPOINTMENT (RX ONLY)
Dept: URBAN - METROPOLITAN AREA CLINIC 100 | Facility: CLINIC | Age: 84
Setting detail: DERMATOLOGY
End: 2019-12-27

## 2019-12-27 PROBLEM — C44.612 BASAL CELL CARCINOMA OF SKIN OF RIGHT UPPER LIMB, INCLUDING SHOULDER: Status: ACTIVE | Noted: 2019-12-27

## 2019-12-27 PROBLEM — C44.519 BASAL CELL CARCINOMA OF SKIN OF OTHER PART OF TRUNK: Status: ACTIVE | Noted: 2019-12-27

## 2019-12-27 PROCEDURE — 17262 DSTRJ MAL LES T/A/L 1.1-2.0: CPT | Mod: 79

## 2019-12-27 PROCEDURE — ? CURETTAGE AND DESTRUCTION

## 2019-12-27 PROCEDURE — 17261 DSTRJ MAL LES T/A/L .6-1.0CM: CPT | Mod: 79

## 2019-12-27 NOTE — PROCEDURE: CURETTAGE AND DESTRUCTION
Total Volume (Ccs): 1
Anesthesia Volume In Cc: 2
Bill For Surgical Tray: no
What Was Performed First?: Curettage
Detail Level: Detailed
Post-Care Instructions: I reviewed with the patient in detail post-care instructions. Patient is to keep the area dry for 48 hours, and not to engage in any swimming until the area is healed. Should the patient develop any fevers, chills, bleeding, severe pain patient will contact the office immediately.
Size Of Lesion In Cm: 0.7
Bill As A Line Item Or As Units: Line Item
Cautery Type: electrodesiccation
Additional Information: (Optional): The wound was cleaned, and a pressure dressing was applied.  The patient received detailed post-op instructions.
Consent was obtained from the patient. The risks, benefits and alternatives to therapy were discussed in detail. Specifically, the risks of infection, scarring, bleeding, prolonged wound healing, nerve injury, incomplete removal, allergy to anesthesia and recurrence were addressed. Alternatives to ED&C, such as: surgical removal and XRT were also discussed.  Prior to the procedure, the treatment site was clearly identified and confirmed by the patient. All components of Universal Protocol/PAUSE Rule completed.
Biopsy Photograph Reviewed: Yes
Anesthesia Type: 1% lidocaine with epinephrine and a 1:10 solution of 8.4% sodium bicarbonate
Number Of Curettages: 3
Concentration (Mg/Ml Or Millions Of Plaque Forming Units/Cc): 0.01
Size Of Lesion After Curettage: 1.3

## 2020-08-30 NOTE — EDPHY
HPI/HX/ROS/PE/MDM


Narrative: 





CHIEF COMPLAINT: Low INR 





HPI: 


This patient is an anticoagulated (Coumadin) 83 year old male arriving via EMS 

from [facility] for evaluation of low INR. He underwent an 

aortic valve replacement nine days ago, 11/13/17 with Dr. Markus Haney, 

cardiologist. He discontinued his Coumadin for the procedure, and did not have 

any Lovenox or other bridging medications. He has now restarted Coumadin, and 

is taking 10mg daily. His INR was 1.4, and he states it should be higher per 

doctor's orders. He also notes "apparently I was a little incoherent" as the 

impetus for his visit today.





The patient's wife at bedside states that he was doing well Monday morning but 

when she visited him around 3pm that afternoon, he was napping and she was 

unable to wake him easily. He seemed confused upon waking, said "something is 

wrong with me". Yesterday morning, he was unable to remember that incident. 

Yesterday evening, he had repeated memory deficits and confusion. Today, his 

wife insisted he be evaluated.  The patient has no current complaints. He 

denies chest pain, shortness of breath, fever, headache, vomiting, diarrhea, 

melena, urinary complaints, or other associated symptoms. 





REVIEW OF SYSTEMS:


Aside from elements discussed in the HPI, a comprehensive 10-point review of 

systems was reviewed and is negative.





PMH: 


1. S/p aortic valve replacement 11/13/17. 


2. Pacemaker in place. 


3. Bone cancer


4. Left lung removal 


5. Hip surgery 


6. CHF


7. COPD


8. History of MI, stent placement 2014 


9. PE


10. Pacemaker placed January 2017


11. Factor V Leiden 





SOCIAL HISTORY: . Wife at bedside. Lives in [place]. Retired. 





PHYSICAL EXAM:


General:Patient is alert, in no acute distress.


ENT:Eyes are normal to inspection.  ENT inspection normal.


Neck: Normal inspection.  Full range of motion.


Respiratory:No respiratory distress.  Breath sounds normal bilaterally.


Cardiovascular: Regular rate and rhythm.  Strong peripheral pulses.  Normal cap 

refill.


Abdomen:The abdomen is nontender to palpation. There are no peritoneal signs. 

There are normal bowel sounds.


Back: Normal to inspection.  No tenderness to palpation.


Skin: Normal color.  No rash.  Warm and dry.


Extremities: Normal appearance.  Full range of motion.


Neuro: Oriented x3.  Normal motor function.  Normal sensory function.


ED Course: 





17:17 Received EMS report at bedside.





This 83 year old male presents for evaluation following a sub-therapeutic INR 

measurement and multiple episodes of confusion and memory deficits over the 

last two days. He underwent TAVR 11/13/17 with Dr. Markus Haney, 

cardiologist. IV established. Plan for labs including CBC, chemistries, Troponin

, coag. Plan for CT head. Unable to perform MRI brain as patient has pacemaker 

in place. 





18:19 Spoke with Dr. Ugalde, radiologist. CT head negative for acute 

processes. 





Plan to admit. 





19:39 Spoke with Cele at [facility]. The patient's last does of 

Lovenox was 11/12/17 at 8am. The order was reinstated today, but registered to 

be administered at 17:00, at which point the patient had already departed the 

facility en route here to the emergency department. 





19:42 Spoke with Dr. Elmore, cardiologist on call for Dr. Haney. He will 

consult. 





19:54 Spoke with Dr. Rodriguez, hospitalist. She accepts admission for 

subtherapeutic INR, 


MDM: 





This patient presents with report of several episodes of confusion over the 

last few days, in the setting of extended period of subtherapeutic INR and 

hypercoaguable history.  I am concerned these episodes represent small embolic 

CVAs, but unfortunately we cannot perform MRI to exclude, as patient has a 

pacer.  I think patient requires admission for continued INR monitoring and 

neuro workup.  I see no evidence of SAH, epidural hematoma, sepsis, PNA or CHF.





- Data Points


Imaging Results: 


 Imaging Impressions





Chest X-Ray  11/22/17 17:27


Impression: Overall, not significantly changed since about one month ago, with 

small right-sided pleural effusion, large hiatal hernia, mild pulmonary edema, 

and moderate cardiomegaly. Status post TAVR.  


 








Head CT  11/22/17 17:28


Impression:


 


1.  No acute abnormalities.


2.  Moderate generalized volume loss.


3.  Moderate supratentorial chronic small vessel ischemic changes.


 


Dr. Ugalde discussed these findings by telephone with Inderjit Lazaro MD on 

11/22/2017 at 1819 hours.


 











Laboratory Results: 


 Laboratory Results





 11/22/17 17:30 





 11/22/17 17:30 





 











  11/22/17 11/22/17 11/22/17





  17:30 17:30 17:30


 


WBC      3.90 10^3/uL 10^3/uL





     (3.80-9.50) 


 


RBC      3.86 10^6/uL L 10^6/uL





     (4.40-6.38) 


 


Hgb      11.2 g/dL L g/dL





     (13.7-17.5) 


 


Hct      35.2 % L %





     (40.0-51.0) 


 


MCV      91.2 fL fL





     (81.5-99.8) 


 


MCH      29.0 pg pg





     (27.9-34.1) 


 


MCHC      31.8 g/dL L g/dL





     (32.4-36.7) 


 


RDW      19.5 % H %





     (11.5-15.2) 


 


Plt Count      140 10^3/uL L 10^3/uL





     (150-400) 


 


MPV      10.2 fL fL





     (8.7-11.7) 


 


Neut % (Auto)      63.3 % %





     (39.3-74.2) 


 


Lymph % (Auto)      14.1 % L %





     (15.0-45.0) 


 


Mono % (Auto)      14.4 % H %





     (4.5-13.0) 


 


Eos % (Auto)      5.9 % %





     (0.6-7.6) 


 


Baso % (Auto)      0.8 % %





     (0.3-1.7) 


 


Nucleat RBC Rel Count      0.0 % %





     (0.0-0.2) 


 


Absolute Neuts (auto)      2.47 10^3/uL 10^3/uL





     (1.70-6.50) 


 


Absolute Lymphs (auto)      0.55 10^3/uL L 10^3/uL





     (1.00-3.00) 


 


Absolute Monos (auto)      0.56 10^3/uL 10^3/uL





     (0.30-0.80) 


 


Absolute Eos (auto)      0.23 10^3/uL 10^3/uL





     (0.03-0.40) 


 


Absolute Basos (auto)      0.03 10^3/uL 10^3/uL





     (0.02-0.10) 


 


Absolute Nucleated RBC      0.00 10^3/uL 10^3/uL





     (0-0.01) 


 


Immature Gran %      1.5 % H %





     (0.0-1.1) 


 


Immature Gran #      0.06 10^3/uL 10^3/uL





     (0.00-0.10) 


 


PT    23.1 SEC H SEC  





    (12.0-15.0)  


 


INR    2.03  H   





    (0.83-1.16)  


 


APTT    38.3 SEC H SEC  





    (23.0-38.0)  


 


Sodium  139 mEq/L mEq/L    





   (134-144)   


 


Potassium  4.2 mEq/L mEq/L    





   (3.5-5.2)   


 


Chloride  99 mEq/L mEq/L    





   ()   


 


Carbon Dioxide  32 mEq/l H mEq/l    





   (22-31)   


 


Anion Gap  8 mEq/L mEq/L    





   (8-16)   


 


BUN  15 mg/dL mg/dL    





   (7-23)   


 


Creatinine  1.0 mg/dL mg/dL    





   (0.7-1.3)   


 


Estimated GFR  > 60     





    


 


Glucose  102 mg/dL H mg/dL    





   ()   


 


Calcium  8.3 mg/dL L mg/dL    





   (8.5-10.4)   


 


Troponin I  0.099 ng/mL H ng/mL    





   (0.000-0.034)   














General


Initial Vital Signs: 


 Initial Vital Signs











Temperature (C)  37.1 C   11/22/17 17:28


 


Heart Rate  79   11/22/17 17:28


 


Respiratory Rate  18   11/22/17 17:28


 


Blood Pressure  111/73   11/22/17 17:28


 


O2 Sat (%)  98   11/22/17 17:28








 











O2 Delivery Mode               Nasal Cannula


 


O2 (L/minute)                  2














Allergies/Adverse Reactions: 


 





daptomycin Allergy (Verified 10/02/17 21:01)


 








Home Medications: 














 Medication  Instructions  Recorded


 


Tamsulosin HCl [Flomax 0.4 MG (*)] 0.4 mg PO DAILY 03/13/12


 


Levothyroxine [Synthroid 150 mcg 150 mcg PO DAILY06 12/30/13





(*)]  


 


Nitroglycerin [Nitrostat 0.4 mg 0.4 mg SL Q5M PRN 01/24/17





(*)]  


 


Enzalutamide [Xtandi] 80 mg PO DAILY 10/26/17


 


Potassium Chloride Po [Potassium 20 meq PO BID  udcup 10/31/17





Chloride 20 mg/15 ml (*)]  


 


Ascorbic Acid [Vitamin C 500 mg 1,000 mg PO DAILY 11/03/17





(*)]  


 


Cholecalciferol Vit D3 [Vitamin D3 1,000 units PO DAILY 11/03/17





(*)]  


 


Furosemide [Lasix 40 MG (*)] 40 mg PO DAILY 11/03/17


 


Herbals/Supplements -Info Only 1 ea PO DAILY 11/03/17


 


Rosuvastatin Calcium [Crestor] 10 mg PO DAILY 11/03/17


 


Digoxin [Lanoxin 250 mcg (RX)] 125 mcg PO DAILY10  tab 11/15/17


 


Pantoprazole Sodium [Protonix 40mg 40 mg PO DAILY 11/22/17





(*)]  


 


Warfarin Sodium [Coumadin] 10 mg PO DAILY@16 11/22/17














Departure





- Departure


Disposition: [facility] Acute


Clinical Impression: 


 Subtherapeutic international normalized ratio (INR)





Altered mental status


Qualifiers:


 Altered mental status type: unspecified Qualified Code(s): R41.82 - Altered 

mental status, unspecified





Condition: Fair


Report Scribed for: Inderjit Lazaro


Report Scribed by: Rebeca Chow


Date of Report: 11/22/17


Time of Report: 17:09


Physician Review and Approval Statement: Portions of this note were transcribed 

by an ED scribe.  I personally performed the history, physical exam, and 

medical decision making; and confirm the accuracy of the information in the 

transcribed note. No

## 2021-03-05 ENCOUNTER — APPOINTMENT (RX ONLY)
Dept: URBAN - METROPOLITAN AREA CLINIC 100 | Facility: CLINIC | Age: 86
Setting detail: DERMATOLOGY
End: 2021-03-05

## 2021-03-05 DIAGNOSIS — D22 MELANOCYTIC NEVI: ICD-10-CM | Status: STABLE

## 2021-03-05 DIAGNOSIS — L82.1 OTHER SEBORRHEIC KERATOSIS: ICD-10-CM | Status: STABLE

## 2021-03-05 DIAGNOSIS — D18.0 HEMANGIOMA: ICD-10-CM | Status: STABLE

## 2021-03-05 DIAGNOSIS — L81.8 OTHER SPECIFIED DISORDERS OF PIGMENTATION: ICD-10-CM | Status: STABLE

## 2021-03-05 DIAGNOSIS — D69.2 OTHER NONTHROMBOCYTOPENIC PURPURA: ICD-10-CM | Status: STABLE

## 2021-03-05 DIAGNOSIS — D17 BENIGN LIPOMATOUS NEOPLASM: ICD-10-CM | Status: STABLE

## 2021-03-05 DIAGNOSIS — L57.0 ACTINIC KERATOSIS: ICD-10-CM | Status: INADEQUATELY CONTROLLED

## 2021-03-05 DIAGNOSIS — Z85.820 PERSONAL HISTORY OF MALIGNANT MELANOMA OF SKIN: ICD-10-CM | Status: STABLE

## 2021-03-05 PROBLEM — D22.5 MELANOCYTIC NEVI OF TRUNK: Status: ACTIVE | Noted: 2021-03-05

## 2021-03-05 PROBLEM — D18.01 HEMANGIOMA OF SKIN AND SUBCUTANEOUS TISSUE: Status: ACTIVE | Noted: 2021-03-05

## 2021-03-05 PROBLEM — D48.5 NEOPLASM OF UNCERTAIN BEHAVIOR OF SKIN: Status: ACTIVE | Noted: 2021-03-05

## 2021-03-05 PROBLEM — D17.21 BENIGN LIPOMATOUS NEOPLASM OF SKIN AND SUBCUTANEOUS TISSUE OF RIGHT ARM: Status: ACTIVE | Noted: 2021-03-05

## 2021-03-05 PROCEDURE — ? COUNSELING

## 2021-03-05 PROCEDURE — 11102 TANGNTL BX SKIN SINGLE LES: CPT

## 2021-03-05 PROCEDURE — ? BIOPSY BY SHAVE METHOD

## 2021-03-05 PROCEDURE — 17003 DESTRUCT PREMALG LES 2-14: CPT

## 2021-03-05 PROCEDURE — ? LIQUID NITROGEN

## 2021-03-05 PROCEDURE — 99213 OFFICE O/P EST LOW 20 MIN: CPT | Mod: 25

## 2021-03-05 PROCEDURE — ? SUNSCREEN RECOMMENDATIONS

## 2021-03-05 PROCEDURE — ? FULL BODY SKIN EXAM

## 2021-03-05 PROCEDURE — 17000 DESTRUCT PREMALG LESION: CPT | Mod: 59

## 2021-03-05 ASSESSMENT — LOCATION DETAILED DESCRIPTION DERM
LOCATION DETAILED: RIGHT INFERIOR MEDIAL FOREHEAD
LOCATION DETAILED: LEFT MEDIAL INFERIOR CHEST
LOCATION DETAILED: LEFT DISTAL DORSAL FOREARM
LOCATION DETAILED: LEFT CENTRAL FRONTAL SCALP
LOCATION DETAILED: LEFT PROXIMAL POSTERIOR UPPER ARM
LOCATION DETAILED: RIGHT DORSAL WRIST
LOCATION DETAILED: RIGHT MEDIAL INFERIOR CHEST
LOCATION DETAILED: LEFT SUPERIOR MEDIAL MIDBACK
LOCATION DETAILED: RIGHT INFERIOR UPPER BACK
LOCATION DETAILED: LEFT SUPERIOR UPPER BACK
LOCATION DETAILED: RIGHT INFERIOR MEDIAL MIDBACK
LOCATION DETAILED: LEFT MEDIAL SUPERIOR CHEST
LOCATION DETAILED: RIGHT PROXIMAL DORSAL FOREARM
LOCATION DETAILED: RIGHT MID-UPPER BACK
LOCATION DETAILED: RIGHT CENTRAL TEMPLE
LOCATION DETAILED: RIGHT SUPERIOR LATERAL UPPER BACK
LOCATION DETAILED: LEFT SUPERIOR LATERAL FOREHEAD

## 2021-03-05 ASSESSMENT — LOCATION SIMPLE DESCRIPTION DERM
LOCATION SIMPLE: RIGHT LOWER BACK
LOCATION SIMPLE: RIGHT FOREARM
LOCATION SIMPLE: LEFT LOWER BACK
LOCATION SIMPLE: LEFT UPPER BACK
LOCATION SIMPLE: LEFT UPPER ARM
LOCATION SIMPLE: RIGHT TEMPLE
LOCATION SIMPLE: CHEST
LOCATION SIMPLE: RIGHT FOREHEAD
LOCATION SIMPLE: RIGHT WRIST
LOCATION SIMPLE: RIGHT UPPER BACK
LOCATION SIMPLE: LEFT FOREARM
LOCATION SIMPLE: LEFT SCALP
LOCATION SIMPLE: LEFT FOREHEAD

## 2021-03-05 ASSESSMENT — LOCATION ZONE DERM
LOCATION ZONE: FACE
LOCATION ZONE: ARM
LOCATION ZONE: SCALP
LOCATION ZONE: TRUNK

## 2021-03-05 NOTE — PROCEDURE: LIQUID NITROGEN
Detail Level: Detailed
Consent: The patient's consent was obtained including but not limited to risks of crusting, scabbing, blistering, scarring, darker or lighter pigmentary change, recurrence, incomplete removal and infection.
Duration Of Freeze Thaw-Cycle (Seconds): 5
Render Post-Care Instructions In Note?: no
Post-Care Instructions: I reviewed with the patient in detail post-care instructions. Patient is to wear sunprotection, and avoid picking at any of the treated lesions. Pt may apply Vaseline to crusted or scabbing areas.

## 2021-12-21 NOTE — GDS
IN-HOSPITAL CONSULTANTS:  Jim Solomon MD, general surgery.



DISCHARGE DIAGNOSIS:  Appendicitis with microperforation.



HISTORY OF PRESENT ILLNESS:  The patient is a pleasant 84-year-old gentleman with a past medical hist
ory of coronary artery disease with history of CABG, as well as transcatheter aortic valve replacemen
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
h his wife and presented to the [facility] Emergency Room on 11/12/2018, with complai
nts of abdominal pain.  He had a CT scan of his abdomen, which showed evidence of an appendicitis wit
h microperforation.  There was no abscess or pneumoperitoneum appreciated.  General Surgery was consu
lted with the hope of a nonoperative course considering his multiple medical issues.  Fortunately, he
 did rather well with antibiotic therapy, and he remained on antibiotic therapy throughout his hospit
alization.  It was felt that he could stop antibiotic therapy at the time of discharge.  Surgery leonid
red him for discharge today, and he will return home with no changes in his medical regimen as compar
ed to when he was admitted.  He does have home health and this will be resumed as well at the time of
 discharge.



HOSPITAL COURSE BY PROBLEM:  Sepsis: Patient did fit criteria for sepsis on admission.  The source wa
s acute appendicitis.  His parameters improved with antibiotic therapy 



Acute appendicitis: This was treated conservatively with IV antibiotics and, fortunately, patient did
 rather well, not needing any surgical intervention.  He has completed his course of antibiotic thera
py here in the hospital. 



Coronary artery disease:  Patient with history of CABG.  He was continued on his medical therapy incl
uding statin therapy as well as Coreg and digoxin.  



Aortic valve replacement:  History of.  Patient had a transcatheter aortic valve replacement.  He is 
followed regularly by Cardiology.  



Chronic hypoxic respiratory failure:  The patient's baseline is 2 L continuous.  [address]. 



History of recurrent DVT/PE:  The patient has a history of Factor V Leiden mutation.  He will continu
e on Coumadin.  His INR was slightly subtherapeutic at 1.89 on the day of discharge. 



Hypothyroidism:  He was continued on levothyroxine.



DISPOSITION-PATIENT:  Patient appears stable for discharge home today.



EXAM ON DAY OF DISCHARGE:  VITAL SIGNS:  Temperature 36.8, blood pressure 99/54, heart rate 72, respi
rations 23, sating 97% on 2 L nasal cannula. 

GENERAL:  Patient appeared comfortable.  He is sitting in a chair at the bedside.  Awake, alert, conv
ersant in no acute distress.  HEART:  Regular rate and rhythm.  No murmurs noted. 

LUNGS:  Clear on auscultation with normal respiratory effort. 

ABDOMEN:  Soft, nontender, nondistended, no tenderness, particularly in the right lower quadrant.  

:  No Zafar catheter in place.  

EXTREMITIES:  No significant pitting edema.



NOTABLE STUDIES:  

1.  White blood cell count at admission was 16 and normalized the day after starting antibiotic thera
py.  INR 1.89 on the day of discharge.

2.  CT abdomen showed evidence of acute appendicitis with microperforation.  No abscess or pneumoperi
toneum.  Extensive diverticulosis without any evidence of diverticulitis.  Cholelithiasis.  Chronic s
mall bilateral pleural effusions.  Sclerotic bone metastasis unchanged.  No new bone lesion.  The pat
ient with a history of prostate adenocarcinoma.



DISCHARGE MEDICATIONS:  

1.  Digoxin 125 mcg daily.

2.  Coreg 3.125 mg twice a day.

3.  Coumadin with no changes made during this hospitalization.

4.  Flomax 0.4 mg daily.

5.  Levothyroxine 150 mcg daily.

6.  Xtandi 80 mg daily.

7.  Vitamin D supplementation.

8.  Crestor 10 mg daily.

9.  Pantoprazole 40 mg daily.

10.  Ferrous sulfate 325 mg daily.

11.  Lasix 40 mg twice a day.

12.  Temazepam 7.5 mg nightly as needed for insomnia.



DISCHARGE INSTRUCTIONS:  A followup visit with Dr. Jim Solomon with general surgery is recommended in
 1 week as well as with Dr. Kenneth Prado.



TIME SPENT:  35 minutes of time dedicated to discharge efforts today.





Job #:  [id number]
21-Dec-2021 11:18

## 2022-02-11 ENCOUNTER — APPOINTMENT (RX ONLY)
Dept: URBAN - METROPOLITAN AREA CLINIC 100 | Facility: CLINIC | Age: 87
Setting detail: DERMATOLOGY
End: 2022-02-11

## 2022-02-11 DIAGNOSIS — D22 MELANOCYTIC NEVI: ICD-10-CM

## 2022-02-11 DIAGNOSIS — L82.1 OTHER SEBORRHEIC KERATOSIS: ICD-10-CM

## 2022-02-11 DIAGNOSIS — D18.0 HEMANGIOMA: ICD-10-CM

## 2022-02-11 DIAGNOSIS — L81.8 OTHER SPECIFIED DISORDERS OF PIGMENTATION: ICD-10-CM

## 2022-02-11 DIAGNOSIS — R60.0 LOCALIZED EDEMA: ICD-10-CM

## 2022-02-11 DIAGNOSIS — L84 CORNS AND CALLOSITIES: ICD-10-CM

## 2022-02-11 DIAGNOSIS — L57.0 ACTINIC KERATOSIS: ICD-10-CM

## 2022-02-11 DIAGNOSIS — Z85.820 PERSONAL HISTORY OF MALIGNANT MELANOMA OF SKIN: ICD-10-CM

## 2022-02-11 DIAGNOSIS — D17 BENIGN LIPOMATOUS NEOPLASM: ICD-10-CM

## 2022-02-11 DIAGNOSIS — L82.0 INFLAMED SEBORRHEIC KERATOSIS: ICD-10-CM

## 2022-02-11 DIAGNOSIS — D69.2 OTHER NONTHROMBOCYTOPENIC PURPURA: ICD-10-CM

## 2022-02-11 PROBLEM — D18.01 HEMANGIOMA OF SKIN AND SUBCUTANEOUS TISSUE: Status: ACTIVE | Noted: 2022-02-11

## 2022-02-11 PROBLEM — D22.5 MELANOCYTIC NEVI OF TRUNK: Status: ACTIVE | Noted: 2022-02-11

## 2022-02-11 PROBLEM — D17.21 BENIGN LIPOMATOUS NEOPLASM OF SKIN AND SUBCUTANEOUS TISSUE OF RIGHT ARM: Status: ACTIVE | Noted: 2022-02-11

## 2022-02-11 PROCEDURE — 17003 DESTRUCT PREMALG LES 2-14: CPT | Mod: 59

## 2022-02-11 PROCEDURE — ? LIQUID NITROGEN

## 2022-02-11 PROCEDURE — ? FULL BODY SKIN EXAM

## 2022-02-11 PROCEDURE — 99213 OFFICE O/P EST LOW 20 MIN: CPT | Mod: 25

## 2022-02-11 PROCEDURE — 17000 DESTRUCT PREMALG LESION: CPT | Mod: 59

## 2022-02-11 PROCEDURE — ? SUNSCREEN RECOMMENDATIONS

## 2022-02-11 PROCEDURE — ? COUNSELING

## 2022-02-11 PROCEDURE — 17110 DESTRUCTION B9 LES UP TO 14: CPT

## 2022-02-11 PROCEDURE — ? RECOMMENDATIONS

## 2022-02-11 ASSESSMENT — LOCATION DETAILED DESCRIPTION DERM
LOCATION DETAILED: LEFT PROXIMAL POSTERIOR UPPER ARM
LOCATION DETAILED: RIGHT CENTRAL LATERAL NECK
LOCATION DETAILED: RIGHT SUPERIOR LATERAL UPPER BACK
LOCATION DETAILED: RIGHT PROXIMAL DORSAL FOREARM
LOCATION DETAILED: RIGHT SUPERIOR FOREHEAD
LOCATION DETAILED: RIGHT MEDIAL INFERIOR CHEST
LOCATION DETAILED: RIGHT INFERIOR MEDIAL MIDBACK
LOCATION DETAILED: LEFT SUPERIOR UPPER BACK
LOCATION DETAILED: LEFT DISTAL PRETIBIAL REGION
LOCATION DETAILED: LEFT LATERAL PLANTAR 5TH TOE
LOCATION DETAILED: LEFT MEDIAL INFERIOR CHEST
LOCATION DETAILED: MID-FRONTAL SCALP
LOCATION DETAILED: LEFT MEDIAL SUPERIOR CHEST
LOCATION DETAILED: RIGHT INFERIOR MEDIAL FOREHEAD
LOCATION DETAILED: LEFT SUPERIOR MEDIAL MIDBACK
LOCATION DETAILED: RIGHT INFERIOR UPPER BACK
LOCATION DETAILED: RIGHT ANTERIOR PROXIMAL THIGH
LOCATION DETAILED: LEFT ANTECUBITAL SKIN
LOCATION DETAILED: RIGHT MID-UPPER BACK
LOCATION DETAILED: RIGHT DISTAL PRETIBIAL REGION
LOCATION DETAILED: RIGHT SUPERIOR ANTERIOR NECK
LOCATION DETAILED: LEFT SUPERIOR PARIETAL SCALP

## 2022-02-11 ASSESSMENT — LOCATION SIMPLE DESCRIPTION DERM
LOCATION SIMPLE: RIGHT LOWER BACK
LOCATION SIMPLE: RIGHT THIGH
LOCATION SIMPLE: RIGHT PRETIBIAL REGION
LOCATION SIMPLE: RIGHT ANTERIOR NECK
LOCATION SIMPLE: SCALP
LOCATION SIMPLE: LEFT UPPER ARM
LOCATION SIMPLE: PLANTAR SURFACE OF LEFT 5TH TOE
LOCATION SIMPLE: LEFT LOWER BACK
LOCATION SIMPLE: RIGHT FOREARM
LOCATION SIMPLE: LEFT UPPER BACK
LOCATION SIMPLE: ANTERIOR SCALP
LOCATION SIMPLE: RIGHT UPPER BACK
LOCATION SIMPLE: LEFT ELBOW
LOCATION SIMPLE: RIGHT FOREHEAD
LOCATION SIMPLE: LEFT PRETIBIAL REGION
LOCATION SIMPLE: CHEST
LOCATION SIMPLE: NECK

## 2022-02-11 ASSESSMENT — LOCATION ZONE DERM
LOCATION ZONE: NECK
LOCATION ZONE: TOE
LOCATION ZONE: ARM
LOCATION ZONE: TRUNK
LOCATION ZONE: FACE
LOCATION ZONE: SCALP
LOCATION ZONE: LEG

## 2022-02-11 NOTE — PROCEDURE: RECOMMENDATIONS
Recommendations (Free Text): Refer to Dr. Cintron
Render Risk Assessment In Note?: no
Detail Level: Detailed

## 2022-02-11 NOTE — PROCEDURE: LIQUID NITROGEN
Post-Care Instructions: I reviewed with the patient in detail post-care instructions. Patient is to wear sunprotection, and avoid picking at any of the treated lesions. Pt may apply Vaseline to crusted or scabbing areas.
Consent: The patient's consent was obtained including but not limited to risks of crusting, scabbing, blistering, scarring, darker or lighter pigmentary change, recurrence, incomplete removal and infection.
Duration Of Freeze Thaw-Cycle (Seconds): 5
Show Applicator Variable?: Yes
Render Note In Bullet Format When Appropriate: No
Detail Level: Detailed
Medical Necessity Information: It is in your best interest to select a reason for this procedure from the list below. All of these items fulfill various CMS LCD requirements except the new and changing color options.
Medical Necessity Clause: This procedure was medically necessary because the lesions that were treated were:
Spray Paint Text: The liquid nitrogen was applied to the skin utilizing a spray paint frosting technique.